# Patient Record
Sex: MALE | Race: WHITE | NOT HISPANIC OR LATINO | Employment: OTHER | URBAN - METROPOLITAN AREA
[De-identification: names, ages, dates, MRNs, and addresses within clinical notes are randomized per-mention and may not be internally consistent; named-entity substitution may affect disease eponyms.]

---

## 2018-05-04 ENCOUNTER — OFFICE VISIT (OUTPATIENT)
Dept: CARDIOLOGY CLINIC | Facility: CLINIC | Age: 68
End: 2018-05-04
Payer: MEDICARE

## 2018-05-04 VITALS
WEIGHT: 208 LBS | DIASTOLIC BLOOD PRESSURE: 86 MMHG | HEIGHT: 71 IN | BODY MASS INDEX: 29.12 KG/M2 | HEART RATE: 95 BPM | SYSTOLIC BLOOD PRESSURE: 128 MMHG | RESPIRATION RATE: 16 BRPM

## 2018-05-04 DIAGNOSIS — Z01.810 PREOP CARDIOVASCULAR EXAM: Primary | ICD-10-CM

## 2018-05-04 DIAGNOSIS — I48.19 PERSISTENT ATRIAL FIBRILLATION (HCC): ICD-10-CM

## 2018-05-04 PROBLEM — I48.91 ATRIAL FIBRILLATION (HCC): Status: ACTIVE | Noted: 2018-05-04

## 2018-05-04 PROCEDURE — 93000 ELECTROCARDIOGRAM COMPLETE: CPT | Performed by: INTERNAL MEDICINE

## 2018-05-04 PROCEDURE — 99204 OFFICE O/P NEW MOD 45 MIN: CPT | Performed by: INTERNAL MEDICINE

## 2018-05-04 RX ORDER — ACETAMINOPHEN 325 MG/1
650 TABLET ORAL EVERY 6 HOURS PRN
COMMUNITY

## 2018-05-04 RX ORDER — DILTIAZEM HYDROCHLORIDE 120 MG/1
120 CAPSULE, COATED, EXTENDED RELEASE ORAL DAILY
Qty: 30 CAPSULE | Refills: 5 | Status: SHIPPED | OUTPATIENT
Start: 2018-05-04 | End: 2018-06-08

## 2018-05-04 RX ORDER — LATANOPROST 50 UG/ML
1 SOLUTION/ DROPS OPHTHALMIC
COMMUNITY
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
## 2018-05-05 LAB
ALBUMIN SERPL BCP-MCNC: 4.1 G/DL (ref 3.5–5.7)
ALP SERPL-CCNC: 51 IU/L (ref 55–165)
ALT SERPL W P-5'-P-CCNC: 38 IU/L (ref 10–35)
ANION GAP SERPL CALCULATED.3IONS-SCNC: 11.7 MM/L
APTT PPP: 35.9 SEC (ref 24.4–37.6)
AST SERPL W P-5'-P-CCNC: 28 U/L (ref 8–27)
BASOPHILS # BLD AUTO: 0 X3/UL (ref 0–0.3)
BASOPHILS # BLD AUTO: 0.8 % (ref 0–2)
BILIRUB SERPL-MCNC: 1.1 MG/DL (ref 0.3–1)
BUN SERPL-MCNC: 13 MG/DL (ref 7–25)
CALCIUM SERPL-MCNC: 8.9 MG/DL (ref 8.6–10.5)
CHLORIDE SERPL-SCNC: 105 MM/L (ref 98–107)
CHOLEST SERPL-MCNC: 147 MG/DL (ref 0–200)
CO2 SERPL-SCNC: 28 MM/L (ref 21–31)
CREAT SERPL-MCNC: 0.99 MG/DL (ref 0.7–1.3)
DEPRECATED RDW RBC AUTO: 12.8 %
EGFR (HISTORICAL): > 60 GFR
EGFR AFRICAN AMERICAN (HISTORICAL): > 60 GFR
EOSINOPHIL # BLD AUTO: 0.2 X3/UL (ref 0–0.5)
EOSINOPHIL NFR BLD AUTO: 3.6 % (ref 0–5)
GLUCOSE (HISTORICAL): 104 MG/DL (ref 65–99)
HCT VFR BLD AUTO: 45.1 % (ref 42–52)
HDLC SERPL-MCNC: 39 MG/DL (ref 40–60)
HGB BLD-MCNC: 15.4 G/DL (ref 14–18)
INR PPP: 1.03 (ref 0.9–1.5)
LDLC SERPL CALC-MCNC: 96.6 MG/DL (ref 75–193)
LYMPHOCYTES # BLD AUTO: 1.4 X3/UL (ref 1.2–4.2)
LYMPHOCYTES NFR BLD AUTO: 25.4 % (ref 20.5–51.1)
MCH RBC QN AUTO: 32 PG (ref 26–34)
MCHC RBC AUTO-ENTMCNC: 34.2 G/DL (ref 31–37)
MCV RBC AUTO: 93.6 FL (ref 81–99)
MONOCYTES # BLD AUTO: 0.5 X3/UL (ref 0–1)
MONOCYTES NFR BLD AUTO: 9.1 % (ref 1.7–12)
NEUTROPHILS # BLD AUTO: 3.3 X3/UL (ref 1.4–6.5)
NEUTS SEG NFR BLD AUTO: 61.1 % (ref 42.2–75.2)
OSMOLALITY, SERUM (HISTORICAL): 280 MOSM (ref 262–291)
OTHER CELLS (HISTORICAL): NORMAL
PLATELET # BLD AUTO: 226 X3/UL (ref 130–400)
PLATELET ESTIMATE (HISTORICAL): NORMAL
PMV BLD AUTO: 9.3 FL
POTASSIUM SERPL-SCNC: 4.7 MM/L (ref 3.5–5.5)
PROTHROMBIN TIME (HISTORICAL): 11.9 SEC (ref 10.1–12.9)
RBC # BLD AUTO: 4.82 X6/UL (ref 4.3–5.9)
RBC MORPHOLOGY (HISTORICAL): NORMAL
SODIUM SERPL-SCNC: 140 MM/L (ref 134–143)
TOTAL PROTEIN (HISTORICAL): 6.5 G/DL (ref 6.4–8.9)
TRIGL SERPL-MCNC: 57 MG/DL (ref 44–166)
VLDL CHOLESTEROL (HISTORICAL): 11 MG/DL (ref 5–51)
WBC # BLD AUTO: 5.4 X3/UL (ref 4.8–10.8)

## 2018-05-07 ENCOUNTER — TELEPHONE (OUTPATIENT)
Dept: CARDIOLOGY CLINIC | Facility: CLINIC | Age: 68
End: 2018-05-07

## 2018-05-07 NOTE — TELEPHONE ENCOUNTER
Per dr Kenn Summers on 5/4/18  He had given eliquis to patient at [facility] 5/4/18 for possible start on 5/7/18  This was to be based on cbc results done on 5/7/18  However, per dr Kenn Summers, he instructed to call patient on Monday 5/7, tell patient he recommends to hold off starting eliquis until he is reevaluated at next ov with zuri in 3 weeks,   Phone call to patient, reviewed dr keating's instructions  Patient understood not to start eliquis 5 mg tablets  He has 5 boxes of eliquis 5 mg tablets in the home  Patient reports he was told cataract surgery for today was cancelled

## 2018-05-23 ENCOUNTER — DOCUMENTATION (OUTPATIENT)
Dept: CARDIOLOGY CLINIC | Facility: CLINIC | Age: 68
End: 2018-05-23

## 2018-05-31 ENCOUNTER — HOSPITAL ENCOUNTER (OUTPATIENT)
Dept: NON INVASIVE DIAGNOSTICS | Facility: CLINIC | Age: 68
Discharge: HOME | End: 2018-05-31
Payer: MEDICARE

## 2018-05-31 DIAGNOSIS — Z01.810 PREOP CARDIOVASCULAR EXAM: ICD-10-CM

## 2018-05-31 PROCEDURE — 93306 TTE W/DOPPLER COMPLETE: CPT

## 2018-05-31 PROCEDURE — 93306 TTE W/DOPPLER COMPLETE: CPT | Performed by: INTERNAL MEDICINE

## 2018-06-08 ENCOUNTER — OFFICE VISIT (OUTPATIENT)
Dept: CARDIOLOGY CLINIC | Facility: CLINIC | Age: 68
End: 2018-06-08
Payer: MEDICARE

## 2018-06-08 VITALS
SYSTOLIC BLOOD PRESSURE: 148 MMHG | DIASTOLIC BLOOD PRESSURE: 82 MMHG | HEART RATE: 89 BPM | WEIGHT: 210 LBS | BODY MASS INDEX: 29.4 KG/M2 | HEIGHT: 71 IN

## 2018-06-08 DIAGNOSIS — I48.0 PAROXYSMAL ATRIAL FIBRILLATION (HCC): ICD-10-CM

## 2018-06-08 DIAGNOSIS — I48.91 ATRIAL FIBRILLATION, UNSPECIFIED TYPE (HCC): Primary | ICD-10-CM

## 2018-06-08 PROCEDURE — 99214 OFFICE O/P EST MOD 30 MIN: CPT | Performed by: INTERNAL MEDICINE

## 2018-06-08 PROCEDURE — 93000 ELECTROCARDIOGRAM COMPLETE: CPT | Performed by: INTERNAL MEDICINE

## 2018-06-08 RX ORDER — METOPROLOL SUCCINATE 25 MG/1
25 TABLET, EXTENDED RELEASE ORAL DAILY
Qty: 60 TABLET | Refills: 5 | Status: SHIPPED | OUTPATIENT
Start: 2018-06-08
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
## 2018-06-12 RX ORDER — SODIUM CHLORIDE 9 MG/ML
100 INJECTION, SOLUTION INTRAVENOUS ONCE
Status: CANCELLED | OUTPATIENT
Start: 2018-06-15

## 2018-06-13 ENCOUNTER — HOSPITAL ENCOUNTER (OUTPATIENT)
Dept: NON INVASIVE DIAGNOSTICS | Facility: CLINIC | Age: 68
Discharge: HOME | End: 2018-06-13
Payer: MEDICARE

## 2018-06-13 DIAGNOSIS — I48.91 ATRIAL FIBRILLATION, UNSPECIFIED TYPE (HCC): ICD-10-CM

## 2018-06-13 LAB
MAX DIASTOLIC BP: 90 MMHG
MAX HEART RATE: 134 BPM
MAX PREDICTED HEART RATE: 151 BPM
MAX. SYSTOLIC BP: 200 MMHG
PROTOCOL NAME: NORMAL
REASON FOR TERMINATION: NORMAL
TARGET HR FORMULA: NORMAL
TEST INDICATION: NORMAL
TIME IN EXERCISE PHASE: NORMAL

## 2018-06-13 PROCEDURE — 93017 CV STRESS TEST TRACING ONLY: CPT

## 2018-06-13 PROCEDURE — A9502 TC99M TETROFOSMIN: HCPCS

## 2018-06-13 PROCEDURE — 78452 HT MUSCLE IMAGE SPECT MULT: CPT

## 2018-06-13 RX ADMIN — REGADENOSON 0.4 MG: 0.08 INJECTION, SOLUTION INTRAVENOUS at 10:15

## 2018-06-15 ENCOUNTER — ANESTHESIA (OUTPATIENT)
Dept: NON INVASIVE DIAGNOSTICS | Facility: HOSPITAL | Age: 68
End: 2018-06-15

## 2018-06-15 ENCOUNTER — ANESTHESIA EVENT (OUTPATIENT)
Dept: NON INVASIVE DIAGNOSTICS | Facility: HOSPITAL | Age: 68
End: 2018-06-15

## 2018-06-15 ENCOUNTER — HOSPITAL ENCOUNTER (OUTPATIENT)
Dept: NON INVASIVE DIAGNOSTICS | Facility: HOSPITAL | Age: 68
Discharge: HOME | End: 2018-06-15
Attending: INTERNAL MEDICINE
Payer: MEDICARE

## 2018-06-15 VITALS
DIASTOLIC BLOOD PRESSURE: 86 MMHG | OXYGEN SATURATION: 97 % | RESPIRATION RATE: 18 BRPM | SYSTOLIC BLOOD PRESSURE: 166 MMHG | HEART RATE: 67 BPM

## 2018-06-15 VITALS
DIASTOLIC BLOOD PRESSURE: 77 MMHG | RESPIRATION RATE: 18 BRPM | TEMPERATURE: 97.8 F | HEIGHT: 71 IN | HEART RATE: 68 BPM | SYSTOLIC BLOOD PRESSURE: 145 MMHG | BODY MASS INDEX: 29.12 KG/M2 | OXYGEN SATURATION: 99 % | WEIGHT: 208 LBS

## 2018-06-15 DIAGNOSIS — I48.0 PAROXYSMAL ATRIAL FIBRILLATION (HCC): ICD-10-CM

## 2018-06-15 DIAGNOSIS — I48.91 ATRIAL FIBRILLATION, UNSPECIFIED TYPE (HCC): ICD-10-CM

## 2018-06-15 DIAGNOSIS — I48.0 PAF (PAROXYSMAL ATRIAL FIBRILLATION) (HCC): Primary | ICD-10-CM

## 2018-06-15 DIAGNOSIS — I48.0 PAROXYSMAL ATRIAL FIBRILLATION (HCC): Primary | ICD-10-CM

## 2018-06-15 LAB
ANION GAP SERPL CALCULATED.3IONS-SCNC: 6 MMOL/L (ref 4–13)
ATRIAL RATE: 67 BPM
BUN SERPL-MCNC: 13 MG/DL (ref 5–25)
CALCIUM SERPL-MCNC: 8.8 MG/DL (ref 8.3–10.1)
CHLORIDE SERPL-SCNC: 105 MMOL/L (ref 100–108)
CO2 SERPL-SCNC: 30 MMOL/L (ref 21–32)
CREAT SERPL-MCNC: 0.91 MG/DL (ref 0.6–1.3)
ERYTHROCYTE [DISTWIDTH] IN BLOOD BY AUTOMATED COUNT: 12.7 % (ref 11.6–15.1)
GFR SERPL CREATININE-BSD FRML MDRD: 86 ML/MIN/1.73SQ M
GLUCOSE P FAST SERPL-MCNC: 95 MG/DL (ref 65–99)
GLUCOSE SERPL-MCNC: 95 MG/DL (ref 65–140)
HCT VFR BLD AUTO: 43.2 % (ref 36.5–49.3)
HGB BLD-MCNC: 14.6 G/DL (ref 12–17)
MCH RBC QN AUTO: 31.5 PG (ref 26.8–34.3)
MCHC RBC AUTO-ENTMCNC: 33.8 G/DL (ref 31.4–37.4)
MCV RBC AUTO: 93 FL (ref 82–98)
P AXIS: 27 DEGREES
PLATELET # BLD AUTO: 214 THOUSANDS/UL (ref 149–390)
PMV BLD AUTO: 10 FL (ref 8.9–12.7)
POTASSIUM SERPL-SCNC: 4.3 MMOL/L (ref 3.5–5.3)
PR INTERVAL: 202 MS
QRS AXIS: 62 DEGREES
QRS AXIS: 68 DEGREES
QRSD INTERVAL: 80 MS
QRSD INTERVAL: 88 MS
QT INTERVAL: 364 MS
QT INTERVAL: 448 MS
QTC INTERVAL: 430 MS
QTC INTERVAL: 473 MS
RBC # BLD AUTO: 4.63 MILLION/UL (ref 3.88–5.62)
SODIUM SERPL-SCNC: 141 MMOL/L (ref 136–145)
T WAVE AXIS: 42 DEGREES
T WAVE AXIS: 53 DEGREES
VENTRICULAR RATE: 67 BPM
VENTRICULAR RATE: 84 BPM
WBC # BLD AUTO: 5.33 THOUSAND/UL (ref 4.31–10.16)

## 2018-06-15 PROCEDURE — 93312 ECHO TRANSESOPHAGEAL: CPT

## 2018-06-15 PROCEDURE — 80048 BASIC METABOLIC PNL TOTAL CA: CPT | Performed by: PHYSICIAN ASSISTANT

## 2018-06-15 PROCEDURE — 92960 CARDIOVERSION ELECTRIC EXT: CPT

## 2018-06-15 PROCEDURE — 93005 ELECTROCARDIOGRAM TRACING: CPT

## 2018-06-15 PROCEDURE — 93010 ELECTROCARDIOGRAM REPORT: CPT | Performed by: INTERNAL MEDICINE

## 2018-06-15 PROCEDURE — 92960 CARDIOVERSION ELECTRIC EXT: CPT | Performed by: INTERNAL MEDICINE

## 2018-06-15 PROCEDURE — 85027 COMPLETE CBC AUTOMATED: CPT | Performed by: PHYSICIAN ASSISTANT

## 2018-06-15 RX ORDER — SODIUM CHLORIDE 9 MG/ML
100 INJECTION, SOLUTION INTRAVENOUS ONCE
Status: COMPLETED | OUTPATIENT
Start: 2018-06-15 | End: 2018-06-15

## 2018-06-15 RX ORDER — FLECAINIDE ACETATE 50 MG/1
50 TABLET ORAL 2 TIMES DAILY
Refills: 0
Start: 2018-06-15

## 2018-06-15 RX ORDER — LABETALOL HYDROCHLORIDE 5 MG/ML
INJECTION, SOLUTION INTRAVENOUS AS NEEDED
Status: DISCONTINUED | OUTPATIENT
Start: 2018-06-15 | End: 2018-06-15 | Stop reason: SURG

## 2018-06-15 RX ORDER — SODIUM CHLORIDE 9 MG/ML
INJECTION, SOLUTION INTRAVENOUS CONTINUOUS PRN
Status: DISCONTINUED | OUTPATIENT
Start: 2018-06-15 | End: 2018-06-15 | Stop reason: SURG

## 2018-06-15 RX ORDER — FENTANYL CITRATE 50 UG/ML
25 INJECTION, SOLUTION INTRAMUSCULAR; INTRAVENOUS
Status: DISCONTINUED | OUTPATIENT
Start: 2018-06-15 | End: 2018-06-15 | Stop reason: HOSPADM

## 2018-06-15 RX ORDER — FLECAINIDE ACETATE 50 MG/1
50 TABLET ORAL 2 TIMES DAILY
Qty: 60 TABLET | Refills: 5 | Status: SHIPPED | OUTPATIENT
Start: 2018-06-15

## 2018-06-15 RX ORDER — PROPOFOL 10 MG/ML
INJECTION, EMULSION INTRAVENOUS AS NEEDED
Status: DISCONTINUED | OUTPATIENT
Start: 2018-06-15 | End: 2018-06-15 | Stop reason: SURG

## 2018-06-15 RX ORDER — ONDANSETRON 2 MG/ML
4 INJECTION INTRAMUSCULAR; INTRAVENOUS EVERY 6 HOURS PRN
Status: DISCONTINUED | OUTPATIENT
Start: 2018-06-15 | End: 2018-06-15 | Stop reason: HOSPADM

## 2018-06-15 RX ORDER — EPHEDRINE SULFATE 50 MG/ML
INJECTION, SOLUTION INTRAVENOUS AS NEEDED
Status: DISCONTINUED | OUTPATIENT
Start: 2018-06-15 | End: 2018-06-15 | Stop reason: SURG

## 2018-06-15 RX ADMIN — PROPOFOL 50 MG: 10 INJECTION, EMULSION INTRAVENOUS at 15:15

## 2018-06-15 RX ADMIN — LABETALOL HYDROCHLORIDE 10 MG: 5 INJECTION, SOLUTION INTRAVENOUS at 14:50

## 2018-06-15 RX ADMIN — PROPOFOL 50 MG: 10 INJECTION, EMULSION INTRAVENOUS at 14:58

## 2018-06-15 RX ADMIN — SODIUM CHLORIDE: 0.9 INJECTION, SOLUTION INTRAVENOUS at 14:30

## 2018-06-15 RX ADMIN — PROPOFOL 50 MG: 10 INJECTION, EMULSION INTRAVENOUS at 15:17

## 2018-06-15 RX ADMIN — PROPOFOL 50 MG: 10 INJECTION, EMULSION INTRAVENOUS at 15:01

## 2018-06-15 RX ADMIN — SODIUM CHLORIDE: 0.9 INJECTION, SOLUTION INTRAVENOUS at 15:29

## 2018-06-15 RX ADMIN — PROPOFOL 50 MG: 10 INJECTION, EMULSION INTRAVENOUS at 14:50

## 2018-06-15 RX ADMIN — PROPOFOL 50 MG: 10 INJECTION, EMULSION INTRAVENOUS at 14:54

## 2018-06-15 RX ADMIN — PROPOFOL 40 MG: 10 INJECTION, EMULSION INTRAVENOUS at 15:19

## 2018-06-15 RX ADMIN — LIDOCAINE HYDROCHLORIDE 100 MG: 20 INJECTION, SOLUTION INTRAVENOUS at 14:44

## 2018-06-15 RX ADMIN — PROPOFOL 30 MG: 10 INJECTION, EMULSION INTRAVENOUS at 15:27

## 2018-06-15 RX ADMIN — PROPOFOL 50 MG: 10 INJECTION, EMULSION INTRAVENOUS at 14:49

## 2018-06-15 RX ADMIN — PROPOFOL 40 MG: 10 INJECTION, EMULSION INTRAVENOUS at 15:23

## 2018-06-15 RX ADMIN — PROPOFOL 40 MG: 10 INJECTION, EMULSION INTRAVENOUS at 15:11

## 2018-06-15 RX ADMIN — EPHEDRINE SULFATE 10 MG: 50 INJECTION, SOLUTION INTRAMUSCULAR; INTRAVENOUS; SUBCUTANEOUS at 15:42

## 2018-06-15 RX ADMIN — PROPOFOL 30 MG: 10 INJECTION, EMULSION INTRAVENOUS at 15:33

## 2018-06-15 RX ADMIN — SODIUM CHLORIDE 100 ML/HR: 0.9 INJECTION, SOLUTION INTRAVENOUS at 14:14

## 2018-06-15 RX ADMIN — PROPOFOL 50 MG: 10 INJECTION, EMULSION INTRAVENOUS at 14:51

## 2018-06-15 RX ADMIN — PROPOFOL 40 MG: 10 INJECTION, EMULSION INTRAVENOUS at 15:07

## 2018-06-15 NOTE — ANESTHESIA POSTPROCEDURE EVALUATION
Post-Op Assessment Note      CV Status:  Stable    Mental Status:  Alert and awake    Hydration Status:  Euvolemic    PONV Controlled:  Controlled    Airway Patency:  Patent    Post Op Vitals Reviewed: Yes          Staff: Anesthesiologist           /67 (06/15/18 1620)    Temp      Pulse 66 (06/15/18 1620)   Resp 16 (06/15/18 1620)    SpO2 98 % (06/15/18 1620)

## 2018-06-15 NOTE — ANESTHESIA PREPROCEDURE EVALUATION
Review of Systems/Medical History  Patient summary reviewed  Chart reviewed  No history of anesthetic complications     Cardiovascular  EKG reviewed, Dysrhythmias , atrial fibrillation,   Comment: PERFUSION DEFECTS:  -  There were no perfusion defects      GATED SPECT:  The calculated left ventricular ejection fraction was 65 %  Left ventricular ejection fraction was within normal limits by visual estimate  There was no left ventricular regional abnormality      SUMMARY:  -  Stress results: There was resting hypertension with an appropriate blood pressure response to stress  There was no chest pain during stress  -  ECG conclusions: The stress ECG was negative for ischemia and normal   -  Perfusion imaging: There were no perfusion defects   -  Gated SPECT: The calculated left ventricular ejection fraction was 65 %  Left ventricular ejection fraction was within normal limits by visual estimate  There was no left ventricular regional abnormality      IMPRESSIONS: Normal study after pharmacologic vasodilation without reproduction of symptoms  Myocardial perfusion imaging was normal at rest and with stress   Left ventricular systolic function was normal      Prepared and signed by     Shelvy Meckel, MD  Signed 06/13/2018 11:41:49,  Pulmonary  Negative pulmonary ROS        GI/Hepatic  Negative GI/hepatic ROS          Negative  ROS        Endo/Other  Negative endo/other ROS      GYN  Negative gynecology ROS          Hematology    Coagulation disorder currently taking oral anticoagulants,    Musculoskeletal  Negative musculoskeletal ROS        Neurology  Negative neurology ROS      Psychology   Negative psychology ROS              Physical Exam    Airway    Mallampati score: II  TM Distance: >3 FB  Neck ROM: full     Dental   No notable dental hx     Cardiovascular  Rhythm: regular, Rate: normal, Cardiovascular exam normal    Pulmonary  Pulmonary exam normal Breath sounds clear to auscultation,     Other Findings        Anesthesia Plan  ASA Score- 3     Anesthesia Type- IV sedation with anesthesia with ASA Monitors  Additional Monitors:   Airway Plan:         Plan Factors- Patient instructed to abstain from smoking on day of procedure  Patient did not smoke on day of surgery  Induction- intravenous  Postoperative Plan-     Informed Consent- Anesthetic plan and risks discussed with patient

## 2018-06-15 NOTE — DISCHARGE INSTRUCTIONS
· Please remember to take all your medications as prescribed  · A prescription for a new medication called Flecainide has been sent to your pharmacy by Dr Richard Menjivar  Please pick it up and begin taking it  · You will be contacted by the staff from Dr Jessica Veliz office to arrange an appointment for an ECG   Flecainide (By mouth)   Flecainide (FLEK-a-nide)  Used to prevent or treat irregular heartbeats (arrhythmias) that are serious  Belongs to a class of drugs called antiarrhythmics  Brand Name(s): Tambocor   There may be other brand names for this medicine  When This Medicine Should Not Be Used: You should not use this medicine if you have had an allergic reaction to flecainide or if you have certain heart problems  Your doctor will determine if this medicine is right for you  How to Use This Medicine:   Tablet  · You will receive your first dose of this medicine in a hospital  Your doctor will watch you closely after you take this medicine to make sure you do not have any serious side effects  · Take your medicine as directed  Your dose may need to be changed several times to find what works best for you  · This medicine may be taken with or without food  · You may need to carry identification stating that you are taking flecainide  Ask your doctor about this  · In infants (less than 3years of age) who drink a lot of milk, your doctor may adjust the dose of this medicine when it is time to reduce the amount of milk your infant is receiving, or if they develop gastroenteritis  Talk with your doctor if you have questions about this  If a dose is missed:   · If you take your medicine two times a day and you miss a dose, take it as soon as possible unless you are more than 6 hours late  · If you are more than 6 hours late, skip the missed dose, and take your medicine at your usual time  · You should not use two doses at the same time    How to Store and Dispose of This Medicine:   · Store the medicine in a closed container at room temperature, away from heat, moisture, and direct light  · Ask your pharmacist, doctor, or health caregiver about the best way to dispose of any outdated medicine or medicine no longer needed  · Keep all medicine out of the reach of children  Never share your medicine with anyone  Drugs and Foods to Avoid:   Ask your doctor or pharmacist before using any other medicine, including over-the-counter medicines, vitamins, and herbal products  · Make sure your doctor knows if you are using other medicine for heart rhythm problems (such as amiodarone, disopyramide, quinidine, Cardioquin®, Cordarone®, Norpace®, or Rebecca Yanna), certain blood pressure medicine (such as atenolol, diltiazem, metoprolol, nifedipine, propranolol, timolol, verapamil, Adalat®, Calan®, Cardizem®, Inderal®, Isoptin®, Procardia®, Toprol®, or Verelan®), carbamazepine (Tegretol®), cimetidine (Tagamet®), digoxin (Lanoxin®), phenobarbital (Luminal®), phenytoin (Dilantin®), or a diuretic or "water pill" (such as hydrochlorothiazide [HCTZ], furosemide, torsemide, Demadex®, or Lasix®)  Warnings While Using This Medicine:   · Make sure your doctor knows if you are pregnant or breastfeeding, or if you have kidney disease, liver disease, heart disease, heart rhythm problems (such as prolonged VT, QRS, or QT intervals or sick sinus syndrome), a recent heart attack, congestive heart failure, or if you have a pacemaker  · Check with your doctor right away if you develop any of the following: chest pain; shortness of breath; swelling of your hands, ankles, or feet; or weight gain  These may be symptoms of heart failure  · This medicine can cause changes in your heart rhythm, such as conditions called VT, QRS, or QT prolongation  It may cause fainting or serious side effects in some patients  Contact your doctor right away if your symptoms do not improve or if they get worse    · Tell any doctor or dentist who treats you that you are using this medicine  You may need to stop using this medicine several days before you have surgery or medical tests  · Do not stop using this medicine suddenly  Your doctor will need to slowly decrease your dose before you stop it completely  · This medicine may make you dizzy or drowsy  Avoid driving, using machines, or doing anything else that could be dangerous if you are not alert  · Your doctor will check your progress and the effects of this medicine at regular visits  Keep all appointments  Possible Side Effects While Using This Medicine:   Call your doctor right away if you notice any of these side effects:  · Allergic reaction: Itching or hives, swelling in your face or hands, swelling or tingling in your mouth or throat, chest tightness, trouble breathing  · Chest pain  · Fast, pounding, or uneven heartbeat  · Fever or chills  · Lightheadedness, dizziness, or fainting  · Shortness of breath or troubled breathing  · Swelling in your hands, ankles, or feet  · Tremors  · Yellowing of your skin or the whites of your eyes  If you notice these less serious side effects, talk with your doctor:   · Headache  · Diarrhea, constipation, nausea, vomiting, loss of appetite, or stomach pain or cramps  · Tiredness or weakness  · Vision changes such as trouble focusing or seeing spots  If you notice other side effects that you think are caused by this medicine, tell your doctor  Call your doctor for medical advice about side effects  You may report side effects to FDA at 3-949-FDA-6350  © 2017 [address] Information is for End User's use only and may not be sold, redistributed or otherwise used for commercial purposes  The above information is an  only  It is not intended as medical advice for individual conditions or treatments   Talk to your doctor, nurse or pharmacist before following any medical regimen to see if it is safe and effective for you               Transesophageal Echocardiogram   WHAT YOU NEED TO KNOW:   A transesophageal echocardiogram (ISIDRO) is a procedure used to check for problems with your heart  It will also show any problems in the blood vessels near your heart  Sound waves are sent to the heart through a tube inserted into your throat  The sound waves show the structure and function of your heart through pictures on a monitor  DISCHARGE INSTRUCTIONS:   Rest:  Rest until you are fully awake  You may be sleepy for a while after your procedure  Do not eat or drink until you are able to swallow normally:  Start with soft foods, such as oatmeal, yogurt, or applesauce  Once you can eat soft foods easily, you may begin to eat solid foods  Follow up with your healthcare provider as directed:  Write down your questions so you remember to ask them during your visits  Contact your healthcare provider if:   · You have a fever or chills  · You taste blood in your mouth  · You have a severe sore throat or difficulty swallowing  · You have questions or concerns about your condition or care  Seek care immediately or call 911 if:   · You have any of the following signs of a heart attack:      ¨ Squeezing, pressure, or pain in your chest that lasts longer than 5 minutes or returns    ¨ Discomfort or pain in your back, neck, jaw, stomach, or arm     ¨ Trouble breathing    ¨ Nausea or vomiting    ¨ Lightheadedness or a sudden cold sweat, especially with chest pain or trouble breathing    © 2017 [facility] [employer] [address] is for End User's use only and may not be sold, redistributed or otherwise used for commercial purposes  All illustrations and images included in CareNotes® are the copyrighted property of [employer]  or [employer]  The above information is an  only  It is not intended as medical advice for individual conditions or treatments   Talk to your doctor, nurse or pharmacist before following any medical regimen to see if it is safe and effective for you  Cardioversion   WHAT YOU NEED TO KNOW:   Cardioversion is a procedure that uses medicine or electrical shocks to correct arrhythmias  An arrhythmias is a heartbeat that is too slow, too fast, or irregular  It may prevent your body from getting the blood and oxygen it needs  Your heart has 4 chambers, called the atria and ventricles  The atria are at the top of your heart, and the ventricles are at the bottom of your heart  Most arrhythmias that need cardioversion start in the atria  DISCHARGE INSTRUCTIONS:   Call 911 for any of the following:   · You have any of the following signs of a heart attack:      ¨ Squeezing, pressure, or pain in your chest that lasts longer than 5 minutes or returns    ¨ Discomfort or pain in your back, neck, jaw, stomach, or arm     ¨ Trouble breathing    ¨ Nausea or vomiting    ¨ Lightheadedness or a sudden cold sweat, especially with chest pain or trouble breathing    · You have any of the following signs of a stroke:      ¨ Numbness or drooping on one side of your face     ¨ Weakness in an arm or leg    ¨ Confusion or difficulty speaking    ¨ Dizziness, a severe headache, or vision loss    · You feel lightheaded, short of breath, and have chest pain  · You cough up blood  · You have trouble breathing  Seek care immediately if:   · You feel your heart beating fast or fluttering  · You feel weak or faint  · Your leg or arm is larger than usual, painful, and warm  Contact your healthcare provider if:   · Your skin is itchy, swollen, or you have a rash  · You have questions or concerns about your condition or care  Medicines: You may need any of the following:  · Heart medicines  help control your heart rate and rhythm  · Blood thinners    help prevent blood clots  Examples of blood thinners include heparin and warfarin  Clots can cause strokes, heart attacks, and death   The following are general safety guidelines to follow while you are taking a blood thinner:    ¨ Watch for bleeding and bruising while you take blood thinners  Watch for bleeding from your gums or nose  Watch for blood in your urine and bowel movements  Use a soft washcloth on your skin, and a soft toothbrush to brush your teeth  This can keep your skin and gums from bleeding  If you shave, use an electric shaver  Do not play contact sports  ¨ Tell your dentist and other healthcare providers that you take anticoagulants  Wear a bracelet or necklace that says you take this medicine  ¨ Do not start or stop any medicines unless your healthcare provider tells you to  Many medicines cannot be used with blood thinners  ¨ Tell your healthcare provider right away if you forget to take the medicine, or if you take too much  ¨ Warfarin  is a blood thinner that you may need to take  The following are things you should be aware of if you take warfarin  § Foods and medicines can affect the amount of warfarin in your blood  Do not make major changes to your diet while you take warfarin  Warfarin works best when you eat about the same amount of vitamin K every day  Vitamin K is found in green leafy vegetables and certain other foods  Ask for more information about what to eat when you are taking warfarin  § You will need to see your healthcare provider for follow-up visits when you are on warfarin  You will need regular blood tests  These tests are used to decide how much medicine you need  · Take your medicine as directed  Contact your healthcare provider if you think your medicine is not helping or if you have side effects  Tell him or her if you are allergic to any medicine  Keep a list of the medicines, vitamins, and herbs you take  Include the amounts, and when and why you take them  Bring the list or the pill bottles to follow-up visits   Carry your medicine list with you in case of an emergency  Self-care:   · Rest as directed  Do not drive for at least 24 hours  Ask your healthcare provider when you can return to your normal activities  · Check your heart rate and blood pressure as directed  Ask your healthcare provider what your heart rate and blood pressure should be  · Do not smoke  Nicotine and other chemicals in cigarettes and cigars can cause heart and lung damage  They can also increase your risk for another arrhythmia  Ask your healthcare provider for information if you currently smoke and need help to quit  E-cigarettes or smokeless tobacco still contain nicotine  Talk to your healthcare provider before you use these products  · Eat heart healthy foods  These include fruits, vegetables, whole-grain breads, low-fat dairy products, beans, lean meats, and fish  Replace butter and margarine with heart-healthy oils such as olive oil and canola oil  · Maintain a healthy weight  Ask your healthcare provider how much you should weigh  Ask him to help you create a weight loss plan if you are overweight  Follow up with your healthcare provider as directed:  Write down your questions so you remember to ask them during your visits  © 2017 [address] Information is for End User's use only and may not be sold, redistributed or otherwise used for commercial purposes  All illustrations and images included in CareNotes® are the copyrighted property of A [employer] M , Inc  or [employer]  The above information is an  only  It is not intended as medical advice for individual conditions or treatments  Talk to your doctor, nurse or pharmacist before following any medical regimen to see if it is safe and effective for you

## 2018-06-17 ENCOUNTER — TREATMENT (OUTPATIENT)
Dept: CARDIOLOGY CLINIC | Facility: CLINIC | Age: 68
End: 2018-06-17

## 2018-06-17 NOTE — PROGRESS NOTES
Procedure note:  ISIDRO/Cardioversion Preliminary Report -   Procedure date 6/15    Impression:      LV:  Normal size and systolic function  Left ventricular ejection fraction ~ 60%  LA:  Dilated  TANA:  Normal size and function  No spontaneous echocontrast ("smoke"), or thrombus  Informed consent obtained from patient prior to procedure after all indications, risks, and benefits of ISIDRO thoroughly discussed  Propofol was utilized for sedation and administered intravenously by Anesthesia  Blood pressure, EKG, pulse oximetry, respirations, and level of consciousness were monitored throughout the procedure  Pt tolerated procedure well with nurse anesthetist performing sedation and monitoring  ISIDRO probe passed without difficulty with minimal blood seen on probe upon extubation  After ensuring adequate sedation, the patient was cardioverted with one 150 J DC shock and converted to sinus rhythm without complications

## 2018-06-25 ENCOUNTER — CLINICAL SUPPORT (OUTPATIENT)
Dept: CARDIOLOGY CLINIC | Facility: CLINIC | Age: 68
End: 2018-06-25
Payer: MEDICARE

## 2018-06-25 DIAGNOSIS — I48.0 PAF (PAROXYSMAL ATRIAL FIBRILLATION) (HCC): Primary | ICD-10-CM

## 2018-06-25 DIAGNOSIS — I48.91 ATRIAL FIBRILLATION STATUS POST CARDIOVERSION (HCC): Primary | ICD-10-CM

## 2018-06-25 PROCEDURE — 93000 ELECTROCARDIOGRAM COMPLETE: CPT | Performed by: INTERNAL MEDICINE

## 2018-06-25 NOTE — PROGRESS NOTES
Pt of Dr Delbert Schwartz came into office for EKG  Pt states he feels well, taking all meds correctly  EKG signed by Dr Mitch Lisa

## 2018-08-30 ENCOUNTER — TELEPHONE (OUTPATIENT)
Dept: CARDIOLOGY CLINIC | Facility: CLINIC | Age: 68
End: 2018-08-30

## 2018-08-30 NOTE — TELEPHONE ENCOUNTER
Phone call from patient  Confused if he was to be taking Diltiazem  Per notes found diltiazem was discontinued at 6/8/18 office appt  Patient does not have any follow up appt sched for Dr Candy Taylor  He does have recall for Dr Dolores Kwon  Should his recall for Dec be with Dr Candy Taylor or Dr Dolores Kwon?     Please advise

## 2018-08-31 NOTE — TELEPHONE ENCOUNTER
Spoke with Dr Rukhsana uHnter  He does want Ramon to sched follow up with Dr Taqueria Call in 2-3months  Left message on patient voice mail  We do have him on recall list to follow up in December with Dr Taqueria Call

## 2018-12-11 ENCOUNTER — TRANSCRIBE ORDERS (OUTPATIENT)
Dept: ADMINISTRATIVE | Facility: HOSPITAL | Age: 68
End: 2018-12-11

## 2018-12-11 DIAGNOSIS — Z12.11 ENCOUNTER FOR SCREENING FOR MALIGNANT NEOPLASM OF COLON: Primary | ICD-10-CM

## 2018-12-12 ENCOUNTER — TRANSCRIBE ORDERS (OUTPATIENT)
Dept: ADMINISTRATIVE | Facility: HOSPITAL | Age: 68
End: 2018-12-12

## 2018-12-12 ENCOUNTER — APPOINTMENT (OUTPATIENT)
Dept: LAB | Facility: HOSPITAL | Age: 68
End: 2018-12-12
Attending: INTERNAL MEDICINE
Payer: MEDICARE

## 2018-12-12 DIAGNOSIS — Z12.11 SCREENING FOR MALIGNANT NEOPLASM OF COLON: ICD-10-CM

## 2018-12-12 DIAGNOSIS — Z12.11 SCREENING FOR MALIGNANT NEOPLASM OF COLON: Primary | ICD-10-CM

## 2018-12-12 DIAGNOSIS — R79.89 ABNORMAL LFTS (LIVER FUNCTION TESTS): ICD-10-CM

## 2018-12-12 LAB
AFP-TM SERPL-MCNC: 3.6 NG/ML (ref 0.5–8)
FERRITIN SERPL-MCNC: 721 NG/ML (ref 8–388)
IRON SATN MFR SERPL: 66 %
IRON SERPL-MCNC: 177 UG/DL (ref 65–175)
TIBC SERPL-MCNC: 268 UG/DL (ref 250–450)

## 2018-12-12 PROCEDURE — 36415 COLL VENOUS BLD VENIPUNCTURE: CPT

## 2018-12-12 PROCEDURE — 86235 NUCLEAR ANTIGEN ANTIBODY: CPT

## 2018-12-12 PROCEDURE — 83550 IRON BINDING TEST: CPT

## 2018-12-12 PROCEDURE — 82390 ASSAY OF CERULOPLASMIN: CPT

## 2018-12-12 PROCEDURE — 82105 ALPHA-FETOPROTEIN SERUM: CPT

## 2018-12-12 PROCEDURE — 80074 ACUTE HEPATITIS PANEL: CPT

## 2018-12-12 PROCEDURE — 83540 ASSAY OF IRON: CPT

## 2018-12-12 PROCEDURE — 86038 ANTINUCLEAR ANTIBODIES: CPT

## 2018-12-12 PROCEDURE — 82728 ASSAY OF FERRITIN: CPT

## 2018-12-13 LAB
ACTIN IGG SERPL-ACNC: 11 UNITS (ref 0–19)
CERULOPLASMIN SERPL-MCNC: 23.5 MG/DL (ref 16–31)
HAV IGM SER QL: ABNORMAL
HBV CORE IGM SER QL: ABNORMAL
HBV SURFACE AG SER QL: ABNORMAL
HCV AB SER QL: ABNORMAL

## 2018-12-14 ENCOUNTER — TRANSCRIBE ORDERS (OUTPATIENT)
Dept: ADMINISTRATIVE | Facility: HOSPITAL | Age: 68
End: 2018-12-14

## 2018-12-14 ENCOUNTER — APPOINTMENT (OUTPATIENT)
Dept: LAB | Facility: HOSPITAL | Age: 68
End: 2018-12-14
Attending: INTERNAL MEDICINE
Payer: MEDICARE

## 2018-12-14 DIAGNOSIS — E83.19 INCREASED STORAGE IRON: ICD-10-CM

## 2018-12-14 DIAGNOSIS — E83.19 INCREASED STORAGE IRON: Primary | ICD-10-CM

## 2018-12-14 LAB — RYE IGE QN: NEGATIVE

## 2018-12-14 PROCEDURE — 81256 HFE GENE: CPT

## 2018-12-14 PROCEDURE — 36415 COLL VENOUS BLD VENIPUNCTURE: CPT

## 2018-12-19 LAB — HFE GENE MUT ANL BLD/T: NORMAL

## 2018-12-20 ENCOUNTER — TRANSCRIBE ORDERS (OUTPATIENT)
Dept: ADMINISTRATIVE | Facility: HOSPITAL | Age: 68
End: 2018-12-20

## 2018-12-20 ENCOUNTER — HOSPITAL ENCOUNTER (OUTPATIENT)
Dept: ULTRASOUND IMAGING | Facility: HOSPITAL | Age: 68
Discharge: HOME | End: 2018-12-20
Attending: INTERNAL MEDICINE
Payer: MEDICARE

## 2018-12-20 ENCOUNTER — APPOINTMENT (OUTPATIENT)
Dept: LAB | Facility: HOSPITAL | Age: 68
End: 2018-12-20
Attending: PHYSICIAN ASSISTANT
Payer: MEDICARE

## 2018-12-20 DIAGNOSIS — B19.20 HEPATITIS C VIRUS INFECTION WITHOUT HEPATIC COMA, UNSPECIFIED CHRONICITY: Primary | ICD-10-CM

## 2018-12-20 DIAGNOSIS — B19.20 HEPATITIS C VIRUS INFECTION WITHOUT HEPATIC COMA, UNSPECIFIED CHRONICITY: ICD-10-CM

## 2018-12-20 DIAGNOSIS — Z12.11 ENCOUNTER FOR SCREENING FOR MALIGNANT NEOPLASM OF COLON: ICD-10-CM

## 2018-12-20 PROCEDURE — 76700 US EXAM ABDOM COMPLETE: CPT

## 2018-12-20 PROCEDURE — 87522 HEPATITIS C REVRS TRNSCRPJ: CPT

## 2018-12-20 PROCEDURE — 87902 NFCT AGT GNTYP ALYS HEP C: CPT

## 2018-12-20 PROCEDURE — 36415 COLL VENOUS BLD VENIPUNCTURE: CPT

## 2018-12-26 LAB
HCV GENTYP SERPL NAA+PROBE: NORMAL
HCV PLEASE NOTE: NORMAL

## 2018-12-31 LAB
HCV RNA SERPL NAA+PROBE-ACNC: NORMAL IU/ML
HCV RNA SERPL NAA+PROBE-LOG IU: 6.22 LOG10 IU/ML
TEST INFORMATION: NORMAL

## 2019-03-11 ENCOUNTER — APPOINTMENT (OUTPATIENT)
Dept: LAB | Facility: HOSPITAL | Age: 69
End: 2019-03-11
Attending: INTERNAL MEDICINE
Payer: MEDICARE

## 2019-03-11 ENCOUNTER — TRANSCRIBE ORDERS (OUTPATIENT)
Dept: ADMINISTRATIVE | Facility: HOSPITAL | Age: 69
End: 2019-03-11

## 2019-03-11 DIAGNOSIS — I10 HYPERTENSION, UNSPECIFIED TYPE: ICD-10-CM

## 2019-03-11 DIAGNOSIS — I10 HYPERTENSION, UNSPECIFIED TYPE: Primary | ICD-10-CM

## 2019-03-11 LAB
ANION GAP SERPL CALCULATED.3IONS-SCNC: 7 MMOL/L (ref 4–13)
BUN SERPL-MCNC: 19 MG/DL (ref 7–25)
CALCIUM SERPL-MCNC: 9.7 MG/DL (ref 8.6–10.5)
CHLORIDE SERPL-SCNC: 104 MMOL/L (ref 98–107)
CO2 SERPL-SCNC: 29 MMOL/L (ref 21–31)
CREAT SERPL-MCNC: 1.1 MG/DL (ref 0.7–1.3)
GFR SERPL CREATININE-BSD FRML MDRD: 68 ML/MIN/1.73SQ M
GLUCOSE P FAST SERPL-MCNC: 104 MG/DL (ref 65–99)
POTASSIUM SERPL-SCNC: 4.5 MMOL/L (ref 3.5–5.5)
SODIUM SERPL-SCNC: 140 MMOL/L (ref 134–143)

## 2019-03-11 PROCEDURE — 36415 COLL VENOUS BLD VENIPUNCTURE: CPT

## 2019-03-11 PROCEDURE — 80048 BASIC METABOLIC PNL TOTAL CA: CPT

## 2019-08-30 ENCOUNTER — TRANSCRIBE ORDERS (OUTPATIENT)
Dept: LAB | Facility: HOSPITAL | Age: 69
End: 2019-08-30

## 2019-08-30 ENCOUNTER — APPOINTMENT (OUTPATIENT)
Dept: LAB | Facility: HOSPITAL | Age: 69
End: 2019-08-30
Payer: MEDICARE

## 2019-08-30 DIAGNOSIS — B19.20 HEPATITIS C VIRUS INFECTION WITHOUT HEPATIC COMA, UNSPECIFIED CHRONICITY: ICD-10-CM

## 2019-08-30 DIAGNOSIS — R79.89 ABNORMAL LIVER FUNCTION TESTS: Primary | ICD-10-CM

## 2019-08-30 DIAGNOSIS — R79.89 ABNORMAL LIVER FUNCTION TESTS: ICD-10-CM

## 2019-08-30 PROCEDURE — 87522 HEPATITIS C REVRS TRNSCRPJ: CPT

## 2019-08-30 PROCEDURE — 36415 COLL VENOUS BLD VENIPUNCTURE: CPT

## 2019-09-04 LAB
HCV RNA SERPL NAA+PROBE-ACNC: NORMAL IU/ML
TEST INFORMATION: NORMAL

## 2019-11-05 ENCOUNTER — TRANSCRIBE ORDERS (OUTPATIENT)
Dept: ADMINISTRATIVE | Facility: HOSPITAL | Age: 69
End: 2019-11-05

## 2019-11-05 ENCOUNTER — APPOINTMENT (OUTPATIENT)
Dept: LAB | Facility: HOSPITAL | Age: 69
End: 2019-11-05
Attending: INTERNAL MEDICINE
Payer: MEDICARE

## 2019-11-05 DIAGNOSIS — Z86.19 HISTORY OF HEPATITIS C: Primary | ICD-10-CM

## 2019-11-05 DIAGNOSIS — Z86.19 HISTORY OF HEPATITIS C: ICD-10-CM

## 2019-11-05 PROCEDURE — 36415 COLL VENOUS BLD VENIPUNCTURE: CPT

## 2019-11-05 PROCEDURE — 87522 HEPATITIS C REVRS TRNSCRPJ: CPT

## 2019-11-07 LAB
HCV RNA SERPL NAA+PROBE-ACNC: NORMAL IU/ML
TEST INFORMATION: NORMAL

## 2019-12-09 ENCOUNTER — TRANSCRIBE ORDERS (OUTPATIENT)
Dept: ADMINISTRATIVE | Facility: HOSPITAL | Age: 69
End: 2019-12-09

## 2019-12-09 ENCOUNTER — APPOINTMENT (OUTPATIENT)
Dept: LAB | Facility: HOSPITAL | Age: 69
End: 2019-12-09
Attending: INTERNAL MEDICINE
Payer: MEDICARE

## 2019-12-09 DIAGNOSIS — N42.9 PROSTATE DISORDER: ICD-10-CM

## 2019-12-09 DIAGNOSIS — B19.20 HEPATITIS C VIRUS INFECTION WITHOUT HEPATIC COMA, UNSPECIFIED CHRONICITY: ICD-10-CM

## 2019-12-09 DIAGNOSIS — E78.2 MIXED HYPERLIPIDEMIA: ICD-10-CM

## 2019-12-09 DIAGNOSIS — E78.2 MIXED HYPERLIPIDEMIA: Primary | ICD-10-CM

## 2019-12-09 DIAGNOSIS — Z79.01 LONG TERM (CURRENT) USE OF ANTICOAGULANTS: ICD-10-CM

## 2019-12-09 LAB
BASOPHILS # BLD AUTO: 0.09 THOUSANDS/ΜL (ref 0–0.1)
BASOPHILS NFR BLD AUTO: 1 % (ref 0–1)
CHOLEST SERPL-MCNC: 179 MG/DL (ref 50–200)
EOSINOPHIL # BLD AUTO: 0.34 THOUSAND/ΜL (ref 0–0.61)
EOSINOPHIL NFR BLD AUTO: 5 % (ref 0–6)
ERYTHROCYTE [DISTWIDTH] IN BLOOD BY AUTOMATED COUNT: 12.5 % (ref 11.6–15.1)
HCT VFR BLD AUTO: 46.1 % (ref 36.5–49.3)
HDLC SERPL-MCNC: 37 MG/DL
HGB BLD-MCNC: 14.8 G/DL (ref 12–17)
IMM GRANULOCYTES # BLD AUTO: 0.02 THOUSAND/UL (ref 0–0.2)
IMM GRANULOCYTES NFR BLD AUTO: 0 % (ref 0–2)
LDLC SERPL CALC-MCNC: 123 MG/DL (ref 0–100)
LYMPHOCYTES # BLD AUTO: 1.72 THOUSANDS/ΜL (ref 0.6–4.47)
LYMPHOCYTES NFR BLD AUTO: 24 % (ref 14–44)
MCH RBC QN AUTO: 30.6 PG (ref 26.8–34.3)
MCHC RBC AUTO-ENTMCNC: 32.1 G/DL (ref 31.4–37.4)
MCV RBC AUTO: 95 FL (ref 82–98)
MONOCYTES # BLD AUTO: 0.64 THOUSAND/ΜL (ref 0.17–1.22)
MONOCYTES NFR BLD AUTO: 9 % (ref 4–12)
NEUTROPHILS # BLD AUTO: 4.51 THOUSANDS/ΜL (ref 1.85–7.62)
NEUTS SEG NFR BLD AUTO: 61 % (ref 43–75)
NONHDLC SERPL-MCNC: 142 MG/DL
NRBC BLD AUTO-RTO: 0 /100 WBCS
PLATELET # BLD AUTO: 251 THOUSANDS/UL (ref 149–390)
PMV BLD AUTO: 10.7 FL (ref 8.9–12.7)
PSA SERPL-MCNC: 1.2 NG/ML (ref 0–4)
RBC # BLD AUTO: 4.84 MILLION/UL (ref 3.88–5.62)
TRIGL SERPL-MCNC: 93 MG/DL
WBC # BLD AUTO: 7.32 THOUSAND/UL (ref 4.31–10.16)

## 2019-12-09 PROCEDURE — 87522 HEPATITIS C REVRS TRNSCRPJ: CPT

## 2019-12-09 PROCEDURE — 85025 COMPLETE CBC W/AUTO DIFF WBC: CPT

## 2019-12-09 PROCEDURE — 36415 COLL VENOUS BLD VENIPUNCTURE: CPT

## 2019-12-09 PROCEDURE — G0103 PSA SCREENING: HCPCS

## 2019-12-09 PROCEDURE — 80061 LIPID PANEL: CPT

## 2019-12-13 LAB
HCV RNA SERPL NAA+PROBE-ACNC: NORMAL IU/ML
TEST INFORMATION: NORMAL

## 2020-03-03 ENCOUNTER — APPOINTMENT (OUTPATIENT)
Dept: LAB | Facility: CLINIC | Age: 70
End: 2020-03-03
Payer: MEDICARE

## 2020-03-03 ENCOUNTER — TRANSCRIBE ORDERS (OUTPATIENT)
Dept: URGENT CARE | Facility: CLINIC | Age: 70
End: 2020-03-03

## 2020-03-03 DIAGNOSIS — E78.2 MIXED HYPERLIPIDEMIA: Primary | ICD-10-CM

## 2020-03-03 DIAGNOSIS — N42.9 PROSTATE DISORDER: ICD-10-CM

## 2020-03-03 DIAGNOSIS — E78.2 MIXED HYPERLIPIDEMIA: ICD-10-CM

## 2020-03-03 LAB
ALBUMIN SERPL BCP-MCNC: 4.3 G/DL (ref 3.5–5)
ALP SERPL-CCNC: 71 U/L (ref 46–116)
ALT SERPL W P-5'-P-CCNC: 20 U/L (ref 12–78)
ANION GAP SERPL CALCULATED.3IONS-SCNC: 1 MMOL/L (ref 4–13)
AST SERPL W P-5'-P-CCNC: 10 U/L (ref 5–45)
BASOPHILS # BLD AUTO: 0.08 THOUSANDS/ΜL (ref 0–0.1)
BASOPHILS NFR BLD AUTO: 1 % (ref 0–1)
BILIRUB SERPL-MCNC: 1.2 MG/DL (ref 0.2–1)
BUN SERPL-MCNC: 21 MG/DL (ref 5–25)
CALCIUM SERPL-MCNC: 9 MG/DL (ref 8.3–10.1)
CHLORIDE SERPL-SCNC: 108 MMOL/L (ref 100–108)
CHOLEST SERPL-MCNC: 176 MG/DL (ref 50–200)
CO2 SERPL-SCNC: 30 MMOL/L (ref 21–32)
CREAT SERPL-MCNC: 1.1 MG/DL (ref 0.6–1.3)
EOSINOPHIL # BLD AUTO: 0.28 THOUSAND/ΜL (ref 0–0.61)
EOSINOPHIL NFR BLD AUTO: 5 % (ref 0–6)
ERYTHROCYTE [DISTWIDTH] IN BLOOD BY AUTOMATED COUNT: 12.2 % (ref 11.6–15.1)
GFR SERPL CREATININE-BSD FRML MDRD: 67 ML/MIN/1.73SQ M
GLUCOSE P FAST SERPL-MCNC: 91 MG/DL (ref 65–99)
HCT VFR BLD AUTO: 44.9 % (ref 36.5–49.3)
HDLC SERPL-MCNC: 30 MG/DL
HGB BLD-MCNC: 15.2 G/DL (ref 12–17)
IMM GRANULOCYTES # BLD AUTO: 0.02 THOUSAND/UL (ref 0–0.2)
IMM GRANULOCYTES NFR BLD AUTO: 0 % (ref 0–2)
LDLC SERPL CALC-MCNC: 129 MG/DL (ref 0–100)
LYMPHOCYTES # BLD AUTO: 1.64 THOUSANDS/ΜL (ref 0.6–4.47)
LYMPHOCYTES NFR BLD AUTO: 26 % (ref 14–44)
MCH RBC QN AUTO: 31.5 PG (ref 26.8–34.3)
MCHC RBC AUTO-ENTMCNC: 33.9 G/DL (ref 31.4–37.4)
MCV RBC AUTO: 93 FL (ref 82–98)
MONOCYTES # BLD AUTO: 0.58 THOUSAND/ΜL (ref 0.17–1.22)
MONOCYTES NFR BLD AUTO: 9 % (ref 4–12)
NEUTROPHILS # BLD AUTO: 3.65 THOUSANDS/ΜL (ref 1.85–7.62)
NEUTS SEG NFR BLD AUTO: 59 % (ref 43–75)
NONHDLC SERPL-MCNC: 146 MG/DL
NRBC BLD AUTO-RTO: 0 /100 WBCS
PLATELET # BLD AUTO: 267 THOUSANDS/UL (ref 149–390)
PMV BLD AUTO: 10.6 FL (ref 8.9–12.7)
POTASSIUM SERPL-SCNC: 4.5 MMOL/L (ref 3.5–5.3)
PROT SERPL-MCNC: 7.3 G/DL (ref 6.4–8.2)
PSA SERPL-MCNC: 1.4 NG/ML (ref 0–4)
RBC # BLD AUTO: 4.82 MILLION/UL (ref 3.88–5.62)
SODIUM SERPL-SCNC: 139 MMOL/L (ref 136–145)
TRIGL SERPL-MCNC: 87 MG/DL
WBC # BLD AUTO: 6.25 THOUSAND/UL (ref 4.31–10.16)

## 2020-03-03 PROCEDURE — G0103 PSA SCREENING: HCPCS

## 2020-03-03 PROCEDURE — 80061 LIPID PANEL: CPT

## 2020-03-03 PROCEDURE — 85025 COMPLETE CBC W/AUTO DIFF WBC: CPT | Performed by: INTERNAL MEDICINE

## 2020-03-03 PROCEDURE — 36415 COLL VENOUS BLD VENIPUNCTURE: CPT | Performed by: INTERNAL MEDICINE

## 2020-03-03 PROCEDURE — 80053 COMPREHEN METABOLIC PANEL: CPT

## 2020-09-30 ENCOUNTER — APPOINTMENT (OUTPATIENT)
Dept: LAB | Facility: CLINIC | Age: 70
End: 2020-09-30
Payer: MEDICARE

## 2020-09-30 ENCOUNTER — TRANSCRIBE ORDERS (OUTPATIENT)
Dept: URGENT CARE | Facility: CLINIC | Age: 70
End: 2020-09-30

## 2020-09-30 DIAGNOSIS — Z79.01 LONG TERM (CURRENT) USE OF ANTICOAGULANTS: ICD-10-CM

## 2020-09-30 DIAGNOSIS — Z79.01 LONG TERM (CURRENT) USE OF ANTICOAGULANTS: Primary | ICD-10-CM

## 2020-09-30 LAB
ANION GAP SERPL CALCULATED.3IONS-SCNC: 2 MMOL/L (ref 4–13)
BUN SERPL-MCNC: 18 MG/DL (ref 5–25)
CALCIUM SERPL-MCNC: 9 MG/DL (ref 8.3–10.1)
CHLORIDE SERPL-SCNC: 111 MMOL/L (ref 100–108)
CO2 SERPL-SCNC: 26 MMOL/L (ref 21–32)
CREAT SERPL-MCNC: 1.04 MG/DL (ref 0.6–1.3)
ERYTHROCYTE [DISTWIDTH] IN BLOOD BY AUTOMATED COUNT: 12.3 % (ref 11.6–15.1)
GFR SERPL CREATININE-BSD FRML MDRD: 71 ML/MIN/1.73SQ M
GLUCOSE P FAST SERPL-MCNC: 91 MG/DL (ref 65–99)
HCT VFR BLD AUTO: 43.5 % (ref 36.5–49.3)
HGB BLD-MCNC: 14.5 G/DL (ref 12–17)
MCH RBC QN AUTO: 31.9 PG (ref 26.8–34.3)
MCHC RBC AUTO-ENTMCNC: 33.3 G/DL (ref 31.4–37.4)
MCV RBC AUTO: 96 FL (ref 82–98)
PLATELET # BLD AUTO: 224 THOUSANDS/UL (ref 149–390)
PMV BLD AUTO: 10.9 FL (ref 8.9–12.7)
POTASSIUM SERPL-SCNC: 4.5 MMOL/L (ref 3.5–5.3)
RBC # BLD AUTO: 4.54 MILLION/UL (ref 3.88–5.62)
SODIUM SERPL-SCNC: 139 MMOL/L (ref 136–145)
WBC # BLD AUTO: 6.36 THOUSAND/UL (ref 4.31–10.16)

## 2020-09-30 PROCEDURE — 36415 COLL VENOUS BLD VENIPUNCTURE: CPT

## 2020-09-30 PROCEDURE — 85027 COMPLETE CBC AUTOMATED: CPT

## 2020-09-30 PROCEDURE — 80048 BASIC METABOLIC PNL TOTAL CA: CPT

## 2021-03-20 ENCOUNTER — APPOINTMENT (OUTPATIENT)
Dept: LAB | Facility: CLINIC | Age: 71
End: 2021-03-20
Payer: MEDICARE

## 2021-03-20 ENCOUNTER — TRANSCRIBE ORDERS (OUTPATIENT)
Dept: LAB | Facility: CLINIC | Age: 71
End: 2021-03-20

## 2021-03-20 DIAGNOSIS — N42.9 DISEASE OF PROSTATE: ICD-10-CM

## 2021-03-20 DIAGNOSIS — Z79.01 LONG TERM (CURRENT) USE OF ANTICOAGULANTS: ICD-10-CM

## 2021-03-20 DIAGNOSIS — I10 ESSENTIAL HYPERTENSION, MALIGNANT: ICD-10-CM

## 2021-03-20 DIAGNOSIS — I10 ESSENTIAL HYPERTENSION, MALIGNANT: Primary | ICD-10-CM

## 2021-03-20 LAB
ALBUMIN SERPL BCP-MCNC: 4.2 G/DL (ref 3.5–5)
ALP SERPL-CCNC: 77 U/L (ref 46–116)
ALT SERPL W P-5'-P-CCNC: 26 U/L (ref 12–78)
ANION GAP SERPL CALCULATED.3IONS-SCNC: 4 MMOL/L (ref 4–13)
AST SERPL W P-5'-P-CCNC: 15 U/L (ref 5–45)
BILIRUB SERPL-MCNC: 0.86 MG/DL (ref 0.2–1)
BUN SERPL-MCNC: 16 MG/DL (ref 5–25)
CALCIUM SERPL-MCNC: 9.1 MG/DL (ref 8.3–10.1)
CHLORIDE SERPL-SCNC: 106 MMOL/L (ref 100–108)
CHOLEST SERPL-MCNC: 122 MG/DL (ref 50–200)
CO2 SERPL-SCNC: 30 MMOL/L (ref 21–32)
CREAT SERPL-MCNC: 1.25 MG/DL (ref 0.6–1.3)
CREAT UR-MCNC: 103 MG/DL
ERYTHROCYTE [DISTWIDTH] IN BLOOD BY AUTOMATED COUNT: 12 % (ref 11.6–15.1)
GFR SERPL CREATININE-BSD FRML MDRD: 57 ML/MIN/1.73SQ M
GLUCOSE P FAST SERPL-MCNC: 95 MG/DL (ref 65–99)
HCT VFR BLD AUTO: 47.8 % (ref 36.5–49.3)
HDLC SERPL-MCNC: 35 MG/DL
HGB BLD-MCNC: 15.5 G/DL (ref 12–17)
LDLC SERPL CALC-MCNC: 68 MG/DL (ref 0–100)
MCH RBC QN AUTO: 31.6 PG (ref 26.8–34.3)
MCHC RBC AUTO-ENTMCNC: 32.4 G/DL (ref 31.4–37.4)
MCV RBC AUTO: 98 FL (ref 82–98)
MICROALBUMIN UR-MCNC: 19.9 MG/L (ref 0–20)
MICROALBUMIN/CREAT 24H UR: 19 MG/G CREATININE (ref 0–30)
NONHDLC SERPL-MCNC: 87 MG/DL
PLATELET # BLD AUTO: 254 THOUSANDS/UL (ref 149–390)
PMV BLD AUTO: 10.8 FL (ref 8.9–12.7)
POTASSIUM SERPL-SCNC: 4.4 MMOL/L (ref 3.5–5.3)
PROT SERPL-MCNC: 7.3 G/DL (ref 6.4–8.2)
PSA SERPL-MCNC: 1.8 NG/ML (ref 0–4)
RBC # BLD AUTO: 4.9 MILLION/UL (ref 3.88–5.62)
SODIUM SERPL-SCNC: 140 MMOL/L (ref 136–145)
TRIGL SERPL-MCNC: 93 MG/DL
WBC # BLD AUTO: 6.48 THOUSAND/UL (ref 4.31–10.16)

## 2021-03-20 PROCEDURE — 80053 COMPREHEN METABOLIC PANEL: CPT

## 2021-03-20 PROCEDURE — 80061 LIPID PANEL: CPT

## 2021-03-20 PROCEDURE — 36415 COLL VENOUS BLD VENIPUNCTURE: CPT

## 2021-03-20 PROCEDURE — 82043 UR ALBUMIN QUANTITATIVE: CPT | Performed by: INTERNAL MEDICINE

## 2021-03-20 PROCEDURE — 85027 COMPLETE CBC AUTOMATED: CPT

## 2021-03-20 PROCEDURE — G0103 PSA SCREENING: HCPCS

## 2021-03-20 PROCEDURE — 82570 ASSAY OF URINE CREATININE: CPT | Performed by: INTERNAL MEDICINE

## 2021-10-18 ENCOUNTER — APPOINTMENT (OUTPATIENT)
Dept: LAB | Facility: CLINIC | Age: 71
End: 2021-10-18
Payer: MEDICARE

## 2021-10-18 DIAGNOSIS — N18.30 STAGE 3 CHRONIC KIDNEY DISEASE, UNSPECIFIED WHETHER STAGE 3A OR 3B CKD (HCC): ICD-10-CM

## 2021-10-18 DIAGNOSIS — Z79.01 LONG TERM (CURRENT) USE OF ANTICOAGULANTS: ICD-10-CM

## 2021-10-18 LAB
ANION GAP SERPL CALCULATED.3IONS-SCNC: 3 MMOL/L (ref 4–13)
BUN SERPL-MCNC: 17 MG/DL (ref 5–25)
CALCIUM SERPL-MCNC: 9 MG/DL (ref 8.3–10.1)
CHLORIDE SERPL-SCNC: 107 MMOL/L (ref 100–108)
CO2 SERPL-SCNC: 28 MMOL/L (ref 21–32)
CREAT SERPL-MCNC: 1 MG/DL (ref 0.6–1.3)
ERYTHROCYTE [DISTWIDTH] IN BLOOD BY AUTOMATED COUNT: 12.8 % (ref 11.6–15.1)
GFR SERPL CREATININE-BSD FRML MDRD: 74 ML/MIN/1.73SQ M
GLUCOSE P FAST SERPL-MCNC: 97 MG/DL (ref 65–99)
HCT VFR BLD AUTO: 45.1 % (ref 36.5–49.3)
HGB BLD-MCNC: 14.7 G/DL (ref 12–17)
MCH RBC QN AUTO: 31.3 PG (ref 26.8–34.3)
MCHC RBC AUTO-ENTMCNC: 32.6 G/DL (ref 31.4–37.4)
MCV RBC AUTO: 96 FL (ref 82–98)
PLATELET # BLD AUTO: 252 THOUSANDS/UL (ref 149–390)
PMV BLD AUTO: 10.9 FL (ref 8.9–12.7)
POTASSIUM SERPL-SCNC: 4.4 MMOL/L (ref 3.5–5.3)
RBC # BLD AUTO: 4.7 MILLION/UL (ref 3.88–5.62)
SODIUM SERPL-SCNC: 138 MMOL/L (ref 136–145)
WBC # BLD AUTO: 6.36 THOUSAND/UL (ref 4.31–10.16)

## 2021-10-18 PROCEDURE — 80048 BASIC METABOLIC PNL TOTAL CA: CPT

## 2021-10-18 PROCEDURE — 85027 COMPLETE CBC AUTOMATED: CPT

## 2021-10-18 PROCEDURE — 36415 COLL VENOUS BLD VENIPUNCTURE: CPT

## 2022-04-23 ENCOUNTER — APPOINTMENT (OUTPATIENT)
Dept: LAB | Facility: CLINIC | Age: 72
End: 2022-04-23
Payer: MEDICARE

## 2022-04-23 DIAGNOSIS — Z79.01 LONG TERM CURRENT USE OF ANTICOAGULANT: ICD-10-CM

## 2022-04-23 DIAGNOSIS — N42.9 PROSTATE DISORDER: ICD-10-CM

## 2022-04-23 DIAGNOSIS — I10 HYPERTENSION, UNSPECIFIED TYPE: ICD-10-CM

## 2022-04-23 DIAGNOSIS — I25.10 ASCVD (ARTERIOSCLEROTIC CARDIOVASCULAR DISEASE): ICD-10-CM

## 2022-04-23 LAB
ALBUMIN SERPL BCP-MCNC: 4 G/DL (ref 3.5–5)
ALP SERPL-CCNC: 62 U/L (ref 46–116)
ALT SERPL W P-5'-P-CCNC: 25 U/L (ref 12–78)
ANION GAP SERPL CALCULATED.3IONS-SCNC: 4 MMOL/L (ref 4–13)
AST SERPL W P-5'-P-CCNC: 18 U/L (ref 5–45)
BILIRUB SERPL-MCNC: 0.97 MG/DL (ref 0.2–1)
BUN SERPL-MCNC: 24 MG/DL (ref 5–25)
CALCIUM SERPL-MCNC: 9.1 MG/DL (ref 8.3–10.1)
CHLORIDE SERPL-SCNC: 109 MMOL/L (ref 100–108)
CHOLEST SERPL-MCNC: 138 MG/DL
CO2 SERPL-SCNC: 26 MMOL/L (ref 21–32)
CREAT SERPL-MCNC: 1.13 MG/DL (ref 0.6–1.3)
ERYTHROCYTE [DISTWIDTH] IN BLOOD BY AUTOMATED COUNT: 12.5 % (ref 11.6–15.1)
GFR SERPL CREATININE-BSD FRML MDRD: 64 ML/MIN/1.73SQ M
GLUCOSE P FAST SERPL-MCNC: 92 MG/DL (ref 65–99)
HCT VFR BLD AUTO: 44.3 % (ref 36.5–49.3)
HDLC SERPL-MCNC: 36 MG/DL
HGB BLD-MCNC: 14.6 G/DL (ref 12–17)
LDLC SERPL CALC-MCNC: 86 MG/DL (ref 0–100)
MCH RBC QN AUTO: 31.5 PG (ref 26.8–34.3)
MCHC RBC AUTO-ENTMCNC: 33 G/DL (ref 31.4–37.4)
MCV RBC AUTO: 96 FL (ref 82–98)
NONHDLC SERPL-MCNC: 102 MG/DL
PLATELET # BLD AUTO: 245 THOUSANDS/UL (ref 149–390)
PMV BLD AUTO: 10.6 FL (ref 8.9–12.7)
POTASSIUM SERPL-SCNC: 4.3 MMOL/L (ref 3.5–5.3)
PROT SERPL-MCNC: 7.1 G/DL (ref 6.4–8.2)
PSA SERPL-MCNC: 2.2 NG/ML (ref 0–4)
RBC # BLD AUTO: 4.63 MILLION/UL (ref 3.88–5.62)
SODIUM SERPL-SCNC: 139 MMOL/L (ref 136–145)
TRIGL SERPL-MCNC: 81 MG/DL
WBC # BLD AUTO: 5.53 THOUSAND/UL (ref 4.31–10.16)

## 2022-04-23 PROCEDURE — 85027 COMPLETE CBC AUTOMATED: CPT

## 2022-04-23 PROCEDURE — 84153 ASSAY OF PSA TOTAL: CPT

## 2022-04-23 PROCEDURE — 80061 LIPID PANEL: CPT

## 2022-04-23 PROCEDURE — 36415 COLL VENOUS BLD VENIPUNCTURE: CPT

## 2022-04-23 PROCEDURE — 80053 COMPREHEN METABOLIC PANEL: CPT

## 2022-05-05 ENCOUNTER — APPOINTMENT (OUTPATIENT)
Dept: LAB | Facility: HOSPITAL | Age: 72
End: 2022-05-05
Attending: INTERNAL MEDICINE
Payer: MEDICARE

## 2022-05-05 ENCOUNTER — HOSPITAL ENCOUNTER (OUTPATIENT)
Dept: ULTRASOUND IMAGING | Facility: HOSPITAL | Age: 72
Discharge: HOME | End: 2022-05-05
Attending: INTERNAL MEDICINE
Payer: MEDICARE

## 2022-05-05 DIAGNOSIS — B19.20 HEPATITIS C VIRUS INFECTION WITHOUT HEPATIC COMA, UNSPECIFIED CHRONICITY: ICD-10-CM

## 2022-05-05 LAB — AFP-TM SERPL-MCNC: 3 NG/ML (ref 0.5–8)

## 2022-05-05 PROCEDURE — 76705 ECHO EXAM OF ABDOMEN: CPT

## 2022-05-05 PROCEDURE — 82105 ALPHA-FETOPROTEIN SERUM: CPT

## 2022-05-05 PROCEDURE — 87522 HEPATITIS C REVRS TRNSCRPJ: CPT

## 2022-05-05 PROCEDURE — 36415 COLL VENOUS BLD VENIPUNCTURE: CPT

## 2022-05-09 LAB
HCV RNA SERPL NAA+PROBE-ACNC: NORMAL IU/ML
TEST INFORMATION: NORMAL

## 2022-08-08 ENCOUNTER — APPOINTMENT (OUTPATIENT)
Dept: RADIOLOGY | Facility: CLINIC | Age: 72
End: 2022-08-08
Payer: MEDICARE

## 2022-08-08 ENCOUNTER — APPOINTMENT (OUTPATIENT)
Dept: LAB | Facility: CLINIC | Age: 72
End: 2022-08-08
Payer: MEDICARE

## 2022-08-08 DIAGNOSIS — M79.671 RIGHT FOOT PAIN: ICD-10-CM

## 2022-08-08 LAB — URATE SERPL-MCNC: 7.6 MG/DL (ref 3.5–8.5)

## 2022-08-08 PROCEDURE — 36415 COLL VENOUS BLD VENIPUNCTURE: CPT

## 2022-08-08 PROCEDURE — 84550 ASSAY OF BLOOD/URIC ACID: CPT

## 2022-08-08 PROCEDURE — 73610 X-RAY EXAM OF ANKLE: CPT

## 2022-08-08 PROCEDURE — 73630 X-RAY EXAM OF FOOT: CPT

## 2022-12-20 ENCOUNTER — APPOINTMENT (OUTPATIENT)
Dept: LAB | Facility: CLINIC | Age: 72
End: 2022-12-20

## 2022-12-20 DIAGNOSIS — I48.21 PERMANENT ATRIAL FIBRILLATION (HCC): ICD-10-CM

## 2022-12-20 LAB
ANION GAP SERPL CALCULATED.3IONS-SCNC: 4 MMOL/L (ref 4–13)
BASOPHILS # BLD AUTO: 0.09 THOUSANDS/ÂΜL (ref 0–0.1)
BASOPHILS NFR BLD AUTO: 1 % (ref 0–1)
BUN SERPL-MCNC: 17 MG/DL (ref 5–25)
CALCIUM SERPL-MCNC: 9.1 MG/DL (ref 8.3–10.1)
CHLORIDE SERPL-SCNC: 105 MMOL/L (ref 96–108)
CO2 SERPL-SCNC: 30 MMOL/L (ref 21–32)
CREAT SERPL-MCNC: 1.09 MG/DL (ref 0.6–1.3)
EOSINOPHIL # BLD AUTO: 0.31 THOUSAND/ÂΜL (ref 0–0.61)
EOSINOPHIL NFR BLD AUTO: 5 % (ref 0–6)
ERYTHROCYTE [DISTWIDTH] IN BLOOD BY AUTOMATED COUNT: 12.4 % (ref 11.6–15.1)
GFR SERPL CREATININE-BSD FRML MDRD: 66 ML/MIN/1.73SQ M
GLUCOSE P FAST SERPL-MCNC: 99 MG/DL (ref 65–99)
HCT VFR BLD AUTO: 45.2 % (ref 36.5–49.3)
HGB BLD-MCNC: 14.8 G/DL (ref 12–17)
IMM GRANULOCYTES # BLD AUTO: 0.02 THOUSAND/UL (ref 0–0.2)
IMM GRANULOCYTES NFR BLD AUTO: 0 % (ref 0–2)
LYMPHOCYTES # BLD AUTO: 1.47 THOUSANDS/ÂΜL (ref 0.6–4.47)
LYMPHOCYTES NFR BLD AUTO: 22 % (ref 14–44)
MCH RBC QN AUTO: 30.8 PG (ref 26.8–34.3)
MCHC RBC AUTO-ENTMCNC: 32.7 G/DL (ref 31.4–37.4)
MCV RBC AUTO: 94 FL (ref 82–98)
MONOCYTES # BLD AUTO: 0.53 THOUSAND/ÂΜL (ref 0.17–1.22)
MONOCYTES NFR BLD AUTO: 8 % (ref 4–12)
NEUTROPHILS # BLD AUTO: 4.43 THOUSANDS/ÂΜL (ref 1.85–7.62)
NEUTS SEG NFR BLD AUTO: 64 % (ref 43–75)
NRBC BLD AUTO-RTO: 0 /100 WBCS
PLATELET # BLD AUTO: 329 THOUSANDS/UL (ref 149–390)
PMV BLD AUTO: 9.5 FL (ref 8.9–12.7)
POTASSIUM SERPL-SCNC: 4.6 MMOL/L (ref 3.5–5.3)
RBC # BLD AUTO: 4.81 MILLION/UL (ref 3.88–5.62)
SODIUM SERPL-SCNC: 139 MMOL/L (ref 135–147)
WBC # BLD AUTO: 6.85 THOUSAND/UL (ref 4.31–10.16)

## 2023-01-02 ENCOUNTER — HOSPITAL ENCOUNTER (INPATIENT)
Facility: HOSPITAL | Age: 73
LOS: 2 days | Discharge: HOME | End: 2023-01-04
Attending: EMERGENCY MEDICINE | Admitting: ORTHOPAEDIC SURGERY

## 2023-01-02 ENCOUNTER — APPOINTMENT (EMERGENCY)
Dept: RADIOLOGY | Facility: HOSPITAL | Age: 73
End: 2023-01-02

## 2023-01-02 ENCOUNTER — APPOINTMENT (INPATIENT)
Dept: RADIOLOGY | Facility: HOSPITAL | Age: 73
End: 2023-01-02

## 2023-01-02 ENCOUNTER — ANESTHESIA EVENT (INPATIENT)
Dept: PERIOP | Facility: HOSPITAL | Age: 73
End: 2023-01-02

## 2023-01-02 DIAGNOSIS — S62.90XA: Primary | ICD-10-CM

## 2023-01-02 DIAGNOSIS — S62.321B OPEN DISPLACED FRACTURE OF SHAFT OF SECOND METACARPAL BONE OF LEFT HAND, INITIAL ENCOUNTER: ICD-10-CM

## 2023-01-02 DIAGNOSIS — I48.0 PAROXYSMAL ATRIAL FIBRILLATION (HCC): ICD-10-CM

## 2023-01-02 DIAGNOSIS — S62.643B OPEN NONDISPLACED FRACTURE OF PROXIMAL PHALANX OF LEFT MIDDLE FINGER, INITIAL ENCOUNTER: ICD-10-CM

## 2023-01-02 DIAGNOSIS — S62.323B OPEN DISPLACED FRACTURE OF SHAFT OF THIRD METACARPAL BONE OF LEFT HAND, INITIAL ENCOUNTER: ICD-10-CM

## 2023-01-02 LAB
ANION GAP SERPL CALCULATED.3IONS-SCNC: 5 MMOL/L (ref 4–13)
APTT PPP: 31 SECONDS (ref 23–37)
ATRIAL RATE: 202 BPM
BASOPHILS # BLD AUTO: 0.08 THOUSANDS/ÂΜL (ref 0–0.1)
BASOPHILS NFR BLD AUTO: 1 % (ref 0–1)
BUN SERPL-MCNC: 22 MG/DL (ref 5–25)
CALCIUM SERPL-MCNC: 8.5 MG/DL (ref 8.3–10.1)
CHLORIDE SERPL-SCNC: 106 MMOL/L (ref 96–108)
CO2 SERPL-SCNC: 26 MMOL/L (ref 21–32)
CREAT SERPL-MCNC: 1.63 MG/DL (ref 0.6–1.3)
EOSINOPHIL # BLD AUTO: 0.13 THOUSAND/ÂΜL (ref 0–0.61)
EOSINOPHIL NFR BLD AUTO: 1 % (ref 0–6)
ERYTHROCYTE [DISTWIDTH] IN BLOOD BY AUTOMATED COUNT: 12 % (ref 11.6–15.1)
GFR SERPL CREATININE-BSD FRML MDRD: 40 ML/MIN/1.73SQ M
GLUCOSE SERPL-MCNC: 151 MG/DL (ref 65–140)
HCT VFR BLD AUTO: 40.7 % (ref 36.5–49.3)
HGB BLD-MCNC: 13 G/DL (ref 12–17)
IMM GRANULOCYTES # BLD AUTO: 0.03 THOUSAND/UL (ref 0–0.2)
IMM GRANULOCYTES NFR BLD AUTO: 0 % (ref 0–2)
INR PPP: 1.24 (ref 0.84–1.19)
LYMPHOCYTES # BLD AUTO: 1.52 THOUSANDS/ÂΜL (ref 0.6–4.47)
LYMPHOCYTES NFR BLD AUTO: 16 % (ref 14–44)
MCH RBC QN AUTO: 30 PG (ref 26.8–34.3)
MCHC RBC AUTO-ENTMCNC: 31.9 G/DL (ref 31.4–37.4)
MCV RBC AUTO: 94 FL (ref 82–98)
MONOCYTES # BLD AUTO: 0.55 THOUSAND/ÂΜL (ref 0.17–1.22)
MONOCYTES NFR BLD AUTO: 6 % (ref 4–12)
NEUTROPHILS # BLD AUTO: 7.17 THOUSANDS/ÂΜL (ref 1.85–7.62)
NEUTS SEG NFR BLD AUTO: 76 % (ref 43–75)
NRBC BLD AUTO-RTO: 0 /100 WBCS
PLATELET # BLD AUTO: 281 THOUSANDS/UL (ref 149–390)
PMV BLD AUTO: 9.9 FL (ref 8.9–12.7)
POTASSIUM SERPL-SCNC: 5 MMOL/L (ref 3.5–5.3)
PROTHROMBIN TIME: 15.8 SECONDS (ref 11.6–14.5)
QRS AXIS: 67 DEGREES
QRSD INTERVAL: 92 MS
QT INTERVAL: 434 MS
QTC INTERVAL: 426 MS
RBC # BLD AUTO: 4.33 MILLION/UL (ref 3.88–5.62)
SODIUM SERPL-SCNC: 137 MMOL/L (ref 135–147)
T WAVE AXIS: 70 DEGREES
VENTRICULAR RATE: 58 BPM
WBC # BLD AUTO: 9.48 THOUSAND/UL (ref 4.31–10.16)

## 2023-01-02 PROCEDURE — 0PSVXZZ REPOSITION LEFT FINGER PHALANX, EXTERNAL APPROACH: ICD-10-PCS | Performed by: STUDENT IN AN ORGANIZED HEALTH CARE EDUCATION/TRAINING PROGRAM

## 2023-01-02 RX ORDER — CHLORHEXIDINE GLUCONATE 0.12 MG/ML
15 RINSE ORAL ONCE
Status: CANCELLED | OUTPATIENT
Start: 2023-01-02 | End: 2023-01-02

## 2023-01-02 RX ORDER — CEFAZOLIN SODIUM 2 G/50ML
2000 SOLUTION INTRAVENOUS ONCE
Status: COMPLETED | OUTPATIENT
Start: 2023-01-02 | End: 2023-01-02

## 2023-01-02 RX ORDER — MORPHINE SULFATE 10 MG/ML
10 INJECTION, SOLUTION INTRAMUSCULAR; INTRAVENOUS ONCE
Status: DISCONTINUED | OUTPATIENT
Start: 2023-01-02 | End: 2023-01-02

## 2023-01-02 RX ORDER — CEFAZOLIN SODIUM 2 G/50ML
2000 SOLUTION INTRAVENOUS EVERY 8 HOURS
Status: DISCONTINUED | OUTPATIENT
Start: 2023-01-02 | End: 2023-01-04 | Stop reason: HOSPADM

## 2023-01-02 RX ORDER — FLECAINIDE ACETATE 50 MG/1
50 TABLET ORAL 2 TIMES DAILY
Status: DISCONTINUED | OUTPATIENT
Start: 2023-01-02 | End: 2023-01-04 | Stop reason: HOSPADM

## 2023-01-02 RX ORDER — ACETAMINOPHEN 325 MG/1
975 TABLET ORAL EVERY 8 HOURS SCHEDULED
Status: DISCONTINUED | OUTPATIENT
Start: 2023-01-02 | End: 2023-01-03

## 2023-01-02 RX ORDER — LIDOCAINE HYDROCHLORIDE 10 MG/ML
20 INJECTION, SOLUTION EPIDURAL; INFILTRATION; INTRACAUDAL; PERINEURAL ONCE
Status: COMPLETED | OUTPATIENT
Start: 2023-01-02 | End: 2023-01-02

## 2023-01-02 RX ORDER — METOPROLOL SUCCINATE 25 MG/1
25 TABLET, EXTENDED RELEASE ORAL DAILY
Status: DISCONTINUED | OUTPATIENT
Start: 2023-01-03 | End: 2023-01-04 | Stop reason: HOSPADM

## 2023-01-02 RX ORDER — OXYCODONE HYDROCHLORIDE 5 MG/1
5 TABLET ORAL EVERY 4 HOURS PRN
Status: DISCONTINUED | OUTPATIENT
Start: 2023-01-02 | End: 2023-01-03

## 2023-01-02 RX ORDER — SODIUM CHLORIDE, SODIUM GLUCONATE, SODIUM ACETATE, POTASSIUM CHLORIDE, MAGNESIUM CHLORIDE, SODIUM PHOSPHATE, DIBASIC, AND POTASSIUM PHOSPHATE .53; .5; .37; .037; .03; .012; .00082 G/100ML; G/100ML; G/100ML; G/100ML; G/100ML; G/100ML; G/100ML
125 INJECTION, SOLUTION INTRAVENOUS CONTINUOUS
Status: DISCONTINUED | OUTPATIENT
Start: 2023-01-02 | End: 2023-01-03

## 2023-01-02 RX ORDER — MORPHINE SULFATE 10 MG/ML
6 INJECTION, SOLUTION INTRAMUSCULAR; INTRAVENOUS ONCE
Status: COMPLETED | OUTPATIENT
Start: 2023-01-02 | End: 2023-01-02

## 2023-01-02 RX ADMIN — OXYCODONE HYDROCHLORIDE 5 MG: 5 TABLET ORAL at 22:17

## 2023-01-02 RX ADMIN — MORPHINE SULFATE 6 MG: 10 INJECTION INTRAVENOUS at 12:58

## 2023-01-02 RX ADMIN — LIDOCAINE HYDROCHLORIDE 20 ML: 10 INJECTION, SOLUTION EPIDURAL; INFILTRATION; INTRACAUDAL at 14:29

## 2023-01-02 RX ADMIN — SODIUM CHLORIDE, SODIUM GLUCONATE, SODIUM ACETATE, POTASSIUM CHLORIDE AND MAGNESIUM CHLORIDE 125 ML/HR: 526; 502; 368; 37; 30 INJECTION, SOLUTION INTRAVENOUS at 23:27

## 2023-01-02 RX ADMIN — CEFAZOLIN SODIUM 2000 MG: 2 SOLUTION INTRAVENOUS at 20:36

## 2023-01-02 RX ADMIN — FLECAINIDE ACETATE 50 MG: 50 TABLET ORAL at 21:19

## 2023-01-02 RX ADMIN — TETANUS TOXOID, REDUCED DIPHTHERIA TOXOID AND ACELLULAR PERTUSSIS VACCINE, ADSORBED 0.5 ML: 5; 2.5; 8; 8; 2.5 SUSPENSION INTRAMUSCULAR at 12:59

## 2023-01-02 RX ADMIN — ACETAMINOPHEN 975 MG: 325 TABLET, FILM COATED ORAL at 22:17

## 2023-01-02 RX ADMIN — CEFAZOLIN SODIUM 2000 MG: 2 SOLUTION INTRAVENOUS at 12:59

## 2023-01-02 NOTE — H&P
Orthopedics   Sheela Pollack 74 y o  male MRN: [id number]  Unit/Bed#: X ray      Chief Complaint:   Left hand pain    HPI:  74 y o  right hand dominant male complaining of left hand pain after a crush injury with a hydraulic wood splitter  He reports his hand got caught in the device and caused multiple wounds to his hand  He was wearing industrial gloves which had a large defect after the accident  He denies any left wrist, elbow, or shoulder pain  He denies any numbness or tingling  He denies motor deficits to his left hand  The pain is localized to the dorsal hand and dorsal ring finger at the proximal phalanx  He is a retired  and takes Eliquis for [address]:   · Skin: Normal  · Neuro: See HPI  · Musculoskeletal: See HPI  · 14 point review of systems negative except as stated above     Past Medical History:   Past Medical History:   Diagnosis Date   • Arthritis    • Atrial fibrillation ([facility] [address] )    • Cataract    • Glaucoma     Right eye   • Hypertension        Past Surgical History:   Past Surgical History:   Procedure Laterality Date   • CHEST SURGERY      Unsure of surgery, pt had a trauma and had chest surgery anterior and L posterior   • FEMUR FRACTURE SURGERY Left    • HIP FRACTURE SURGERY Left    • KNEE SURGERY Left        Family History:  Family history reviewed and non-contributory  History reviewed  No pertinent family history      Social History:  Social History     Socioeconomic History   • Marital status: Single     Spouse name: None   • Number of children: None   • Years of education: None   • Highest education level: None   Occupational History   • None   Tobacco Use   • Smoking status: Never   • Smokeless tobacco: Never   Substance and Sexual Activity   • Alcohol use: Yes     Comment: 12 drinks monthly   • Drug use: No   • Sexual activity: None   Other Topics Concern   • None   Social History Narrative   • None     Social Determinants of Health     Financial Resource Strain: Not on file   Food Insecurity: Not on file   Transportation Needs: Not on file   Physical Activity: Not on file   Stress: Not on file   Social Connections: Not on file   Intimate Partner Violence: Not on file   Housing Stability: Not on file       Allergies:    Allergies   Allergen Reactions   • Demerol [Meperidine]      Unknown reaction             Labs:  0   Lab Value Date/Time    HCT 40 7 01/02/2023 1348    HCT 45 2 12/20/2022 0928    HCT 44 3 04/23/2022 0709    HCT 45 1 05/05/2018 0828    HGB 13 0 01/02/2023 1348    HGB 14 8 12/20/2022 0928    HGB 14 6 04/23/2022 0709    HGB 15 4 05/05/2018 0828    INR 1 24 (H) 01/02/2023 1348    INR 1 03 05/05/2018 0828    WBC 9 48 01/02/2023 1348    WBC 6 85 12/20/2022 0928    WBC 5 53 04/23/2022 0709    WBC 5 4 05/05/2018 0828       Meds:    Current Facility-Administered Medications:   •  flecainide (TAMBOCOR) tablet 50 mg, 50 mg, Oral, BID, Jovani Evans MD  •  [START ON 1/3/2023] metoprolol succinate (TOPROL-XL) 24 hr tablet 25 mg, 25 mg, Oral, Daily, Jovani Evans MD  •  multi-electrolyte (PLASMALYTE-A/ISOLYTE-S PH 7 4) IV solution, 125 mL/hr, Intravenous, Continuous, Jovani Evans MD    Current Outpatient Medications:   •  acetaminophen (TYLENOL) 325 mg tablet, Take 650 mg by mouth every 6 (six) hours as needed for mild pain, Disp: , Rfl:   •  apixaban (ELIQUIS) 5 mg, Take 1 tablet (5 mg total) by mouth 2 (two) times a day, Disp: 60 tablet, Rfl: 5  •  flecainide (TAMBOCOR) 50 mg tablet, Take 1 tablet (50 mg total) by mouth 2 (two) times a day, Disp: 60 tablet, Rfl: 5  •  flecainide (TAMBOCOR) 50 mg tablet, Take 1 tablet (50 mg total) by mouth 2 (two) times a day, Disp: , Rfl: 0  •  latanoprost (XALATAN) 0 005 % ophthalmic solution, 1 drop daily at bedtime, Disp: , Rfl:   •  metoprolol succinate (TOPROL-XL) 25 mg 24 hr tablet, Take 1 tablet (25 mg total) by mouth daily, Disp: 60 tablet, Rfl: 5  •  Multiple Vitamins-Minerals (CENTRUM SILVER PO), Take by mouth, Disp: , Rfl:     Blood Culture:   No results found for: BLOODCX    Wound Culture:   No results found for: WOUNDCULT    Ins and Outs:  I/O last 24 hours: In: 48 [IV Piggyback:50]  Out: -           Physical Exam:   /69 (BP Location: Right arm)   Pulse 64   Temp 97 5 °F (36 4 °C) (Oral)   Resp 16   SpO2 96%   Gen: Alert and oriented to person, place, time  HEENT: EOMI, eyes clear, moist mucus membranes, hearing intact  Respiratory: Bilateral chest rise  No audible wheezing found  Cardiovascular: Regular Rate and Rhythm  Abdomen: soft nontender/nondistended  Musculoskeletal: left upper extremity  · Skin with multiple open wounds at the dorsal aspect of the hand at the second and third metacarpals with venous drainage  There is an open wound at the ulnar/dorsal aspect of the proximal phalanx of the long finger and a large open wound spanning the entire dorsal aspect of the proximal phalanx of the ring finger both with venous ooze  The dorsal aspect of the ring finger proximal phalanx has extensor tendon visualized  The extensor tendon is intact  · TTP about dorsal aspect of hand overlying second and third metacarpals  Mild TTP about dorsal aspect of long finger and ring finger proximal phalanaxes  · No open wounds noted at volar aspect of hand  No open wounds at wrist  · Full active & passive ROM at wrist, CMC joint of thumb, IP joint of thumb, MCP joints of all digits, PIP joints of all digits, and DIP joints of all digits  Sensation intact to thumb, index, long, ring, and small fingers at volar and dorsal aspects  · Able to form a composite fist with a mild rotational deformity of the index finger compared to the right hand  This is correctable  · Motor intact at FDP, FDS, FPL, and lumbricals  Able to extend wrist, able to radially and ulnarly deviate at wrist    · 2+ rad pulse    Radiology:   I personally reviewed the films    X rays of left at with PA, Lateral, internal and external oblique views demonstrate transverse second and third metacarpal fractures at the mid shaft with shortening and displacement  There is a comminuted fracture of the proximal phalanx of the long finger  No fracture or dislocation at the ring finger proximal phalanx  Procedure: Bedside washout, closed reduction andSplint application  After adequate analgesia was obtained with local lidocaine at the dorsal hand with a dorsal digital block at the ring and long fingers, an infiltrative block at the dorsal hand overlying the second and third metacarpals, the patient was irrigated with 3L of sterile NS at each wound  The patient's open wounds were sutured with 4-0 Proline and a gentle reduction maneuver was performed  After a reduction maneuver, the pt was [facility]  Pt tolerated the procedure well and was neurovascularly intact pre and post procedure  Post splinting radiographs show improved alignment of the second metacarpal, however, on the oblique films there shows unacceptable displacement      _*_*_*_*_*_*_*_*_*_*_*_*_*_*_*_*_*_*_*_*_*_*_*_*_*_*_*_*_*_*_*_*_*_*_*_*_*_*_*_*_*    Assessment:  74 y o  right hand dominant male status post crush injury with a hydraulic log splitter with open second and third metacarpal fractures, an open proximal phalanx fracture at the long finger, and an open wound at the dorsal aspect of the ring finger proximal phalanx with extensor tendon exposure but with tendon integrity intact  The metacarpal fractures are unstable in nature  Plan:   · NWB left hand in splint   · Due to the unstable and open nature of the metacarpal fractures, the patient is indicated for an operative washout and ORIF of his injuries  He is agreeable to this plan and consent has been obtained with family in the room   This will be performed by Dr WEL[facility]   · NPO at midnight   · Preop labs perform in ED   · Admit to ortho service with medicine consulted for preop risk stratification   · Tetanus updated  · Ancef given and will be standing for open fracture protocol   · Dispo: Ortho will follow     Luis Carlos Bravo MD

## 2023-01-02 NOTE — ED ATTENDING ATTESTATION
1/2/2023  IJaqui MD, saw and evaluated the patient  I have discussed the patient with the resident/non-physician practitioner and agree with the resident's/non-physician practitioner's findings, Plan of Care, and MDM as documented in the resident's/non-physician practitioner's note, except where noted  All available labs and Radiology studies were reviewed  I was present for key portions of any procedure(s) performed by the resident/non-physician practitioner and I was immediately available to provide assistance  At this point I agree with the current assessment done in the Emergency Department    I have conducted an independent evaluation of this patient a history and physical is as follows:  Patient presents for evaluation of crush injury to the left hand he is right-hand dominant he was using a log splitter  Patient has an open wound over the dorsal aspect of his third digit at the metacarpal phalangeal joint he also has an open wound over the dorsal aspect of his proximal carpal area adjacent to the second and third metacarpal   there is deformity noted to the fourth digit sensation is intact as are intact wrist is nontender elbow is nontender  Tetanus will be updated IV Ancef x-rays  Pain control  ED Course         Critical Care Time  Procedures

## 2023-01-02 NOTE — ED PROVIDER NOTES
History  Chief Complaint   Patient presents with   • Hand Injury     Pt crushed L hand in between two metal parts of a log splitter      72 Y O M, right handed, presents for left hand injury  States that about an hour ago, he  Crushed his L hand in between two metal parts of a log splitter  Complains of pain, no numbness, tingling  Last Tdap shot unknown  Prior to Admission Medications   Prescriptions Last Dose Informant Patient Reported? Taking?    AMLODIPINE BENZOATE PO   Yes Yes   Sig: Take 2 5 mg by mouth daily   BENAZEPRIL HCL PO   Yes Yes   Sig: Take 10 mg by mouth daily   Multiple Vitamins-Minerals (CENTRUM SILVER PO)   Yes No   Sig: Take by mouth   acetaminophen (TYLENOL) 325 mg tablet   Yes No   Sig: Take 650 mg by mouth every 6 (six) hours as needed for mild pain   apixaban (ELIQUIS) 5 mg   No No   Sig: Take 1 tablet (5 mg total) by mouth 2 (two) times a day   flecainide (TAMBOCOR) 50 mg tablet   No No   Sig: Take 1 tablet (50 mg total) by mouth 2 (two) times a day   flecainide (TAMBOCOR) 50 mg tablet   No No   Sig: Take 1 tablet (50 mg total) by mouth 2 (two) times a day   latanoprost (XALATAN) 0 005 % ophthalmic solution   Yes No   Si drop daily at bedtime   metoprolol succinate (TOPROL-XL) 25 mg 24 hr tablet   No No   Sig: Take 1 tablet (25 mg total) by mouth daily   pravastatin (PRAVACHOL) 20 mg tablet   Yes Yes   Sig: Take 20 mg by mouth daily      Facility-Administered Medications: None       Past Medical History:   Diagnosis Date   • Arthritis    • Atrial fibrillation (HCC)    • Cataract    • Glaucoma     Right eye   • Hypertension        Past Surgical History:   Procedure Laterality Date   • CAST APPLICATION Left 4528    Procedure: Application short arm splint;  Surgeon: Faby Toribio MD;  Location: [facility];  Service: Orthopedics   • CHEST SURGERY      Unsure of surgery, pt had a trauma and had chest surgery anterior and L posterior   • FEMUR FRACTURE SURGERY Left    • HAND DEBRIDEMENT Left 1/3/2023    Procedure: Irrigation and debridement of the left hand open index and long finger metacarpal fractures  (Skin, subcutaneous tissue, muscle, fascia, and bone)  Irrigation and debridement skin, subcutaneous tissue, fascia and tendon left hand ring finger proximal phalanx wound ;  Surgeon: Tracie Bartlett MD;  Location: [facility];  Service: Orthopedics   • HAND FRACTURE REPAIR Left 1/3/2023    Procedure: Open reduction and internal fixation left hand index and long finger metacarpal fractures  Open reduction and internal fixation left hand long finger proximal phalanx fracture;  Surgeon: Tracie Bartlett MD;  Location: [facility];  Service: Orthopedics   • HIP FRACTURE SURGERY Left    • KNEE SURGERY Left        History reviewed  No pertinent family history  I have reviewed and agree with the history as documented  E-Cigarette/Vaping     E-Cigarette/Vaping Substances     Social History     Tobacco Use   • Smoking status: Never   • Smokeless tobacco: Never   Substance Use Topics   • Alcohol use: Yes     Comment: 12 drinks monthly   • Drug use: No        Review of Systems   Constitutional: Negative for chills and fever  HENT: Negative for ear pain and sore throat  Eyes: Negative for pain and visual disturbance  Respiratory: Negative for cough and shortness of breath  Cardiovascular: Negative for chest pain and palpitations  Gastrointestinal: Negative for abdominal pain and vomiting  Genitourinary: Negative for dysuria and hematuria  Musculoskeletal: Negative for arthralgias and back pain  Skin: Negative for color change and rash  Neurological: Negative for seizures and syncope  All other systems reviewed and are negative        Physical Exam  ED Triage Vitals   Temperature Pulse Respirations Blood Pressure SpO2   01/02/23 1235 01/02/23 1235 01/02/23 1235 01/02/23 1235 01/02/23 1235   97 5 °F (36 4 °C) 65 20 151/77 98 %      Temp Source Heart Rate Source Patient Position - Orthostatic VS BP Location FiO2 (%)   01/02/23 1235 01/02/23 1235 01/02/23 1235 01/02/23 1235 --   Oral Monitor Lying Right arm       Pain Score       01/02/23 1258       10 - Worst Possible Pain             Orthostatic Vital Signs  Vitals:    01/03/23 1401 01/03/23 1554 01/03/23 2319 01/04/23 0717   BP: (!) 171/81 119/66 132/68 126/67   Pulse: 58 55  69   Patient Position - Orthostatic VS:  Lying         Physical Exam  Vitals and nursing note reviewed  Constitutional:       General: He is not in acute distress  Appearance: He is well-developed  He is not ill-appearing or toxic-appearing  HENT:      Head: Normocephalic and atraumatic  Eyes:      Conjunctiva/sclera: Conjunctivae normal    Cardiovascular:      Rate and Rhythm: Normal rate and regular rhythm  Heart sounds: Normal heart sounds  No murmur heard  Pulmonary:      Effort: Pulmonary effort is normal  No respiratory distress  Breath sounds: Normal breath sounds  No wheezing, rhonchi or rales  Abdominal:      General: There is no distension  Palpations: Abdomen is soft  Tenderness: There is no abdominal tenderness  There is no guarding or rebound  Musculoskeletal:         General: No swelling  Cervical back: Neck supple  Comments: Left hand swollen, open wounds on 4th digit, 2nd and 3rd metacarpals with exposed bone  Neurovascularly intact  Skin:     General: Skin is warm and dry  Capillary Refill: Capillary refill takes less than 2 seconds  Neurological:      General: No focal deficit present  Mental Status: He is alert and oriented to person, place, and time     Psychiatric:         Mood and Affect: Mood normal          Behavior: Behavior normal          ED Medications  Medications   tetanus-diphtheria-acellular pertussis (BOOSTRIX) IM injection 0 5 mL (0 5 mL Intramuscular Given 1/2/23 1259)   ceFAZolin (ANCEF) IVPB (premix in dextrose) 2,000 mg 50 mL (0 mg Intravenous Stopped 1/2/23 1348)   morphine injection 6 mg (6 mg Intravenous Given 1/2/23 1258)   lidocaine (PF) (XYLOCAINE-MPF) 1 % injection 20 mL (20 mL Infiltration Given by Other 1/2/23 1429)       Diagnostic Studies  Results Reviewed     Procedure Component Value Units Date/Time    CBC (With Platelets) [[id number]]  (Normal) Collected: 01/03/23 0630    Lab Status: Final result Specimen: Blood from Arm, Right Updated: 01/03/23 0759     WBC 6 72 Thousand/uL      RBC 4 13 Million/uL      Hemoglobin 12 7 g/dL      Hematocrit 39 5 %      MCV 96 fL      MCH 30 8 pg      MCHC 32 2 g/dL      RDW 12 4 %      Platelets 564 Thousands/uL      MPV 10 4 fL     Basic metabolic panel [[id number]] Collected: 01/03/23 0630    Lab Status: Final result Specimen: Blood from Arm, Right Updated: 01/03/23 0709     Sodium 140 mmol/L      Potassium 4 6 mmol/L      Chloride 106 mmol/L      CO2 30 mmol/L      ANION GAP 4 mmol/L      BUN 21 mg/dL      Creatinine 1 12 mg/dL      Glucose 106 mg/dL      Calcium 8 5 mg/dL      eGFR 64 ml/min/1 73sq m     Narrative:      [place] guidelines for Chronic Kidney Disease (CKD):   •  Stage 1 with normal or high GFR (GFR > 90 mL/min/1 73 square meters)  •  Stage 2 Mild CKD (GFR = 60-89 mL/min/1 73 square meters)  •  Stage 3A Moderate CKD (GFR = 45-59 mL/min/1 73 square meters)  •  Stage 3B Moderate CKD (GFR = 30-44 mL/min/1 73 square meters)  •  Stage 4 Severe CKD (GFR = 15-29 mL/min/1 73 square meters)  •  Stage 5 End Stage CKD (GFR <15 mL/min/1 73 square meters)  Note: GFR calculation is accurate only with a steady state creatinine    Protime-INR [[id number]]  (Abnormal) Collected: 01/02/23 1348    Lab Status: Final result Specimen: Blood from Arm, Right Updated: 01/02/23 1413     Protime 15 8 seconds      INR 1 24    APTT [[id number]]  (Normal) Collected: 01/02/23 1348    Lab Status: Final result Specimen: Blood from Arm, Right Updated: 01/02/23 1413     PTT 31 seconds     Basic metabolic panel [[id number]]  (Abnormal) Collected: 01/02/23 1348    Lab Status: Final result Specimen: Blood from Arm, Right Updated: 01/02/23 1413     Sodium 137 mmol/L      Potassium 5 0 mmol/L      Chloride 106 mmol/L      CO2 26 mmol/L      ANION GAP 5 mmol/L      BUN 22 mg/dL      Creatinine 1 63 mg/dL      Glucose 151 mg/dL      Calcium 8 5 mg/dL      eGFR 40 ml/min/1 73sq m     Narrative:      [place] guidelines for Chronic Kidney Disease (CKD):   •  Stage 1 with normal or high GFR (GFR > 90 mL/min/1 73 square meters)  •  Stage 2 Mild CKD (GFR = 60-89 mL/min/1 73 square meters)  •  Stage 3A Moderate CKD (GFR = 45-59 mL/min/1 73 square meters)  •  Stage 3B Moderate CKD (GFR = 30-44 mL/min/1 73 square meters)  •  Stage 4 Severe CKD (GFR = 15-29 mL/min/1 73 square meters)  •  Stage 5 End Stage CKD (GFR <15 mL/min/1 73 square meters)  Note: GFR calculation is accurate only with a steady state creatinine    CBC and differential [[id number]]  (Abnormal) Collected: 01/02/23 1348    Lab Status: Final result Specimen: Blood from Arm, Right Updated: 01/02/23 1358     WBC 9 48 Thousand/uL      RBC 4 33 Million/uL      Hemoglobin 13 0 g/dL      Hematocrit 40 7 %      MCV 94 fL      MCH 30 0 pg      MCHC 31 9 g/dL      RDW 12 0 %      MPV 9 9 fL      Platelets 395 Thousands/uL      nRBC 0 /100 WBCs      Neutrophils Relative 76 %      Immat GRANS % 0 %      Lymphocytes Relative 16 %      Monocytes Relative 6 %      Eosinophils Relative 1 %      Basophils Relative 1 %      Neutrophils Absolute 7 17 Thousands/µL      Immature Grans Absolute 0 03 Thousand/uL      Lymphocytes Absolute 1 52 Thousands/µL      Monocytes Absolute 0 55 Thousand/µL      Eosinophils Absolute 0 13 Thousand/µL      Basophils Absolute 0 08 Thousands/µL                  XR hand 3+ vw left   Final Result by Wero Ayala MD (01/03 9745)      Fluoroscopic guidance provided for procedure guidance    Please refer to the separate procedure notes for additional details  Workstation performed: [id number]         XR chest portable   Final Result by Praveen Teran MD (18/62 8542)      Elevation of the left hemidiaphragm with subsegmental atelectasis in the left lung base  Workstation performed: [id number]         XR follow up   Final Result by Goldy Pak DO (01/03 5388)      1  Mildly displaced fractures 2nd and 3rd metacarpal shafts as above  2  Comminuted, nondisplaced fracture of the 3rd proximal phalanx  3  Tiny chip fracture distal medial hamate is obscured  4  2 mm radiopaque density projecting dorsal to the 1st metacarpal again seen  Workstation performed: [id number]         XR follow up   Final Result by Goldy Pak DO (01/03 2799)      1  Mildly displaced fractures 2nd and 3rd metacarpal shafts as above  2  Comminuted, nondisplaced fracture of the 3rd proximal phalanx  3  Tiny chip fracture distal medial hamate is obscured  4  2 mm radiopaque density projecting dorsal to 1st metacarpal, unchanged  Workstation performed: [id number]         XR follow up   Final Result by Goldy Pak DO (01/03 3838)      1  Mildly displaced fractures 2nd and 3rd metacarpal shafts as above  2  Comminuted, nondisplaced fracture of the 3rd proximal phalanx  3  Tiny chip fracture distal medial hamate is obscured  4  2 mm radiopaque density projecting dorsal to the 1st metacarpal again seen  Workstation performed: [id number]         XR follow up   Final Result by Goldy Pak DO (01/03 1819)      1  Mildly displaced fracture 3rd metacarpal shaft, unchanged  2  Second metacarpal shaft fracture appears near anatomic alignment on this single view  3  Comminuted, nondisplaced fracture of the 3rd proximal phalanx with adjacent soft tissue gas suggesting open fracture        4  Tiny chip fracture seen along the distal medial hamate  Workstation performed: [id number]         XR hand 3+ vw left   Final Result by Aurelio Hanks DO (01/03 0830)      1  Mildly displaced fractures 2nd and 3rd metacarpal shafts, unchanged  2  Comminuted, nondisplaced fracture of the 3rd proximal phalanx with adjacent soft tissue gas suggesting open fracture  3  Tiny chip fracture seen along the distal medial hamate  4  Soft tissue swelling as above with 2 mm radiopaque density dorsal to the 1st metacarpal, possibly small foreign body  Workstation performed: [id number]         XR hand 3+ views LEFT   Final Result by Yanni Betancourt MD (01/02 1914)      Fractures index and middle finger metacarpals and proximal phalanx of middle finger as described  Small foreign body likely in soft tissues adjacent to the metacarpal of the thumb  Workstation performed: [id number]         XR wrist 3+ views LEFT   Final Result by Yanni Betancourt MD (01/02 1916)      Multiple hand fractures reported separately with dedicated hand study and small foreign body in soft tissues of thumb  No additional wrist fractures  Workstation performed: [id number]               Procedures  Procedures      ED Course  ED Course as of 01/05/23 2050 Mon Jan 02, 2023   1350 Consulted ortho/hand surgery   1628 74 y o here for crush injury of left hand  Multiple fractures in the left hand, ortho consulted  Further care per ortho               Identification of Seniors at [facility] Most Recent Value   (ISAR) Identification of Seniors at Risk    Before the illness or injury that brought you to the Emergency, did you need someone to help you on a regular basis? 0 Filed at: 01/02/2023 1238   In the last 24 hours, have you needed more help than usual? 0 Filed at: 01/02/2023 1238   Have you been hospitalized for one or more nights during the past 6 months?  0 Filed at: 01/02/2023 1238   In general, do you see well? 0 Filed at: 01/02/2023 1238   In general, do you have serious problems with your memory? 0 Filed at: 01/02/2023 1238   Do you take more than three different medications every day? 1 Filed at: 01/02/2023 1238   ISAR Score 1 Filed at: 01/02/2023 1238                              Medical Decision Making  74 Y O M, right handed, presents for left hand injury  Xrays, Tdap, Ancef and symptomatic treatment in the ED  Xray:   "Fractures index and middle finger metacarpals and proximal phalanx of middle finger as described  Small foreign body likely in soft tissues adjacent to the metacarpal of the thumb"  Ortho was consulted and pt  Was admitted for further surgical intervention  Fractured hand: acute illness or injury  Amount and/or Complexity of Data Reviewed  Labs: ordered  Radiology: ordered  Risk  Prescription drug management  Decision regarding hospitalization              Disposition  Final diagnoses:   Fractured hand     Time reflects when diagnosis was documented in both MDM as applicable and the Disposition within this note     Time User Action Codes Description Comment    1/2/2023  1:40 PM Benson Cox Add [S62 90XA] Fractured hand     1/2/2023  5:34 PM Elliot Vegas Add [S62 321B] Open displaced fracture of shaft of second metacarpal bone of left hand, initial encounter     1/2/2023  5:34 PM William Vegas Add [S62 323B] Open displaced fracture of shaft of third metacarpal bone of left hand, initial encounter     1/2/2023  5:36 PM Lamar López Add [S62 643B] Open nondisplaced fracture of proximal phalanx of left middle finger, initial encounter     1/2/2023  5:38 PM Lamar López Add [I48 0] Paroxysmal atrial fibrillation [facility])       ED Disposition     ED Disposition   Admit    Condition   Stable    Date/Time   Mon Jan 2, 2023  5:27 PM    Comment   Case was discussed with ortho and the patient's admission status was agreed to be Admission Status: inpatient status to the service of Dr Seleta Habermann                Follow-up Information     Follow up With Specialties Details Why Contact Info    Sheila Patricia MD Orthopedic Surgery, Hand Surgery Follow up in 1 week(s)  [address]  [phone]            Discharge Medication List as of 1/4/2023 11:45 AM      START taking these medications    Details   amoxicillin-clavulanate (AUGMENTIN) 875-125 mg per tablet Take 1 tablet by mouth every 12 (twelve) hours for 7 days, Starting Wed 1/4/2023, Until Wed 1/11/2023, Normal      HYDROcodone-acetaminophen (Norco) 5-325 mg per tablet Take 1 tablet by mouth every 6 (six) hours as needed for pain for up to 10 days Max Daily Amount: 4 tablets, Starting Wed 1/4/2023, Until Sat 1/14/2023 at 2359, Normal         CONTINUE these medications which have NOT CHANGED    Details   AMLODIPINE BENZOATE PO Take 2 5 mg by mouth daily, Historical Med      BENAZEPRIL HCL PO Take 10 mg by mouth daily, Historical Med      pravastatin (PRAVACHOL) 20 mg tablet Take 20 mg by mouth daily, Historical Med      acetaminophen (TYLENOL) 325 mg tablet Take 650 mg by mouth every 6 (six) hours as needed for mild pain, Historical Med      apixaban (ELIQUIS) 5 mg Take 1 tablet (5 mg total) by mouth 2 (two) times a day, Starting Fri 6/8/2018, Normal      flecainide (TAMBOCOR) 50 mg tablet Take 1 tablet (50 mg total) by mouth 2 (two) times a day, Starting Fri 6/15/2018, Normal      latanoprost (XALATAN) 0 005 % ophthalmic solution 1 drop daily at bedtime, Historical Med      metoprolol succinate (TOPROL-XL) 25 mg 24 hr tablet Take 1 tablet (25 mg total) by mouth daily, Starting Fri 6/8/2018, Normal      Multiple Vitamins-Minerals (CENTRUM SILVER PO) Take by mouth, Historical Med           Outpatient Discharge Orders   Discharge Diet     Activity as tolerated     No strenuous exercise     Weight Bearing Restrictions     No driving until     Leave dressing on - Keep it clean, dry, and intact until clinic visit     Call provider for:  persistent nausea or vomiting     Call provider for:  severe uncontrolled pain     Call provider for:  redness, tenderness, or signs of infection (pain, swelling, redness, odor or green/yellow discharge around incision site)       PDMP Review     None           ED Provider  Attending physically available and evaluated Anyi Morse I managed the patient along with the ED Attending      Electronically Signed by         Enedina Grimaldo DO  01/05/23 2051

## 2023-01-02 NOTE — ED NOTES
Nephew and nephew's wife at bedside  Patient's jacket, flannel shirt and t shirt cut off  Nephew checked pockets of the shirts  Clothing was then discarded       Joshua George  01/02/23 9231

## 2023-01-03 ENCOUNTER — APPOINTMENT (INPATIENT)
Dept: RADIOLOGY | Facility: HOSPITAL | Age: 73
End: 2023-01-03

## 2023-01-03 ENCOUNTER — ANESTHESIA (INPATIENT)
Dept: PERIOP | Facility: HOSPITAL | Age: 73
End: 2023-01-03

## 2023-01-03 PROBLEM — K59.00 CONSTIPATION: Status: ACTIVE | Noted: 2023-01-03

## 2023-01-03 PROBLEM — S67.20XA CRUSH INJURY OF HAND: Status: ACTIVE | Noted: 2023-01-03

## 2023-01-03 PROBLEM — I10 HYPERTENSION: Status: ACTIVE | Noted: 2023-01-03

## 2023-01-03 PROBLEM — Z01.818 PREOPERATIVE CLEARANCE: Status: ACTIVE | Noted: 2023-01-03

## 2023-01-03 PROBLEM — E78.5 HYPERLIPIDEMIA: Status: ACTIVE | Noted: 2023-01-03

## 2023-01-03 PROBLEM — T14.8XXA CRUSH INJURY: Status: ACTIVE | Noted: 2023-01-03

## 2023-01-03 PROBLEM — N17.9 AKI (ACUTE KIDNEY INJURY) (HCC): Status: ACTIVE | Noted: 2023-01-03

## 2023-01-03 LAB
ANION GAP SERPL CALCULATED.3IONS-SCNC: 4 MMOL/L (ref 4–13)
BUN SERPL-MCNC: 21 MG/DL (ref 5–25)
CALCIUM SERPL-MCNC: 8.5 MG/DL (ref 8.3–10.1)
CHLORIDE SERPL-SCNC: 106 MMOL/L (ref 96–108)
CO2 SERPL-SCNC: 30 MMOL/L (ref 21–32)
CREAT SERPL-MCNC: 1.12 MG/DL (ref 0.6–1.3)
ERYTHROCYTE [DISTWIDTH] IN BLOOD BY AUTOMATED COUNT: 12.4 % (ref 11.6–15.1)
GFR SERPL CREATININE-BSD FRML MDRD: 64 ML/MIN/1.73SQ M
GLUCOSE SERPL-MCNC: 106 MG/DL (ref 65–140)
HCT VFR BLD AUTO: 39.5 % (ref 36.5–49.3)
HGB BLD-MCNC: 12.7 G/DL (ref 12–17)
MCH RBC QN AUTO: 30.8 PG (ref 26.8–34.3)
MCHC RBC AUTO-ENTMCNC: 32.2 G/DL (ref 31.4–37.4)
MCV RBC AUTO: 96 FL (ref 82–98)
PLATELET # BLD AUTO: 217 THOUSANDS/UL (ref 149–390)
PMV BLD AUTO: 10.4 FL (ref 8.9–12.7)
POTASSIUM SERPL-SCNC: 4.6 MMOL/L (ref 3.5–5.3)
RBC # BLD AUTO: 4.13 MILLION/UL (ref 3.88–5.62)
SODIUM SERPL-SCNC: 140 MMOL/L (ref 135–147)
WBC # BLD AUTO: 6.72 THOUSAND/UL (ref 4.31–10.16)

## 2023-01-03 PROCEDURE — 0PSV04Z REPOSITION LEFT FINGER PHALANX WITH INTERNAL FIXATION DEVICE, OPEN APPROACH: ICD-10-PCS | Performed by: ORTHOPAEDIC SURGERY

## 2023-01-03 PROCEDURE — 0PSQ04Z REPOSITION LEFT METACARPAL WITH INTERNAL FIXATION DEVICE, OPEN APPROACH: ICD-10-PCS | Performed by: ORTHOPAEDIC SURGERY

## 2023-01-03 PROCEDURE — 3E0T3BZ INTRODUCTION OF ANESTHETIC AGENT INTO PERIPHERAL NERVES AND PLEXI, PERCUTANEOUS APPROACH: ICD-10-PCS | Performed by: ANESTHESIOLOGY

## 2023-01-03 DEVICE — INNATE IS A STAINLESS STEEL BONE SCREW
Type: IMPLANTABLE DEVICE | Site: HAND | Status: FUNCTIONAL
Brand: INNATE IMPLANT

## 2023-01-03 DEVICE — IMPLANTABLE DEVICE: Type: IMPLANTABLE DEVICE | Site: HAND | Status: FUNCTIONAL

## 2023-01-03 RX ORDER — POLYETHYLENE GLYCOL 3350 17 G/17G
17 POWDER, FOR SOLUTION ORAL DAILY
Status: DISCONTINUED | OUTPATIENT
Start: 2023-01-04 | End: 2023-01-04 | Stop reason: HOSPADM

## 2023-01-03 RX ORDER — LISINOPRIL 10 MG/1
10 TABLET ORAL DAILY
Status: DISCONTINUED | OUTPATIENT
Start: 2023-01-04 | End: 2023-01-04 | Stop reason: HOSPADM

## 2023-01-03 RX ORDER — BUPIVACAINE HYDROCHLORIDE 5 MG/ML
INJECTION, SOLUTION PERINEURAL
Status: COMPLETED | OUTPATIENT
Start: 2023-01-03 | End: 2023-01-03

## 2023-01-03 RX ORDER — AMOXICILLIN 250 MG
1 CAPSULE ORAL 2 TIMES DAILY
Status: DISCONTINUED | OUTPATIENT
Start: 2023-01-03 | End: 2023-01-04 | Stop reason: HOSPADM

## 2023-01-03 RX ORDER — PROPOFOL 10 MG/ML
INJECTION, EMULSION INTRAVENOUS AS NEEDED
Status: DISCONTINUED | OUTPATIENT
Start: 2023-01-03 | End: 2023-01-03

## 2023-01-03 RX ORDER — ACETAMINOPHEN 325 MG/1
650 TABLET ORAL EVERY 6 HOURS PRN
Status: DISCONTINUED | OUTPATIENT
Start: 2023-01-03 | End: 2023-01-03

## 2023-01-03 RX ORDER — PRAVASTATIN SODIUM 20 MG
20 TABLET ORAL
Status: DISCONTINUED | OUTPATIENT
Start: 2023-01-03 | End: 2023-01-04 | Stop reason: HOSPADM

## 2023-01-03 RX ORDER — HYDROCODONE BITARTRATE AND ACETAMINOPHEN 5; 325 MG/1; MG/1
1 TABLET ORAL EVERY 6 HOURS PRN
Status: DISCONTINUED | OUTPATIENT
Start: 2023-01-03 | End: 2023-01-04 | Stop reason: HOSPADM

## 2023-01-03 RX ORDER — LIDOCAINE HYDROCHLORIDE 10 MG/ML
INJECTION, SOLUTION EPIDURAL; INFILTRATION; INTRACAUDAL; PERINEURAL
Status: COMPLETED | OUTPATIENT
Start: 2023-01-03 | End: 2023-01-03

## 2023-01-03 RX ORDER — PRAVASTATIN SODIUM 20 MG
20 TABLET ORAL DAILY
COMMUNITY

## 2023-01-03 RX ORDER — MAGNESIUM HYDROXIDE 1200 MG/15ML
LIQUID ORAL AS NEEDED
Status: DISCONTINUED | OUTPATIENT
Start: 2023-01-03 | End: 2023-01-03 | Stop reason: HOSPADM

## 2023-01-03 RX ORDER — ACETAMINOPHEN 325 MG/1
650 TABLET ORAL EVERY 6 HOURS PRN
Status: DISCONTINUED | OUTPATIENT
Start: 2023-01-03 | End: 2023-01-04

## 2023-01-03 RX ORDER — FENTANYL CITRATE/PF 50 MCG/ML
25 SYRINGE (ML) INJECTION
Status: DISCONTINUED | OUTPATIENT
Start: 2023-01-03 | End: 2023-01-03 | Stop reason: HOSPADM

## 2023-01-03 RX ORDER — ONDANSETRON 2 MG/ML
4 INJECTION INTRAMUSCULAR; INTRAVENOUS ONCE AS NEEDED
Status: DISCONTINUED | OUTPATIENT
Start: 2023-01-03 | End: 2023-01-03 | Stop reason: HOSPADM

## 2023-01-03 RX ORDER — EPHEDRINE SULFATE 50 MG/ML
INJECTION INTRAVENOUS AS NEEDED
Status: DISCONTINUED | OUTPATIENT
Start: 2023-01-03 | End: 2023-01-03

## 2023-01-03 RX ORDER — BENAZEPRIL HYDROCHLORIDE 10 MG/1
10 TABLET ORAL DAILY
Status: DISCONTINUED | OUTPATIENT
Start: 2023-01-04 | End: 2023-01-03 | Stop reason: CLARIF

## 2023-01-03 RX ORDER — ONDANSETRON 2 MG/ML
4 INJECTION INTRAMUSCULAR; INTRAVENOUS EVERY 6 HOURS PRN
Status: DISCONTINUED | OUTPATIENT
Start: 2023-01-03 | End: 2023-01-04 | Stop reason: HOSPADM

## 2023-01-03 RX ORDER — FENTANYL CITRATE 50 UG/ML
INJECTION, SOLUTION INTRAMUSCULAR; INTRAVENOUS AS NEEDED
Status: DISCONTINUED | OUTPATIENT
Start: 2023-01-03 | End: 2023-01-03

## 2023-01-03 RX ORDER — HYDROMORPHONE HCL IN WATER/PF 6 MG/30 ML
0.2 PATIENT CONTROLLED ANALGESIA SYRINGE INTRAVENOUS
Status: DISCONTINUED | OUTPATIENT
Start: 2023-01-03 | End: 2023-01-03 | Stop reason: HOSPADM

## 2023-01-03 RX ORDER — AMLODIPINE BESYLATE 2.5 MG/1
2.5 TABLET ORAL DAILY
Status: DISCONTINUED | OUTPATIENT
Start: 2023-01-04 | End: 2023-01-04 | Stop reason: HOSPADM

## 2023-01-03 RX ORDER — PROPOFOL 10 MG/ML
INJECTION, EMULSION INTRAVENOUS CONTINUOUS PRN
Status: DISCONTINUED | OUTPATIENT
Start: 2023-01-03 | End: 2023-01-03

## 2023-01-03 RX ORDER — NAPROXEN 250 MG/1
250 TABLET ORAL 2 TIMES DAILY WITH MEALS
Status: DISCONTINUED | OUTPATIENT
Start: 2023-01-03 | End: 2023-01-04

## 2023-01-03 RX ORDER — TRAMADOL HYDROCHLORIDE 50 MG/1
50 TABLET ORAL EVERY 6 HOURS PRN
Status: DISCONTINUED | OUTPATIENT
Start: 2023-01-03 | End: 2023-01-03

## 2023-01-03 RX ORDER — BENAZEPRIL HYDROCHLORIDE 10 MG/1
10 TABLET ORAL DAILY
Status: DISCONTINUED | OUTPATIENT
Start: 2023-01-03 | End: 2023-01-03

## 2023-01-03 RX ADMIN — EPHEDRINE SULFATE 10 MG: 50 INJECTION INTRAVENOUS at 13:01

## 2023-01-03 RX ADMIN — LIDOCAINE HYDROCHLORIDE 5 ML: 10 INJECTION, SOLUTION EPIDURAL; INFILTRATION; INTRACAUDAL; PERINEURAL at 10:54

## 2023-01-03 RX ADMIN — SODIUM CHLORIDE, SODIUM GLUCONATE, SODIUM ACETATE, POTASSIUM CHLORIDE AND MAGNESIUM CHLORIDE 125 ML/HR: 526; 502; 368; 37; 30 INJECTION, SOLUTION INTRAVENOUS at 05:07

## 2023-01-03 RX ADMIN — CEFAZOLIN SODIUM 2000 MG: 2 SOLUTION INTRAVENOUS at 20:21

## 2023-01-03 RX ADMIN — HYDROCODONE BITARTRATE AND ACETAMINOPHEN 1 TABLET: 5; 325 TABLET ORAL at 23:05

## 2023-01-03 RX ADMIN — FENTANYL CITRATE 50 MCG: 50 INJECTION INTRAMUSCULAR; INTRAVENOUS at 11:31

## 2023-01-03 RX ADMIN — METOPROLOL SUCCINATE 25 MG: 25 TABLET, FILM COATED, EXTENDED RELEASE ORAL at 08:03

## 2023-01-03 RX ADMIN — PROPOFOL 20 MG: 10 INJECTION, EMULSION INTRAVENOUS at 11:25

## 2023-01-03 RX ADMIN — EPHEDRINE SULFATE 5 MG: 50 INJECTION INTRAVENOUS at 12:48

## 2023-01-03 RX ADMIN — OXYCODONE HYDROCHLORIDE 5 MG: 5 TABLET ORAL at 08:03

## 2023-01-03 RX ADMIN — CEFAZOLIN SODIUM 2000 MG: 2 SOLUTION INTRAVENOUS at 11:31

## 2023-01-03 RX ADMIN — FENTANYL CITRATE 50 MCG: 50 INJECTION INTRAMUSCULAR; INTRAVENOUS at 10:53

## 2023-01-03 RX ADMIN — EPHEDRINE SULFATE 5 MG: 50 INJECTION INTRAVENOUS at 11:46

## 2023-01-03 RX ADMIN — BUPIVACAINE HYDROCHLORIDE 20 ML: 5 INJECTION, SOLUTION PERINEURAL at 10:54

## 2023-01-03 RX ADMIN — NAPROXEN 250 MG: 250 TABLET ORAL at 17:52

## 2023-01-03 RX ADMIN — PROPOFOL 80 MCG/KG/MIN: 10 INJECTION, EMULSION INTRAVENOUS at 11:25

## 2023-01-03 RX ADMIN — ACETAMINOPHEN 975 MG: 325 TABLET, FILM COATED ORAL at 15:09

## 2023-01-03 RX ADMIN — SENNOSIDES AND DOCUSATE SODIUM 1 TABLET: 8.6; 5 TABLET ORAL at 17:09

## 2023-01-03 RX ADMIN — FLECAINIDE ACETATE 50 MG: 50 TABLET ORAL at 08:03

## 2023-01-03 RX ADMIN — FLECAINIDE ACETATE 50 MG: 50 TABLET ORAL at 20:21

## 2023-01-03 RX ADMIN — ACETAMINOPHEN 975 MG: 325 TABLET, FILM COATED ORAL at 05:08

## 2023-01-03 RX ADMIN — HYDROCODONE BITARTRATE AND ACETAMINOPHEN 1 TABLET: 5; 325 TABLET ORAL at 17:09

## 2023-01-03 RX ADMIN — PHENYLEPHRINE HYDROCHLORIDE 40 MCG/MIN: 10 INJECTION INTRAVENOUS at 11:56

## 2023-01-03 RX ADMIN — CEFAZOLIN SODIUM 2000 MG: 2 SOLUTION INTRAVENOUS at 05:07

## 2023-01-03 RX ADMIN — SODIUM CHLORIDE, SODIUM GLUCONATE, SODIUM ACETATE, POTASSIUM CHLORIDE AND MAGNESIUM CHLORIDE 125 ML/HR: 526; 502; 368; 37; 30 INJECTION, SOLUTION INTRAVENOUS at 13:48

## 2023-01-03 RX ADMIN — PRAVASTATIN SODIUM 20 MG: 20 TABLET ORAL at 17:09

## 2023-01-03 NOTE — ASSESSMENT & PLAN NOTE
· Present on admission    Resolved with IV fluids    Results from last 7 days   Lab Units 01/03/23  0630 01/02/23  1348   BUN mg/dL 21 22   CREATININE mg/dL 1 12 1 63*   EGFR ml/min/1 73sq m 64 40

## 2023-01-03 NOTE — ASSESSMENT & PLAN NOTE
· Patient reported that he is on benazepril and amlodipine as outpatient  · Reviewing the outpatient records it appears that the patient is on 2 5 mg of Norvasc and 10 mg of benazepril  · We will start on amlodipine  · Hold benazepril since the patient is going to OR tomorrow

## 2023-01-03 NOTE — PROGRESS NOTES
Progress Note - Orthopedics   Alyx Days 74 y o  male MRN: [id number]  Unit/Bed#: [facility] 565-01      Subjective:    72 y o male  No acute events, no complaints  Pt doing well  Pain controlled  Denies fevers chills, CP, SOB  Reports his pain is well controlled this morning and he is ready for surgery   He has had no food since midnight     Labs:  0   Lab Value Date/Time    HCT 39 5 01/03/2023 0630    HCT 40 7 01/02/2023 1348    HCT 45 2 12/20/2022 0928    HCT 45 1 05/05/2018 0828    HGB 12 7 01/03/2023 0630    HGB 13 0 01/02/2023 1348    HGB 14 8 12/20/2022 0928    HGB 15 4 05/05/2018 0828    INR 1 24 (H) 01/02/2023 1348    INR 1 03 05/05/2018 0828    WBC 6 72 01/03/2023 0630    WBC 9 48 01/02/2023 1348    WBC 6 85 12/20/2022 0928    WBC 5 4 05/05/2018 0828       Meds:    Current Facility-Administered Medications:   •  acetaminophen (TYLENOL) tablet 975 mg, 975 mg, Oral, Q8H [address], Tiffany West MD, 975 mg at 01/03/23 2695  •  [START ON 1/4/2023] amLODIPine (NORVASC) tablet 2 5 mg, 2 5 mg, Oral, Daily, Antonieta Pavon MD  •  ceFAZolin (ANCEF) IVPB (premix in dextrose) 2,000 mg 50 mL, 2,000 mg, Intravenous, Q8H, Tiffany West MD, Last Rate: 100 mL/hr at 01/03/23 0507, 2,000 mg at 01/03/23 0507  •  flecainide (TAMBOCOR) tablet 50 mg, 50 mg, Oral, BID, Tiffany West MD, 50 mg at 01/03/23 5614  •  metoprolol succinate (TOPROL-XL) 24 hr tablet 25 mg, 25 mg, Oral, Daily, Tiffany West MD, 25 mg at 01/03/23 6579  •  morphine injection 2 mg, 2 mg, Intravenous, Q4H PRN, Tiffany West MD  •  multi-electrolyte (PLASMALYTE-A/ISOLYTE-S PH 7 4) IV solution, 125 mL/hr, Intravenous, Continuous, Tiffany West MD, Last Rate: 125 mL/hr at 01/03/23 0507, 125 mL/hr at 01/03/23 0507  •  oxyCODONE (ROXICODONE) IR tablet 5 mg, 5 mg, Oral, Q4H PRN, Tiffany West MD, 5 mg at 01/03/23 7992  •  pravastatin (PRAVACHOL) tablet 20 mg, 20 mg, Oral, Daily With Jason Andrade MD    Blood Culture:   No results found for: BLOODCX    Wound Culture:   No results found for: WOUNDCULT    Ins and Outs:  I/O last 24 hours: In: 758 3 [I V :708 3; IV Piggyback:50]  Out: -           Physical:  Vitals:    01/03/23 0718   BP: 142/74   Pulse: 56   Resp:    Temp: 97 8 °F (36 6 °C)   SpO2: 97%     Musculoskeletal: left Upper Extremity  · Skin intact around dressing   · Dressing clean dry intact in the form of an intrinsic plus clamshell splint  · No TTP about hand when palpated through splint   · Unable to test motor function of hand due to clamshell splint  · Sensation intact at all distal phalanxes of digits  · Distal phalanxes of all digits warm and well perfused         Assessment:    72 y o male status post crush injury with open index and long metacarpal fractures and an open long finger proximal phalanx fracture without signs of compartment syndrome or neurovascular injury     Plan:  · NWB LUE in splint   · Patient to OR today with Dr Heri Benito for ORIF of left hand   · PT/OT  · Pain control  · DVT ppx mechanical - Takes Eliquis at home which he may resume post operatively   · Dispo: Ortho will follow     Renata Johnson MD

## 2023-01-03 NOTE — ANESTHESIA POSTPROCEDURE EVALUATION
Post-Op Assessment Note    CV Status:  Stable  Pain Score: 0    Pain management: adequate     Mental Status:  Alert and awake   Hydration Status:  Euvolemic   PONV Controlled:  Controlled   Airway Patency:  Patent      Post Op Vitals Reviewed: Yes      Staff: CRNA, Anesthesiologist         No notable events documented      BP   105/66   Temp 97 6   Pulse 73   Resp 16   SpO2 100

## 2023-01-03 NOTE — ANESTHESIA PREPROCEDURE EVALUATION
Procedure:  DEBRIDEMENT HAND/FINGER [facility] OUT) (Left: Hand)  OPEN REDUCTION W/ INTERNAL FIXATION (ORIF) HAND - second and third metacarpals (Left: Hand)    EKG (01/02/22): Afib with slow ventricular response, HR 58bpm    STRESS ECHO (06/13/22):  SUMMARY:  -  Stress results: There was resting hypertension with an appropriate blood pressure response to stress  There was no chest pain during stress  -  ECG conclusions: The stress ECG was negative for ischemia and normal   -  Perfusion imaging: There were no perfusion defects   -  Gated SPECT: The calculated left ventricular ejection fraction was 65 %  Left ventricular ejection fraction was within normal limits by visual estimate  There was no left ventricular regional abnormality      IMPRESSIONS: Normal study after pharmacologic vasodilation without reproduction of symptoms  Myocardial perfusion imaging was normal at rest and with stress   Left ventricular systolic function was normal       Relevant Problems   ANESTHESIA (within normal limits)      CARDIO   (+) Atrial fibrillation (HCC)   (+) Hyperlipidemia   (+) Hypertension      ENDO (within normal limits)      GI/HEPATIC (within normal limits)      /RENAL (within normal limits)      GYN (within normal limits)      HEMATOLOGY (within normal limits)      MUSCULOSKELETAL (within normal limits)      NEURO/PSYCH (within normal limits)      PULMONARY (within normal limits)      Other   (+) Crush injury      Lab Results   Component Value Date    WBC 6 72 01/03/2023    HGB 12 7 01/03/2023    HCT 39 5 01/03/2023    MCV 96 01/03/2023     01/03/2023     Lab Results   Component Value Date     05/05/2018    SODIUM 140 01/03/2023    K 4 6 01/03/2023     01/03/2023    CO2 30 01/03/2023    ANIONGAP 11 7 05/05/2018    AGAP 4 01/03/2023    BUN 21 01/03/2023    CREATININE 1 12 01/03/2023    GLUC 106 01/03/2023    GLUF 99 12/20/2022    CALCIUM 8 5 01/03/2023    AST 18 04/23/2022    ALT 25 04/23/2022    ALKPHOS 62 04/23/2022    PROT 6 5 05/05/2018    TP 7 1 04/23/2022    BILITOT 1 1 (H) 05/05/2018    TBILI 0 97 04/23/2022    EGFR 64 01/03/2023     Lab Results   Component Value Date    PTT 31 01/02/2023     Lab Results   Component Value Date    INR 1 24 (H) 01/02/2023    INR 1 03 05/05/2018    PROTIME 15 8 (H) 01/02/2023       Physical Exam    Airway    Mallampati score: II  TM Distance: >3 FB  Neck ROM: full     Dental   No notable dental hx     Cardiovascular  Rhythm: irregular, Rate: normal,     Pulmonary  Pulmonary exam normal Breath sounds clear to auscultation,     Other Findings        Anesthesia Plan  ASA Score- 3     Anesthesia Type- IV sedation with anesthesia with ASA Monitors  Additional Monitors:   Airway Plan:     Comment: Sedation with nerve block  Plan Factors-Exercise tolerance (METS): >4 METS  Chart reviewed  EKG reviewed  Imaging results reviewed  Existing labs reviewed  Patient summary reviewed  Patient is not a current smoker  Patient did not smoke on day of surgery  Obstructive sleep apnea risk education given perioperatively  Induction- intravenous  Postoperative Plan- Plan for postoperative opioid use  Informed Consent- Anesthetic plan and risks discussed with patient  I personally reviewed this patient with the CRNA  Discussed and agreed on the Anesthesia Plan with the CRNA  Delfino Lawton

## 2023-01-03 NOTE — ASSESSMENT & PLAN NOTE
· Patient with known history of atrial fibrillation  Was followed by cardiology in the past    · Patient is on metoprolol and flecainide as outpatient  · Continue with metoprolol and flecainide  · Anticoagulated with Eliquis-hold Eliquis for now since the patient is the OR tomorrow  · EKG reviewed  Patient appears to be in A  Fib  · Restart back on the Eliquis once cleared by orthopedic    The patient has to be off of the anticoagulation for more than 24 hours patient needs to be started on heparin drip to bridge

## 2023-01-03 NOTE — ASSESSMENT & PLAN NOTE
The patient is a 53y Female complaining of shortness of breath. · Paroxysmal atrial fibrillation on metoprolol and flecainide  · Eliquis on hold perioperatively  · Restart when okay with hand surgery

## 2023-01-03 NOTE — ANESTHESIA PROCEDURE NOTES
Peripheral Block    Patient location during procedure: holding area  Start time: 1/3/2023 10:54 AM  Reason for block: at surgeon's request and post-op pain management  Staffing  Performed: Anesthesiologist and CRNA   Anesthesiologist: Duane Raza MD  Resident/CRNA: Krysten Mi CRNA  Preanesthetic Checklist  Completed: patient identified, IV checked, site marked, risks and benefits discussed, surgical consent, monitors and equipment checked, pre-op evaluation and timeout performed  Peripheral Block  Patient position: sitting  Prep: ChloraPrep  Patient monitoring: heart rate, cardiac monitor, continuous pulse ox and frequent blood pressure checks  Block type: supraclavicular  Laterality: left  Injection technique: single-shot  Procedures: ultrasound guided, Ultrasound guidance required for the procedure to increase accuracy and safety of medication placement and decrease risk of complications    Ultrasound permanent image savedlidocaine (PF) (XYLOCAINE-MPF) 1 % - Infiltration   5 mL - 1/3/2023 10:54:00 AM  bupivacaine (MARCAINE) 0 5 % - Perineural   20 mL - 1/3/2023 10:54:00 AM  Needle  Needle type: Stimuplex   Needle gauge: 22 G  Needle length: 10 cm  Needle localization: ultrasound guidance  Assessment  Injection assessment: negative aspiration for heme, no paresthesia on injection, local visualized surrounding nerve on ultrasound and incremental injection  Paresthesia pain: none  Heart rate change: no  Slow fractionated injection: yes  Post-procedure:  site cleaned  patient tolerated the procedure well with no immediate complications

## 2023-01-03 NOTE — CONSULTS
[address] [facility] 1950, 74 y o  male MRN: [id number]  Unit/Bed#: -01 Encounter: [id number]  Primary Care Provider: Byron Curtis MD   Date and time admitted to hospital: 1/2/2023 12:30 PM    Inpatient consult to Internal Medicine  Consult performed by: Rhonda Olvera MD  Consult ordered by: Johana Fagan MD          Crush injury  Assessment & Plan  · Patient had a crush injury to the hand  His hand was stuck in a log splitter  · Patient noted to  Multiple  Wounds and and metacarpal fractures  · Evaluated by orthopedics  Plan for surgical intervention noted  · Care per primary service    Preoperative clearance  Assessment & Plan  · Patient had left upper extremity injury suffered when his hand was stuck in a log splitter  · Orthopedic evaluation appreciate  · Plan for surgical evaluation noted  · Patient do not have any known history of coronary artery disease  Denies having chest pain or shortness of breath with activities  Patient is fairly active  · He is modified cardiac risk index is 0   · Chest x-ray and EKG reviewed  · Chest x-ray official read is pending  Possible cardiomegaly-since the patient did not have any chest pain or shortness of breath or evidence of fluid overload at acceptable risk for the procedure  · May need an echocardiogram    Hyperlipidemia  Assessment & Plan  · Patient reports that he takes pravastatin  · By reviewing the records patient is on 20 mg of pravastatin    Hypertension  Assessment & Plan  · Patient reported that he is on benazepril and amlodipine as outpatient  · Reviewing the outpatient records it appears that the patient is on 2 5 mg of Norvasc and 10 mg of benazepril  · We will start on amlodipine  · Hold benazepril since the patient is going to OR tomorrow    Atrial fibrillation [facility])  Assessment & Plan  · Patient with known history of atrial fibrillation    Was followed by cardiology in the past    · Patient is on metoprolol and flecainide as outpatient  · Continue with metoprolol and flecainide  · Anticoagulated with Eliquis-hold Eliquis for now since the patient is the OR tomorrow  · EKG reviewed  Patient appears to be in A  Fib  · Restart back on the Eliquis once cleared by orthopedic  The patient has to be off of the anticoagulation for more than 24 hours patient needs to be started on heparin drip to bridge        VTE Prophylaxis:   per primary    Recommendations for Discharge:  ·     Counseling / Coordination of Care Time: 60 minutes Greater than 50% of total time spent on patient counseling and coordination of care  Collaboration of Care: Were Recommendations Directly Discussed with Primary Treatment Team? Yes    History of Present Illness:  Mukul Fabian is a 74 y o  male who is originally admitted to the orthopedic service service due to crush injury to the hand  We are consulted for preop clearance  Patient reported that he was using a log splitter and he forgot that his hand is in the machine and suffered a crush injury to the hand  Patient denies any numbness or paresthesias  Patient suffered multiple wounds to the hand   He was evaluated by orthopedics  Patient is currently on a splint  Due to unstable metacarpal fracture plan for surgical intervention noted  Patient reported that he does not have any known history of coronary artery disease  Denies any chest pain with his activities  Patient reports that if he climbs up a hill he has to take a break in between to catch his breath  No family history of heart disease or stroke  Patient with history of atrial fibrillation and is maintained on metoprolol and flecainide  Patient takes Eliquis for anticoagulation  Patient denies any chest pain or shortness of breath currently    Review of Systems:  Review of Systems   Constitutional: Negative  HENT: Negative  Eyes: Negative  Respiratory: Negative  Gastrointestinal: Negative  Endocrine: Negative  Genitourinary: Negative  Musculoskeletal: Positive for joint swelling  Skin: Positive for wound  Allergic/Immunologic: Negative  Neurological: Negative  Hematological: Negative  Psychiatric/Behavioral: Negative  Past Medical and Surgical History:   Past Medical History:   Diagnosis Date   • Arthritis    • Atrial fibrillation ([address] )    • Cataract    • Glaucoma     Right eye   • Hypertension        Past Surgical History:   Procedure Laterality Date   • CHEST SURGERY      Unsure of surgery, pt had a trauma and had chest surgery anterior and L posterior   • FEMUR FRACTURE SURGERY Left    • HIP FRACTURE SURGERY Left    • KNEE SURGERY Left        Meds/Allergies:  all medications and allergies reviewed    Allergies: Allergies   Allergen Reactions   • Demerol [Meperidine]      Unknown reaction        Social History:  Marital Status: Single  Substance Use History:   Social History     Substance and Sexual Activity   Alcohol Use Yes    Comment: 12 drinks monthly     Social History     Tobacco Use   Smoking Status Never   Smokeless Tobacco Never     Social History     Substance and Sexual Activity   Drug Use No       Family History:  History reviewed  No pertinent family history      Physical Exam:   Vitals:   Blood Pressure: 133/74 (01/02/23 2327)  Pulse: 66 (01/02/23 2327)  Temperature: 98 2 °F (36 8 °C) (01/02/23 2327)  Temp Source: Oral (01/02/23 1235)  Respirations: 18 (01/02/23 1830)  SpO2: 96 % (01/02/23 2327)    Physical Exam     Additional Data:   Lab Results:    Results from last 7 days   Lab Units 01/02/23  1348   WBC Thousand/uL 9 48   HEMOGLOBIN g/dL 13 0   HEMATOCRIT % 40 7   PLATELETS Thousands/uL 281   NEUTROS PCT % 76*   LYMPHS PCT % 16   MONOS PCT % 6   EOS PCT % 1     Results from last 7 days   Lab Units 01/02/23  1348   SODIUM mmol/L 137   POTASSIUM mmol/L 5 0   CHLORIDE mmol/L 106   CO2 mmol/L 26   BUN mg/dL 22   CREATININE mg/dL 1 63*   ANION GAP mmol/L 5   CALCIUM mg/dL 8 5   GLUCOSE RANDOM mg/dL 151*     Results from last 7 days   Lab Units 01/02/23  1348   INR  1 24*         No results found for: HGBA1C            Imaging: Reviewed radiology reports from this admission including: chest xray  XR hand 3+ views LEFT   Final Result by Jones Rebolledo MD (01/02 1914)      Fractures index and middle finger metacarpals and proximal phalanx of middle finger as described  Small foreign body likely in soft tissues adjacent to the metacarpal of the thumb  Workstation performed: [id number]         XR wrist 3+ views LEFT   Final Result by Jones Rebolledo MD (01/02 1916)      Multiple hand fractures reported separately with dedicated hand study and small foreign body in soft tissues of thumb  No additional wrist fractures  Workstation performed: [id number]         XR hand 3+ vw left    (Results Pending)   XR follow up    (Results Pending)   XR follow up    (Results Pending)   XR follow up    (Results Pending)   XR follow up    (Results Pending)   XR chest portable    (Results Pending)       EKG, Pathology, and Other Studies Reviewed on Admission:   · EKG: Atrial fibrillation  HR 61     ** Please Note: This note may have been constructed using a voice recognition system   **

## 2023-01-03 NOTE — CASE MANAGEMENT
Case Management Progress Note    Patient name Alyx Clayton  Location [address] [id number] MRN [id number]  : 1950 Date 1/3/2023       LOS (days): 1  Geometric Mean LOS (GMLOS) (days): 2 70  Days to GMLOS:1 8        OBJECTIVE:        Current admission status: Inpatient  Preferred Pharmacy:   [id number] Michelle Sanchez, [address] [place] [address]  Phone: [phone] Fax: [phone]    Primary Care Provider: Sanford Garrison MD    Primary Insurance: MEDICARE  Secondary Insurance:     PROGRESS NOTE:  error

## 2023-01-03 NOTE — PROGRESS NOTES
[address] [facility]  Progress Note - Wilbert Hu 1950, 74 y o  male MRN: [id number]  Unit/Bed#: -01 Encounter: [id number]  Primary Care Provider: Luis Miguel Daniels MD   Date and time admitted to hospital: 1/2/2023 12:30 PM    * Crush injury of hand  Assessment & Plan  · Crush injury to hand after stuck in log splitter  · OR today, currently pain controlled    Constipation  Assessment & Plan  · Add bowel regimen    GEE (acute kidney injury) ([facility] [address] )  Assessment & Plan  · Present on admission  Resolved with IV fluids    Results from last 7 days   Lab Units 01/03/23  0630 01/02/23  1348   BUN mg/dL 21 22   CREATININE mg/dL 1 12 1 63*   EGFR ml/min/1 73sq m 64 40       Hyperlipidemia  Assessment & Plan  · Continue pravastatin    Hypertension  Assessment & Plan  · Continue amlodipine and metoprolol  Restart benazepril in a m  Atrial fibrillation (HCC)  Assessment & Plan  · Paroxysmal atrial fibrillation on metoprolol and flecainide  · Eliquis on hold perioperatively  · Restart when okay with hand surgery      VTE Pharmacologic Prophylaxis: VTE Score: 3 Moderate Risk (Score 3-4) - Pharmacological DVT Prophylaxis Contraindicated  Sequential Compression Devices Ordered  Patient Centered Rounds: I have performed bedside rounds with nursing staff today  Discussions with Specialists or Other Care Team Provider: Case management    Current Length of Stay: 1 day(s)  Current Patient Status: Inpatient   Certification Statement: The patient will continue to require additional inpatient hospital stay due to orthopedic surgery  Discharge Plan / Estimated Discharge Date:     Code Status: Level 1 - Full Code      Subjective:   Patient seen and examined  Turned from OR, no complaints  Pain controlled  Has not had bowel movement  Objective:   Vitals: Blood pressure (!) 171/81, pulse 58, temperature 98 1 °F (36 7 °C), temperature source Oral, resp  rate 16, SpO2 95 %      Intake/Output Summary (Last 24 hours) at 1/3/2023 1521  Last data filed at 1/3/2023 1451  Gross per 24 hour   Intake 1108 33 ml   Output 400 ml   Net 708 33 ml       Physical Exam  Vitals reviewed  Constitutional:       General: He is not in acute distress  Appearance: He is obese  HENT:      Head: Atraumatic  Eyes:      Extraocular Movements: Extraocular movements intact  Cardiovascular:      Rate and Rhythm: Regular rhythm  Pulmonary:      Breath sounds: No wheezing  Abdominal:      General: Bowel sounds are normal  There is distension  Palpations: Abdomen is soft  Tenderness: There is no abdominal tenderness  There is no rebound  Musculoskeletal:         General: Deformity (Left hand wrapped) present  No swelling or tenderness  Cervical back: Normal range of motion  Skin:     General: Skin is warm and dry  Neurological:      General: No focal deficit present  Mental Status: He is alert  Cranial Nerves: No cranial nerve deficit  Psychiatric:         Mood and Affect: Mood normal        Additional Data:   Labs:  Results from last 7 days   Lab Units 01/03/23  0630 01/02/23  1348   WBC Thousand/uL 6 72 9 48   HEMOGLOBIN g/dL 12 7 13 0   PLATELETS Thousands/uL 217 281   MCV fL 96 94   INR   --  1 24*     Results from last 7 days   Lab Units 01/03/23  0630 01/02/23  1348   SODIUM mmol/L 140 137   POTASSIUM mmol/L 4 6 5 0   CHLORIDE mmol/L 106 106   CO2 mmol/L 30 26   ANION GAP mmol/L 4 5   BUN mg/dL 21 22   CREATININE mg/dL 1 12 1 63*   CALCIUM mg/dL 8 5 8 5   EGFR ml/min/1 73sq m 64 40   GLUCOSE RANDOM mg/dL 106 151*                                      * I Have Reviewed All Lab Data Listed Above      Cultures:                   Lines/Drains:  Invasive Devices     Peripheral Intravenous Line  Duration           Peripheral IV 01/02/23 Right Antecubital 1 day              Telemetry:      Imaging:  Imaging Reports Reviewed Today Include:   XR chest portable    Result Date: 1/3/2023  Impression: Elevation of the left hemidiaphragm with subsegmental atelectasis in the left lung base  Workstation performed: [id number]     XR wrist 3+ views LEFT    Result Date: 1/2/2023  Impression: Multiple hand fractures reported separately with dedicated hand study and small foreign body in soft tissues of thumb  No additional wrist fractures  Workstation performed: [id number]     XR hand 3+ vw left    Result Date: 1/3/2023  Impression: 1  Mildly displaced fractures 2nd and 3rd metacarpal shafts, unchanged  2  Comminuted, nondisplaced fracture of the 3rd proximal phalanx with adjacent soft tissue gas suggesting open fracture  3  Tiny chip fracture seen along the distal medial hamate  4  Soft tissue swelling as above with 2 mm radiopaque density dorsal to the 1st metacarpal, possibly small foreign body  Workstation performed: [id number]     XR hand 3+ views LEFT    Result Date: 1/2/2023  Impression: Fractures index and middle finger metacarpals and proximal phalanx of middle finger as described  Small foreign body likely in soft tissues adjacent to the metacarpal of the thumb  Workstation performed: [id number]     XR follow up    Result Date: 1/3/2023  Impression: 1  Mildly displaced fractures 2nd and 3rd metacarpal shafts as above  2  Comminuted, nondisplaced fracture of the 3rd proximal phalanx  3  Tiny chip fracture distal medial hamate is obscured  4  2 mm radiopaque density projecting dorsal to 1st metacarpal, unchanged  Workstation performed: [id number]     XR follow up    Result Date: 1/3/2023  Impression: 1  Mildly displaced fractures 2nd and 3rd metacarpal shafts as above  2  Comminuted, nondisplaced fracture of the 3rd proximal phalanx  3  Tiny chip fracture distal medial hamate is obscured  4  2 mm radiopaque density projecting dorsal to the 1st metacarpal again seen  Workstation performed: [id number]     XR follow up    Result Date: 1/3/2023  Impression: 1   Mildly displaced fractures 2nd and 3rd metacarpal shafts as above  2  Comminuted, nondisplaced fracture of the 3rd proximal phalanx  3  Tiny chip fracture distal medial hamate is obscured  4  2 mm radiopaque density projecting dorsal to the 1st metacarpal again seen  Workstation performed: [id number]     XR follow up    Result Date: 1/3/2023  Impression: 1  Mildly displaced fracture 3rd metacarpal shaft, unchanged  2  Second metacarpal shaft fracture appears near anatomic alignment on this single view  3  Comminuted, nondisplaced fracture of the 3rd proximal phalanx with adjacent soft tissue gas suggesting open fracture  4  Tiny chip fracture seen along the distal medial hamate  Workstation performed: [id number]       Scheduled Meds:  Current Facility-Administered Medications   Medication Dose Route Frequency Provider Last Rate   • acetaminophen  650 mg Oral Q6H PRN Phillip Chopra PA-C     • acetaminophen  975 mg Oral [facility] Phillip Chopra PA-C     • [START ON 1/4/2023] amLODIPine  2 5 mg Oral Daily Phillip Chopra PA-C     • cefazolin  2,000 mg Intravenous Q8H Phillip Chopra PA-C 2,000 mg (01/03/23 0507)   • flecainide  50 mg Oral BID Phillip Chopra PA-C     • metoprolol succinate  25 mg Oral Daily Phillip Chopra PA-C     • morphine injection  2 mg Intravenous Q4H PRN Phillip Chopra PA-C     • ondansetron  4 mg Intravenous Q6H PRN Phillip Chopra PA-C     • oxyCODONE  5 mg Oral Q4H PRN Phillip Chopra PA-C     • pravastatin  20 mg Oral Daily With Michelle Crow PA-C     • traMADol  50 mg Oral Q6H PRN Phillip Chopra PA-C         Today, Patient Was Seen By: Bright Starkey DO    ** Please Note: Dictation voice to text software may have been used in the creation of this document   **

## 2023-01-03 NOTE — ASSESSMENT & PLAN NOTE
· Patient had left upper extremity injury suffered when his hand was stuck in a log splitter  · Orthopedic evaluation appreciate  · Plan for surgical evaluation noted  · Patient do not have any known history of coronary artery disease  Denies having chest pain or shortness of breath with activities  Patient is fairly active  · He is modified cardiac risk index is 0   · Chest x-ray and EKG reviewed  · Chest x-ray official read is pending    Possible cardiomegaly-since the patient did not have any chest pain or shortness of breath or evidence of fluid overload at acceptable risk for the procedure  · May need an echocardiogram

## 2023-01-03 NOTE — ASSESSMENT & PLAN NOTE
· Patient reports that he takes pravastatin     · By reviewing the records patient is on 20 mg of pravastatin

## 2023-01-03 NOTE — ASSESSMENT & PLAN NOTE
· Patient had a crush injury to the hand  His hand was stuck in a log splitter  · Patient noted to  Multiple  Wounds and and metacarpal fractures  · Evaluated by orthopedics    Plan for surgical intervention noted  · Care per primary service

## 2023-01-04 VITALS
SYSTOLIC BLOOD PRESSURE: 126 MMHG | HEART RATE: 69 BPM | TEMPERATURE: 98.6 F | DIASTOLIC BLOOD PRESSURE: 67 MMHG | RESPIRATION RATE: 18 BRPM | OXYGEN SATURATION: 95 %

## 2023-01-04 LAB
ALBUMIN SERPL BCP-MCNC: 3.4 G/DL (ref 3.5–5)
ALP SERPL-CCNC: 60 U/L (ref 46–116)
ALT SERPL W P-5'-P-CCNC: 13 U/L (ref 12–78)
ANION GAP SERPL CALCULATED.3IONS-SCNC: 4 MMOL/L (ref 4–13)
AST SERPL W P-5'-P-CCNC: 19 U/L (ref 5–45)
BILIRUB SERPL-MCNC: 0.73 MG/DL (ref 0.2–1)
BUN SERPL-MCNC: 18 MG/DL (ref 5–25)
CALCIUM ALBUM COR SERPL-MCNC: 9 MG/DL (ref 8.3–10.1)
CALCIUM SERPL-MCNC: 8.5 MG/DL (ref 8.3–10.1)
CHLORIDE SERPL-SCNC: 104 MMOL/L (ref 96–108)
CO2 SERPL-SCNC: 29 MMOL/L (ref 21–32)
CREAT SERPL-MCNC: 0.97 MG/DL (ref 0.6–1.3)
ERYTHROCYTE [DISTWIDTH] IN BLOOD BY AUTOMATED COUNT: 12.4 % (ref 11.6–15.1)
GFR SERPL CREATININE-BSD FRML MDRD: 76 ML/MIN/1.73SQ M
GLUCOSE SERPL-MCNC: 97 MG/DL (ref 65–140)
HCT VFR BLD AUTO: 39.5 % (ref 36.5–49.3)
HGB BLD-MCNC: 12.5 G/DL (ref 12–17)
MCH RBC QN AUTO: 30.1 PG (ref 26.8–34.3)
MCHC RBC AUTO-ENTMCNC: 31.6 G/DL (ref 31.4–37.4)
MCV RBC AUTO: 95 FL (ref 82–98)
PLATELET # BLD AUTO: 212 THOUSANDS/UL (ref 149–390)
PMV BLD AUTO: 10.1 FL (ref 8.9–12.7)
POTASSIUM SERPL-SCNC: 4.7 MMOL/L (ref 3.5–5.3)
PROT SERPL-MCNC: 6.6 G/DL (ref 6.4–8.4)
RBC # BLD AUTO: 4.15 MILLION/UL (ref 3.88–5.62)
SODIUM SERPL-SCNC: 137 MMOL/L (ref 135–147)
WBC # BLD AUTO: 7.08 THOUSAND/UL (ref 4.31–10.16)

## 2023-01-04 RX ORDER — ACETAMINOPHEN 325 MG/1
650 TABLET ORAL EVERY 8 HOURS PRN
Status: DISCONTINUED | OUTPATIENT
Start: 2023-01-04 | End: 2023-01-04 | Stop reason: HOSPADM

## 2023-01-04 RX ORDER — AMOXICILLIN AND CLAVULANATE POTASSIUM 875; 125 MG/1; MG/1
1 TABLET, FILM COATED ORAL EVERY 12 HOURS SCHEDULED
Qty: 14 TABLET | Refills: 0 | Status: SHIPPED | OUTPATIENT
Start: 2023-01-04 | End: 2023-01-11

## 2023-01-04 RX ORDER — HYDROCODONE BITARTRATE AND ACETAMINOPHEN 5; 325 MG/1; MG/1
1 TABLET ORAL EVERY 6 HOURS PRN
Qty: 15 TABLET | Refills: 0 | Status: SHIPPED | OUTPATIENT
Start: 2023-01-04 | End: 2023-01-14

## 2023-01-04 RX ADMIN — CEFAZOLIN SODIUM 2000 MG: 2 SOLUTION INTRAVENOUS at 11:45

## 2023-01-04 RX ADMIN — SENNOSIDES AND DOCUSATE SODIUM 1 TABLET: 8.6; 5 TABLET ORAL at 08:08

## 2023-01-04 RX ADMIN — NAPROXEN 250 MG: 250 TABLET ORAL at 08:09

## 2023-01-04 RX ADMIN — AMLODIPINE BESYLATE 2.5 MG: 2.5 TABLET ORAL at 08:07

## 2023-01-04 RX ADMIN — HYDROCODONE BITARTRATE AND ACETAMINOPHEN 1 TABLET: 5; 325 TABLET ORAL at 05:14

## 2023-01-04 RX ADMIN — FLECAINIDE ACETATE 50 MG: 50 TABLET ORAL at 08:08

## 2023-01-04 RX ADMIN — METOPROLOL SUCCINATE 25 MG: 25 TABLET, FILM COATED, EXTENDED RELEASE ORAL at 08:08

## 2023-01-04 RX ADMIN — LISINOPRIL 10 MG: 10 TABLET ORAL at 08:07

## 2023-01-04 RX ADMIN — APIXABAN 5 MG: 5 TABLET, FILM COATED ORAL at 09:05

## 2023-01-04 RX ADMIN — CEFAZOLIN SODIUM 2000 MG: 2 SOLUTION INTRAVENOUS at 04:08

## 2023-01-04 RX ADMIN — POLYETHYLENE GLYCOL 3350 17 G: 17 POWDER, FOR SOLUTION ORAL at 08:08

## 2023-01-04 NOTE — ASSESSMENT & PLAN NOTE
· Last BM noted to be on 1/2/2023 per nursing documentation   · Continue scheduled miralax and senakot   · Monitor stool output

## 2023-01-04 NOTE — OP NOTE
OPERATIVE REPORT  PATIENT NAME: Biju Officer  :  1950  MRN: [id number]  Pt Location: [facility] OR    SURGERY DATE: 23    Surgeon(s) and Role:     * Johnny Cavanaugh MD - Primary     * Loretta Sue PA-C - Assisting    Pre-Op Diagnosis:  Open displaced fracture of shaft of second metacarpal bone of left hand, initial encounter [S62 321B]  Open displaced fracture of shaft of third metacarpal bone of left hand, initial encounter [S62 323B]  Closed nondisplaced fracture of proximal phalanx of left middle finger, initial encounter   Laceration dorsal aspect left hand ring finger proximal phalanx    Post-Op Diagnosis:   Open displaced fracture of shaft of second metacarpal bone of left hand, initial encounter [S62 321B]  Open displaced fracture of shaft of third metacarpal bone of left hand, initial encounter [S62 323B]  Closed nondisplaced fracture of proximal phalanx of left middle finger, initial encounter  Laceration dorsal aspect left hand ring finger proximal phalanx    Procedure(s) (LRB):  Irrigation and debridement of the left hand open index and long finger metacarpal fractures  (Skin, subcutaneous tissue, muscle, fascia, and bone)  Irrigation and debridement skin, subcutaneous tissue, fascia and tendon left hand ring finger proximal phalanx wound  (Left)  Open reduction and internal fixation left hand index and long finger metacarpal fractures  Open reduction and internal fixation left hand long finger proximal phalanx fracture (Left)  Application short arm splint (Left)    Specimen(s):  * No orders in the log *    Estimated Blood Loss:   Minimal      Anesthesia Type:   Choice    Operative Indications: The patient has a history of a crush injury to the left hand with open index and long finger metacarpal fractures and a closed long finger proximal phalanx fracture with open laceration of the left hand ring finger proximal phalanx that was recalcitrant to conservative management    The decision was made to bring the patient to the operating room for irrigation and debridement of the left hand with operative fixation of the fractures  Risks of the procedure were explained which include, but are not limited to bleeding; infection; damage to nerves, arteries,veins, tendons; scar; pain; need for reoperation; failure to give desired result; and risks of anaesthesia  All questions were answered to satisfaction and they were willing to proceed  Operative Findings:  Open transverse index finger metacarpal fracture  Open transverse long finger metacarpal fracture  Closed comminuted proximal phalanx fracture left hand long finger  Large NG tube no laceration left hand ring finger proximal phalanx    Complications:   None    Procedure and Technique:  After the patient, site, and procedure were identified, the patient was brought into the operating room in a supine position  Regional and general anaesthesia were provided  A well padded tourniquet was applied to the extremity, set at 250 mmHg  The  left upper extremity was then prepped and drapped in a normal, sterile, orthopedic fashion  After the patient, site, and procedure identified attention was turned towards the left arm  An Esmarch bandage was used to exsanguinate the limb and the tourniquet was inflated to 250 mmHg  We turned our attention towards the left hand  The previous open lacerations over the index and long finger metacarpal dorsally were then extended  We dissected down through the skin and subcutaneous tissues maintaining hemostasis  We immediately identified this open injury that extended to the index and long finger metacarpals  There were transverse fractures noted  There was some gross contamination within the wound in terms of foreign debris  There is no evidence of infection or purulence    Utilizing sharp instrumentation, and excisional debridement back to healthy viable tissue was then performed with care taken to remove foreign debris  We debrided the skin, subcutaneous tissue, fascia, dorsal muscle, and the index and long finger metacarpals  At this point the total hand wound measured approximately 3 cm wide by 1 cm in length and extended to the metacarpals  Transverse fractures were noted, there is no evidence of injury to the extensor tendons  Once we were satisfied with our thorough debridement we turned our attention towards the left hand ring finger proximal phalanx  This laceration measured approximately 2 cm in length, 4 mm in width and extended to the level of the extensor tendon  We did notice some foreign debris  A debridement was then performed utilizing sharp instrumentation which was excisional in nature including the skin, subcutaneous tissue, and extensor tendon  This was debrided back removing all foreign material back to healthy viable tissue  We identified no considerable injury to the extensor tendon minus minimal fraying  At this point in time, all wounds were copiously irrigated with sterile saline solution utilizing high flow and low pressure  Attention was then turned towards the fractures of the index and long finger metacarpal   Utilizing the guidewire from the internal medullary screw set, this was placed in an antegrade direction at the distal portion of the metacarpals at the metacarpal head  The fractures were then reduced and these wires were driven retrograde back to the base of the index and long finger metacarpals  AP, lateral, and oblique x-rays were taken which confirmed excellent reduction of the index and long finger metacarpals  The pins were then overdrilled, and 3 5 mm wide 55 mm long screws were then placed down the medullary canal of the index and long finger metacarpals  These were buried appropriately and guidewires were removed  AP, lateral, and oblique images were taken which confirmed excellent reduction and hardware placement    Attention was then turned towards the long finger proximal phalanx fracture  Small incision was made over the proximal interphalangeal joint  We dissected down through skin subcutaneous tissues, made a longitudinal incision in the extensor tendon and expose the occipital phalangeal head  A retrograde guidewire was then inserted up the proximal phalanx to the center center position proximally  This was then overdrilled and a size 40 mm Synthes headless compression screw was then inserted in a retrograde direction down the proximal phalanx to secure and stabilize the fracture fragments  AP, lateral, and oblique x-rays were taken which confirmed good hardware placement and fracture reduction  Guidewires were removed  At this point, tourniquet was deflated  At the completion of the procedure, hemostasis was obtained with cautery and direct pressure  The wounds were copiously irrigated with sterile solution  The wounds were closed with Prolene  Sterile dressings were applied, including Xeroform, gauze, tweeners, webril, ACE and Volar Splint  Please note, all sponge, needle, and instrument counts were correct prior to closure  Loupe magnification was utilized  The patient tolerated the procedure well       I was present for all critical portions of the procedure, A qualified resident physician was not available and A physician assistant was required during the procedure for retraction tissue handling,dissection and suturing    Patient Disposition:  PACU , hemodynamically stable and extubated and stable    SIGNATURE: Gael Hall MD  DATE: 01/04/23  TIME: 1:02 PM Job Mckeon)  Urology  [address], [address]  [place] [address]  Phone: [phone]  Fax: [phone]  Follow Up Time: 1-3 Days

## 2023-01-04 NOTE — ASSESSMENT & PLAN NOTE
· Hx of paroxysmal atrial fibrillation  · Continue home Toprol XL 25 mg daily and Flecainide 50 mg BID for rate control   · Typically maintained on PO Eliquis for a/c, on hold due to recent orthopedic procedure, likely resume today once cleared by hand surgery

## 2023-01-04 NOTE — NURSING NOTE
All discharge paperwork, medications and follow-up appointments reviewed with patient  Patient verbalized understanding  Family en-route to pick-up patient  Pending

## 2023-01-04 NOTE — ASSESSMENT & PLAN NOTE
· POA, likely in setting of volume depletion, poor PO intake  · Cr  1 63 on admission, now resolved and back to baseline at 0 97 s/p IVF hydration   · IVF now discontinued, encourage PO intake     Results from last 7 days   Lab Units 01/04/23  0422 01/03/23  0630 01/02/23  1348   BUN mg/dL 18 21 22   CREATININE mg/dL 0 97 1 12 1 63*   EGFR ml/min/1 73sq m 76 64 40

## 2023-01-04 NOTE — PLAN OF CARE
Problem: PAIN - ADULT  Goal: Verbalizes/displays adequate comfort level or baseline comfort level  Description: Interventions:  - Encourage patient to monitor pain and request assistance  - Assess pain using appropriate pain scale  - Administer analgesics based on type and severity of pain and evaluate response  - Implement non-pharmacological measures as appropriate and evaluate response  - Consider cultural and social influences on pain and pain management  - Notify physician/advanced practitioner if interventions unsuccessful or patient reports new pain  Outcome: Progressing     Problem: INFECTION - ADULT  Goal: Absence or prevention of progression during hospitalization  Description: INTERVENTIONS:  - Assess and monitor for signs and symptoms of infection  - Monitor lab/diagnostic results  - Monitor all insertion sites, i e  indwelling lines, tubes, and drains  - Monitor endotracheal if appropriate and nasal secretions for changes in amount and color  - [place] appropriate cooling/warming therapies per order  - Administer medications as ordered  - Instruct and encourage patient and family to use good hand hygiene technique  - Identify and instruct in appropriate isolation precautions for identified infection/condition  Outcome: Progressing  Goal: Absence of fever/infection during neutropenic period  Description: INTERVENTIONS:  - Monitor WBC    Outcome: Progressing     Problem: SAFETY ADULT  Goal: Patient will remain free of falls  Description: INTERVENTIONS:  - Educate patient/family on patient safety including physical limitations  - Instruct patient to call for assistance with activity   - Consult OT/PT to assist with strengthening/mobility   - Keep Call bell within reach  - Keep bed low and locked with side rails adjusted as appropriate  - Keep care items and personal belongings within reach  - Initiate and maintain comfort rounds  - Make Fall Risk Sign visible to staff  - Offer Toileting every  Hours, in advance of need  - Initiate/Maintain alarm  - Obtain necessary fall risk management equipment:   - Apply yellow socks and bracelet for high fall risk patients  - Consider moving patient to room near nurses station  Outcome: Progressing  Goal: Maintain or return to baseline ADL function  Description: INTERVENTIONS:  -  Assess patient's ability to carry out ADLs; assess patient's baseline for ADL function and identify physical deficits which impact ability to perform ADLs (bathing, care of mouth/teeth, toileting, grooming, dressing, etc )  - Assess/evaluate cause of self-care deficits   - Assess range of motion  - Assess patient's mobility; develop plan if impaired  - Assess patient's need for assistive devices and provide as appropriate  - Encourage maximum independence but intervene and supervise when necessary  - Involve family in performance of ADLs  - Assess for home care needs following discharge   - Consider OT consult to assist with ADL evaluation and planning for discharge  - Provide patient education as appropriate  Outcome: Progressing  Goal: Maintains/Returns to pre admission functional level  Description: INTERVENTIONS:  - Perform BMAT or MOVE assessment daily    - Set and communicate daily mobility goal to care team and patient/family/caregiver  - Collaborate with rehabilitation services on mobility goals if consulted  - Perform Range of Motion  times a day  - Reposition patient every  hours    - Dangle patient  times a day  - Stand patient  times a day  - Ambulate patient  times a day  - Out of bed to chair  times a day   - Out of bed for meals  times a day  - Out of bed for toileting  - Record patient progress and toleration of activity level   Outcome: Progressing     Problem: DISCHARGE PLANNING  Goal: Discharge to home or other facility with appropriate resources  Description: INTERVENTIONS:  - Identify barriers to discharge w/patient and caregiver  - Arrange for needed discharge resources and transportation as appropriate  - Identify discharge learning needs (meds, wound care, etc )  - Arrange for interpretive services to assist at discharge as needed  - Refer to Case Management Department for coordinating discharge planning if the patient needs post-hospital services based on physician/advanced practitioner order or complex needs related to functional status, cognitive ability, or social support system  Outcome: Progressing     Problem: Knowledge Deficit  Goal: Patient/family/caregiver demonstrates understanding of disease process, treatment plan, medications, and discharge instructions  Description: Complete learning assessment and assess knowledge base    Interventions:  - Provide teaching at level of understanding  - Provide teaching via preferred learning methods  Outcome: Progressing

## 2023-01-04 NOTE — PROGRESS NOTES
[address] [facility]  Progress Note - Leora Howard 1950, 74 y o  male MRN: [id number]  Unit/Bed#: -01 Encounter: [id number]  Primary Care Provider: Drew Schmidt MD   Date and time admitted to hospital: 1/2/2023 12:30 PM      DOS: 1/4/2023  * Crush injury of hand  Assessment & Plan  · Pt sustained crush injury to the left hand with log splitter   · Orthopedics is primary,   · POD #1 s/p left hand I&D, ORIF index and long finger metacarpal fx, long finger proximal phalanx fracture fixation and wound closure  · NWB LUE in splint  · Currently on IV Ancef, abx per primary   · PT/OT  · APS consulted for aide in pain management, appreciate recommendations    Constipation  Assessment & Plan  · Last BM noted to be on 1/2/2023 per nursing documentation   · Continue scheduled miralax and senakot   · Monitor stool output    GEE (acute kidney injury) ([facility] [address] )  Assessment & Plan  · POA, likely in setting of volume depletion, poor PO intake  · Cr  1 63 on admission, now resolved and back to baseline at 0 97 s/p IVF hydration   · IVF now discontinued, encourage PO intake     Results from last 7 days   Lab Units 01/04/23  0422 01/03/23  0630 01/02/23  1348   BUN mg/dL 18 21 22   CREATININE mg/dL 0 97 1 12 1 63*   EGFR ml/min/1 73sq m 76 64 40       Hyperlipidemia  Assessment & Plan  · Continue pravastatin 20 mg daily    Hypertension  Assessment & Plan  · BP stable on review  · Continue amlodipine 2 5 mg daily, lisinopril 10 mg daily and Toprol XL 25 mg daily  · Monitor     Atrial fibrillation (HCC)  Assessment & Plan  · Hx of paroxysmal atrial fibrillation  · Continue home Toprol XL 25 mg daily and Flecainide 50 mg BID for rate control   · Typically maintained on PO Eliquis for a/c, on hold due to recent orthopedic procedure, likely resume today once cleared by hand surgery       VTE Pharmacologic Prophylaxis: VTE Score: 3 Moderate Risk (Score 3-4) - Pharmacological DVT Prophylaxis Ordered: apixaban (Eliquis)  Patient Centered Rounds: I evaluated the patient without nursing staff present due to speaking to nurse outside patient's room   Discussions with Specialists or Other Care Team Provider: Discussed with RN, CM and reviewed orthopedic notes     Education and Discussions with Family / Patient: Discussed with patient, primary ortho patient        Time Spent for Care: 20 minutes  More than 50% of total time spent on counseling and coordination of care as described above  Current Length of Stay: 2 day(s)  Current Patient Status: Inpatient   Certification Statement: The patient will continue to require additional inpatient hospital stay due to ongoing orthopedic evaluation, pain management  Discharge Plan: [facility] is following this patient on consult  They are not yet medically stable for discharge secondary to PT, ongoing ortho recommendations   Code Status: Level 1 - Full Code    Subjective:   Pt reports that he is doing fairly well today  Reports that his pain is manageable, that the medications he is receiving here is helping his pain  Reports his pain is currently a 4/10  Reports a good appetite, endorses constipation but states that prior to today he wasn't eating much since he needed to have surgery  Objective:     Vitals:   Temp (24hrs), Av °F (36 7 °C), Min:97 3 °F (36 3 °C), Max:98 6 °F (37 °C)    Temp:  [97 3 °F (36 3 °C)-98 6 °F (37 °C)] 98 6 °F (37 °C)  HR:  [55-69] 69  Resp:  [13-20] 18  BP: (105-171)/(66-81) 126/67  SpO2:  [95 %-97 %] 95 %  There is no height or weight on file to calculate BMI  Input and Output Summary (last 24 hours): Intake/Output Summary (Last 24 hours) at 2023 0858  Last data filed at 2023 0805  Gross per 24 hour   Intake 1120 ml   Output 1150 ml   Net -30 ml       Physical Exam:   Physical Exam  Vitals reviewed  Constitutional:       General: He is not in acute distress  Appearance: He is not toxic-appearing  Comments: Pt is in no acute distress lying in his hospital bed resting comfortably   Left upper extremity in splint, ace wrap   Cardiovascular:      Rate and Rhythm: Normal rate and regular rhythm  Pulmonary:      Effort: Pulmonary effort is normal  No respiratory distress  Breath sounds: No wheezing  Abdominal:      General: There is no distension  Palpations: Abdomen is soft  Musculoskeletal:      Right lower leg: No edema  Left lower leg: No edema  Skin:     General: Skin is warm and dry  Neurological:      Mental Status: He is alert  Psychiatric:         Mood and Affect: Mood normal           Additional Data:     Labs:  Results from last 7 days   Lab Units 01/04/23  0422 01/03/23  0630 01/02/23  1348   WBC Thousand/uL 7 08   < > 9 48   HEMOGLOBIN g/dL 12 5   < > 13 0   HEMATOCRIT % 39 5   < > 40 7   PLATELETS Thousands/uL 212   < > 281   NEUTROS PCT %  --   --  76*   LYMPHS PCT %  --   --  16   MONOS PCT %  --   --  6   EOS PCT %  --   --  1    < > = values in this interval not displayed       Results from last 7 days   Lab Units 01/04/23  0422   SODIUM mmol/L 137   POTASSIUM mmol/L 4 7   CHLORIDE mmol/L 104   CO2 mmol/L 29   BUN mg/dL 18   CREATININE mg/dL 0 97   ANION GAP mmol/L 4   CALCIUM mg/dL 8 5   ALBUMIN g/dL 3 4*   TOTAL BILIRUBIN mg/dL 0 73   ALK PHOS U/L 60   ALT U/L 13   AST U/L 19   GLUCOSE RANDOM mg/dL 97     Results from last 7 days   Lab Units 01/02/23  1348   INR  1 24*                   Lines/Drains:  Invasive Devices     Peripheral Intravenous Line  Duration           Peripheral IV 01/02/23 Right Antecubital 1 day                      Imaging: Reviewed radiology reports from this admission including: xray(s)    Recent Cultures (last 7 days):         Last 24 Hours Medication List:   Current Facility-Administered Medications   Medication Dose Route Frequency Provider Last Rate   • acetaminophen  650 mg Oral Q6H PRN Chetan Lewis MD     • amLODIPine  2 5 mg Oral Daily Kathi Schneider PA-C     • apixaban  5 mg Oral BID Kenyetta Sequeira MD     • cefazolin  2,000 mg Intravenous K8W Kathi Schneider PA-C 2,000 mg (01/04/23 0408)   • flecainide  50 mg Oral BID Kathi Shcneider PA-C     • HYDROcodone-acetaminophen  1 tablet Oral Q6H PRN Kenyetta Sequeira MD     • lisinopril  10 mg Oral Daily Tricia Lam DO     • metoprolol succinate  25 mg Oral Daily Kathi Schneider PA-C     • morphine injection  2 mg Intravenous Q4H PRN Kathi Schneider PA-C     • naproxen  250 mg Oral BID With Meals Kenyetta Sequeira MD     • ondansetron  4 mg Intravenous Q6H PRN Kathi Schneider PA-C     • polyethylene glycol  17 g Oral Daily Tricia Lam DO     • pravastatin  20 mg Oral Daily With Pinky GERTRUDIS Frey     • senna-docusate sodium  1 tablet Oral BID Tricia Lam DO          Today, Patient Was Seen By: Lawyer Simmonds, PA-C    **Please Note: This note may have been constructed using a voice recognition system  **

## 2023-01-04 NOTE — ASSESSMENT & PLAN NOTE
· BP stable on review  · Continue amlodipine 2 5 mg daily, lisinopril 10 mg daily and Toprol XL 25 mg daily  · Monitor

## 2023-01-04 NOTE — PROGRESS NOTES
Progress Note - Orthopedics   Darwin Tello 74 y o  male MRN: [id number]  Unit/Bed#: [facility] -01      Subjective:    72 y o male POD 1 Left hand I&D, ORIF index and long finger Metacarpal fx, long finger proximal phalanx fx fixation and wound closure  No acute events, no new complaints  Patient doing well  Pain well controlled  Denies fevers, chills, CP, SOB, N/V, numbness or tingling  Patient reports no issues with urination or bowel movements  Patient states he is doing well this morning and in minimal pain      Labs:  0   Lab Value Date/Time    HCT 39 5 01/04/2023 0422    HCT 39 5 01/03/2023 0630    HCT 40 7 01/02/2023 1348    HCT 45 1 05/05/2018 0828    HGB 12 5 01/04/2023 0422    HGB 12 7 01/03/2023 0630    HGB 13 0 01/02/2023 1348    HGB 15 4 05/05/2018 0828    INR 1 24 (H) 01/02/2023 1348    INR 1 03 05/05/2018 0828    WBC 7 08 01/04/2023 0422    WBC 6 72 01/03/2023 0630    WBC 9 48 01/02/2023 1348    WBC 5 4 05/05/2018 0828       Meds:    Current Facility-Administered Medications:   •  acetaminophen (TYLENOL) tablet 650 mg, 650 mg, Oral, Q6H PRN, Lesia Mustafa MD  •  amLODIPine (NORVASC) tablet 2 5 mg, 2 5 mg, Oral, Daily, Amanda Bustillo PA-C  •  ceFAZolin (ANCEF) IVPB (premix in dextrose) 2,000 mg 50 mL, 2,000 mg, Intravenous, Q8H, Amanda Bustillo PA-C, Last Rate: 100 mL/hr at 01/04/23 0408, 2,000 mg at 01/04/23 0408  •  flecainide (TAMBOCOR) tablet 50 mg, 50 mg, Oral, BID, Amanda Bustillo PA-C, 50 mg at 01/03/23 2021  •  HYDROcodone-acetaminophen ([facility]) 5-325 mg per tablet 1 tablet, 1 tablet, Oral, Q6H PRN, Lesia Mustafa MD, 1 tablet at 01/04/23 2390  •  lisinopril (ZESTRIL) tablet 10 mg, 10 mg, Oral, Daily, Marcin Guallpa DO  •  metoprolol succinate (TOPROL-XL) 24 hr tablet 25 mg, 25 mg, Oral, Daily, Amanda Bustillo PA-C, 25 mg at 01/03/23 1733  •  morphine injection 2 mg, 2 mg, Intravenous, Q4H PRN, Amanda Bustillo PA-C  •  naproxen (NAPROSYN) tablet 250 mg, 250 mg, Oral, BID With Meals, Sanjuana Mcdonald MD, 250 mg at 01/03/23 1752  •  ondansetron [facility]) injection 4 mg, 4 mg, Intravenous, Q6H PRN, Óscar Swanson PA-C  •  polyethylene glycol (MIRALAX) packet 17 g, 17 g, Oral, Daily, Marcin Guallpa DO  •  pravastatin (PRAVACHOL) tablet 20 mg, 20 mg, Oral, Daily With Dinner, Óscar Swanson PA-C, 20 mg at 01/03/23 1709  •  senna-docusate sodium (SENOKOT S) 8 6-50 mg per tablet 1 tablet, 1 tablet, Oral, BID, Marcin Guallpa DO, 1 tablet at 01/03/23 1709    Blood Culture:   No results found for: BLOODCX    Wound Culture:   No results found for: WOUNDCULT    Ins and Outs:  I/O last 24 hours: In: 1828 3 [P O :720; I V :1108 3]  Out: 650 [Urine:650]          Physical:  Vitals:    01/03/23 2319   BP: 132/68   Pulse:    Resp: 18   Temp: 98 6 °F (37 °C)   SpO2:      Musculoskeletal: left Upper Extremity  · Skin warm, dry   No erythema or ecchymosis  · Dressing c/d/i in splint  · Generalized TTP around left hand in splint  · Sensation intact to median/radial/ulnar nerve distribution   · Able to flex and extend thumb in splint, able to wiggle fingers in splint  · 2+ radial pulse, symmetric bilaterally  · Digits warm and well perfused      Assessment:    72 y o male POD 1 Left hand I&D, ORIF index and long finger Metacarpal fx, long finger proximal phalanx fx fixation and wound closure  Patient doing well  Will benefit from PT and antibiotics  Plan:  · NWB LUE in splint  · Transition from IV to oral abx today    · PT/OT  · Pain control  · DVT ppx mechanical: will resume Eliquis  · Dispo: Ortho will follow    Sanjuana Mcdonald MD

## 2023-01-04 NOTE — ASSESSMENT & PLAN NOTE
· Pt sustained crush injury to the left hand with log splitter   · Orthopedics is primary,   · POD #1 s/p left hand I&D, ORIF index and long finger metacarpal fx, long finger proximal phalanx fracture fixation and wound closure  · NWB LUE in splint  · Currently on IV Ancef, abx per primary   · PT/OT  · APS consulted for aide in pain management, appreciate recommendations

## 2023-01-04 NOTE — DISCHARGE SUMMARY
Discharge Summary - Orthopedics   Wilbert  74 y o  male MRN: [id number]  Unit/Bed#: [facility] 565-01    Attending Physician: [facility]    Admitting diagnosis: Paroxysmal atrial fibrillation ([facility] [address] ) [I48 0]  Hand injury [S69 90XA]  Fractured hand [S62 90XA]  Open nondisplaced fracture of proximal phalanx of left middle finger, initial encounter [S62 643B]  Open displaced fracture of shaft of third metacarpal bone of left hand, initial encounter [S62 323B]  Open displaced fracture of shaft of second metacarpal bone of left hand, initial encounter [S62 321B]    Discharge diagnosis: Paroxysmal atrial fibrillation ([facility] [address] ) [I48 0]  Hand injury [S69 90XA]  Fractured hand [S62 90XA]  Open nondisplaced fracture of proximal phalanx of left middle finger, initial encounter [S62 643B]  Open displaced fracture of shaft of third metacarpal bone of left hand, initial encounter [S62 323B]  Open displaced fracture of shaft of second metacarpal bone of left hand, initial encounter [S62 321B]    Date of admission: 1/2/2023    Date of discharge: 01/04/23    Procedure: Left hand I&D, ORIF index and long finger Metacarpal fx, long finger proximal phalanx fx, wound closure     HPI & Hospital course:  74 y o  male who presented to the ED after a crush injury to the left hand from a log splitter sustaining open long finger metacarpal fracture, open index finger metacarpal fracture, and open proximal phalanx fracture of the long finger  Pt was admitted to the orthopaedic service and taken to the OR on 1/3/23 for Left hand I&D, ORIF index and long finger Metacarpal fx, long finger proximal phalanx fx and wound closure  Prior to surgery the risks and benefits of surgery were explained and informed consent was obtained  Surgery went without complications and pt was discharged to the PACU in a stable condition and was transferred to the floor  On discharge date pt was cleared by PT and the medicine team and determined to be safe for discharge  Daily discussion was had with the patient, nursing staff, orthopaedic team, and family members if present  All questions were answered to the patients satisifaction  0   Lab Value Date/Time    HGB 12 5 01/04/2023 0422    HGB 12 7 01/03/2023 0630    HGB 13 0 01/02/2023 1348    HGB 14 8 12/20/2022 0928    HGB 14 6 04/23/2022 0709    HGB 14 7 10/18/2021 0731    HGB 15 5 03/20/2021 0709    HGB 14 5 09/30/2020 0902    HGB 15 2 03/03/2020 0811    HGB 14 8 12/09/2019 0715    HGB 14 6 06/15/2018 1348    HGB 15 4 05/05/2018 0828         Discharge Instructions:   · NWB LUE in splint  · Continue oral abx as prescribed  · Keep dressings clean and dry at all times   · Complete DVT prophylaxis as prescribed   · Physical therapy  · Follow-up as scheduled, otherwise call for appt  Discharge Medications: For the complete list of discharge medications, please refer to the patient's medication reconciliation

## 2023-01-04 NOTE — CONSULTS
Peripheral Nerve Block Follow-up Note - Acute Pain Service    Power Siddiqui 74 y o  male MRN: [id number]  Unit/Bed#: -01 Encounter: [id number]      Assessment:   Principal Problem:    Crush injury of hand  Active Problems:    Atrial fibrillation ([facility] [address] )    Hypertension    Hyperlipidemia    GEE (acute kidney injury) ([facility] [address] )    Constipation    Power Siddiqui is a 74y o  year old male with a past medical history significant for hypertension, hyperlipidemia, atrial fibrillation on apixaban at home who presents with crush injury to the left hand after his hand was stuck in a log splitter  Patient was found to have open second and third metacarpal fractures, open proximal phalanx fracture at the long finger, open wound at the dorsal aspect of the ring finger proximal phalanx with extensor tendon exposure  Patient underwent left hand incision and drainage, ORIF index and long finger metacarpal fracture, long finger proximal phalanx fracture fixation, and wound closure on 1/3/2023  Patient underwent left single shot supraclavicular peripheral nerve block for postoperative pain management  Plan:   Left supraclavicular block has resolved appropriately with no residual deficits  Decrease frequency of acetaminophen 650 mg p o  to every 8 hours as needed for mild pain  Continue hydrocodone-acetaminophen 5-325 mg p o  every 6 hours as needed for moderate pain  Discontinue intravenous morphine  Discontinue naproxen as patient is on anticoagulation with apixaban  Bowel regimen per primary team to avoid opioid-induced constipation  Patient was instructed to avoid drinking alcohol while on opioids    APS will sign off at this time  Thank you for the consult  All opioids and other analgesics to be written at discretion of primary team  Please contact Acute Pain Service - [facility] via [employer] from [phone] with additional questions or concerns   See Raghavendra or Lexi for additional contacts and after hours information  Pain History  Current pain location(s): L hand  Pain Scale:   0-4  Quality: sore  24 hour history: Patient underwent procedures as above  Patient has remained largely hemodynamically stable with some hypertension at times and some bradycardia  Patient is remained afebrile and is not requiring supplemental oxygen  Most recent laboratory studies significant for creatinine 0 97, AST 19, ALT 13, alkaline phosphatase 60, total bilirubin 0 73, platelets 645, INR 4 78, PTT 31  Chest x-ray demonstrates elevation of left hemidiaphragm with subsegmental atelectasis of the left lung base  Patient reports adequate pain control with current regimen  He states that he feels that his nerve block is worn off and denies any numbness at this time  Denies any shortness of breath  Opioid requirement previous 24 hours:   Fentanyl 100 mcg intravenous  Hydrocodone 15 mg p o  Meds/Allergies   all current active meds have been reviewed    Allergies   Allergen Reactions   • Demerol [Meperidine]      Unknown reaction        Objective     Temp:  [97 3 °F (36 3 °C)-98 6 °F (37 °C)] 98 6 °F (37 °C)  HR:  [55-69] 69  Resp:  [13-20] 18  BP: (105-171)/(66-81) 126/67    Physical Exam  Vitals and nursing note reviewed  Constitutional:       General: He is not in acute distress  Appearance: Normal appearance  He is not ill-appearing or toxic-appearing  HENT:      Head: Normocephalic and atraumatic  Eyes:      General: No scleral icterus  Conjunctiva/sclera: Conjunctivae normal    Cardiovascular:      Rate and Rhythm: Normal rate  Pulmonary:      Effort: Pulmonary effort is normal  No respiratory distress  Comments: RA  Musculoskeletal:         General: Tenderness present  Skin:     General: Skin is warm and dry  Neurological:      Mental Status: He is alert        Comments: Awake, alert and oriented to person place time situation  POSS 1  No sensory or motor deficits noted in the left upper extremity though physical exam was limited secondary to bandage   Psychiatric:         Thought Content: Thought content normal            Lab Results:   Results from last 7 days   Lab Units 01/04/23  0422   WBC Thousand/uL 7 08   HEMOGLOBIN g/dL 12 5   HEMATOCRIT % 39 5   PLATELETS Thousands/uL 212      Results from last 7 days   Lab Units 01/04/23  0422   POTASSIUM mmol/L 4 7   CHLORIDE mmol/L 104   CO2 mmol/L 29   BUN mg/dL 18   CREATININE mg/dL 0 97   CALCIUM mg/dL 8 5   ALK PHOS U/L 60   ALT U/L 13   AST U/L 19       Imaging Studies: I have personally reviewed pertinent reports  EKG, Pathology, and Other Studies: I have personally reviewed pertinent reports  Please note that the APS provides consultative services regarding pain management only  With the exception of ketamine, peripheral nerve catheters, and epidural infusions (and except when indicated), final decisions regarding starting or changing doses of analgesic medications are at the discretion of the consulting service  Off hours consultation and/or medication management is generally not available      Mango Cazares MD  Acute Pain Service

## 2023-01-05 ENCOUNTER — TELEPHONE (OUTPATIENT)
Dept: OBGYN CLINIC | Facility: CLINIC | Age: 73
End: 2023-01-05

## 2023-01-05 NOTE — TELEPHONE ENCOUNTER
Post op scheduled   left long and index finger metacarpal fractures and long finger proximal phalanx fracture status post ORIF

## 2023-01-16 ENCOUNTER — OFFICE VISIT (OUTPATIENT)
Dept: OBGYN CLINIC | Facility: CLINIC | Age: 73
End: 2023-01-16

## 2023-01-16 ENCOUNTER — APPOINTMENT (OUTPATIENT)
Dept: RADIOLOGY | Facility: CLINIC | Age: 73
End: 2023-01-16

## 2023-01-16 ENCOUNTER — OFFICE VISIT (OUTPATIENT)
Dept: OCCUPATIONAL THERAPY | Facility: CLINIC | Age: 73
End: 2023-01-16

## 2023-01-16 VITALS
DIASTOLIC BLOOD PRESSURE: 82 MMHG | WEIGHT: 208 LBS | BODY MASS INDEX: 29.12 KG/M2 | HEIGHT: 71 IN | SYSTOLIC BLOOD PRESSURE: 130 MMHG

## 2023-01-16 DIAGNOSIS — Z48.89 AFTERCARE FOLLOWING SURGERY: Primary | ICD-10-CM

## 2023-01-16 DIAGNOSIS — Z48.89 AFTERCARE FOLLOWING SURGERY: ICD-10-CM

## 2023-01-16 DIAGNOSIS — S62.242A CLOSED DISPLACED FRACTURE OF SHAFT OF FIRST METACARPAL BONE OF LEFT HAND, INITIAL ENCOUNTER: ICD-10-CM

## 2023-01-16 DIAGNOSIS — T14.8XXA CRUSH INJURY: Primary | ICD-10-CM

## 2023-01-16 NOTE — PROGRESS NOTES
Orthosis    Diagnosis:   1  Crush injury        2  Closed displaced fracture of shaft of first metacarpal bone of left hand, initial encounter  Ambulatory Referral to PT/OT Hand Therapy        Indication: Fracture    Location: Left  index finger and long finger  Supplies: Custom Fit Orthotic and Skin coverage   Orthosis type: HB Radial gutter  Wearing Schedule: Remove for hygiene only and Remove for HEP  Describe Position: intrinsic plus     Precautions: Fracture    Patient or Caregiver expresses understanding of wearing Schedule and Precautions? Yes  Patient or Caregiver able to don/doff orthotic independently? Yes    Written orders provided to patient? Yes  Orders Obtained: Verbal  Orders Obtained from: Lorne Valentine    Return for evaluation and treatment Yes at [facility]   HEP given

## 2023-01-16 NOTE — PROGRESS NOTES
Assessment:   S/P Irrigation and debridement of the left hand open index and long finger metacarpal fractures  (Skin, subcutaneous tissue, muscle, fascia, and bone)  Irrigation and debridement skin, subcutaneous tissue, fascia and tendon left hand ring finger proximal phalanx wound  - Left, Open reduction and internal fixation left hand index and long finger metacarpal fractures  Open reduction and internal fixation left hand long finger proximal phalanx fracture - Left, and Application short arm splint - Left on 1/3/2023    Plan:   Patient was advised to attend OT to obtain a hand-based radial gutter splint for protection  He was advised that he can work on gentle motion of the digits  X-rays were reviewed with the patient in detail  He was advised for wound care to apply dry dressings over the wounds and to keep it as dry as possible as there was an area of maceration noted over the index finger  He has finished his antibiotics at this time  He will follow-up in 4 weeks for reevaluation left hand and long finger    Follow Up:  4  week(s)    To Do Next Visit:  Reevaluation and x-rays of left hand and long finger      CHIEF COMPLAINT:  Chief Complaint   Patient presents with   • Left Hand - Post-op     Index and long finger- 1/3/23         SUBJECTIVE:  Kosta Bobby is a 74 y o  male who presents for follow up after Irrigation and debridement of the left hand open index and long finger metacarpal fractures  (Skin, subcutaneous tissue, muscle, fascia, and bone)  Irrigation and debridement skin, subcutaneous tissue, fascia and tendon left hand ring finger proximal phalanx wound  - Left, Open reduction and internal fixation left hand index and long finger metacarpal fractures  Open reduction and internal fixation left hand long finger proximal phalanx fracture - Left, and Application short arm splint - Left on 1/3/2023  Today patient has minimal pain and discomfort    He states that he has finished taking his antibiotics as prescribed  PHYSICAL EXAMINATION:  Vital signs: Ht 5' 11" (1 803 m)   Wt 94 3 kg (208 lb)   BMI 29 01 kg/m²   General: well developed and well nourished, alert, oriented times 3 and appears comfortable  Psychiatric: Normal    MUSCULOSKELETAL EXAMINATION:  Incision: Clean, dry, intact  Range of Motion: As expected and Limited due to stiffness  Neurovascular status: Neuro intact, good cap refill  Activity Restrictions: No lifting with the left upper extremity  Done today: Sutures out      STUDIES REVIEWED:  X-rays of the left hand demonstrated hardware in appropriate alignment of the index and long finger metacarpals and long finger proximal phalanx  No fracture displacement  No loosening of hardware        PROCEDURES PERFORMED:  Procedures  No Procedures performed today

## 2023-01-25 ENCOUNTER — EVALUATION (OUTPATIENT)
Dept: OCCUPATIONAL THERAPY | Facility: CLINIC | Age: 73
End: 2023-01-25

## 2023-01-25 DIAGNOSIS — S62.242D CLOSED DISPLACED FRACTURE OF SHAFT OF FIRST METACARPAL BONE OF LEFT HAND WITH ROUTINE HEALING, SUBSEQUENT ENCOUNTER: Primary | ICD-10-CM

## 2023-01-25 DIAGNOSIS — T14.8XXA CRUSH INJURY: ICD-10-CM

## 2023-01-25 DIAGNOSIS — S62.242A CLOSED DISPLACED FRACTURE OF SHAFT OF FIRST METACARPAL BONE OF LEFT HAND, INITIAL ENCOUNTER: ICD-10-CM

## 2023-01-25 NOTE — PROGRESS NOTES
OT Evaluation     Today's date: 2023  Patient name: Jennifer Parr  : 1950  MRN: [id number]  Referring provider: Darrel Heath MD  Dx:   Encounter Diagnosis     ICD-10-CM    1  Closed displaced fracture of shaft of first metacarpal bone of left hand with routine healing, subsequent encounter  S62 242D       2  Closed displaced fracture of shaft of first metacarpal bone of left hand, initial encounter  S62 242A       3  Crush injury  T14  8XXA                      Assessment  Assessment details: Patient presenting to OP OT services with a dx left hand crush injury sustained on 2023 when his hand was struck by a log splitter  Patient had I&D completed on 2023  Patient had recent follow-up with Ortho on 2023 and was placed in an HB radial gutter fabricated by OT  Patient reports sustaining fracture on left hand when he was splitting logs  Patient has follow-up wit Ortho on 02/15/2023  Patient is right hand dominant  As per ED note: 74 y o  male who presented to the ED after a crush injury to the left hand from a log splitter sustaining open long finger metacarpal fracture, open index finger metacarpal fracture, and open proximal phalanx fracture of the long finger  Pt was admitted to the orthopaedic service and taken to the OR on 1/3/23 for Left hand I&D, ORIF index and long finger Metacarpal fx, long finger proximal phalanx fx and wound closure  Prior to surgery the risks and benefits of surgery were explained and informed consent was obtained  Surgery went without complications and pt was discharged to the PACU in a stable condition and was transferred to the floor  On discharge date pt was cleared by PT and the medicine team and determined to be safe for discharge      Impairments: abnormal or restricted ROM, activity intolerance and impaired physical strength  Other impairment: functional use    Symptom irritability: moderateUnderstanding of Dx/Px/POC: good   Prognosis: good  Prognosis details: STGs    Pt will increase  strength by 5-10#  - Not Met    Pt will increase digit ROM by 50%  - Not Met    Pt will demonstrate decrease in edema by 25%  - Not Met    Pt will report a decrease in sensation deficits by 25%  - Not Met    Independent with HEP  - Not Met    Pt will D/C splint wear  - Not Met    LTGs     Pt will increase  strength by an additional 5-10#  - Not Met    Pt will increase digit ROM to WNL  - Not Met    Pt will demonstrate decrease in edema by 50%  - Not Met    Pt will increase pinch strength by 3-5#  - Not Met    Pt will report an increase in ADL/IADL participation  - Not Met    Pt will report a decrease in sensation deficits by 50%  - Not Met            Plan  Plan details: Patient has presenting to OP OT services with a dx of left crush injury  Patient demonstrating increased pain, decreased strength, decreased ROM and decreased activity  Pt would benefit from continued Occupational Therapy services two times per week for 4 weeks to return to prior level of function and achieve all established goals   Thank you for the referral!    Patient would benefit from: custom splinting, OT eval and skilled occupational therapy  Referral necessary: Yes  Planned modality interventions: cryotherapy, ultrasound, unattended electrical stimulation and thermotherapy: hydrocollator packs  Planned therapy interventions: manual therapy, massage, patient education, self care, strengthening, stretching, therapeutic activities, fine motor coordination training, home exercise program, functional ROM exercises and therapeutic exercise  Frequency: 2x week  Duration in visits: 8  Duration in weeks: 4  Treatment plan discussed with: patient        Subjective Evaluation    History of Present Illness  Date of onset: 1/2/2023  Date of surgery: 1/3/2023  Mechanism of injury: surgery and trauma  Mechanism of injury: S/p crush injury of left hand          Not a recurrent problem   Quality of life: good    Pain  Current pain ratin  At best pain ratin  At worst pain rating: 3  Location: Left Hand  Quality: dull ache    Social Support  Lives with: spouse    Employment status: not working  Hand dominance: right      Diagnostic Tests  X-ray: abnormal  Treatments  Current treatment: immobilization and occupational therapy  Patient Goals  Patient goals for therapy: decreased edema, decreased pain, increased motion, increased strength and independence with ADLs/IADLs          Objective     Observations     Left Wrist/Hand   Positive for incision  Additional Observation Details  Patient presenting with a 5 cm incision along dorsal 2nd and 3rd metacarpal and 2 cm along IP of 4th digit on the dorsal aspect  Patient presenting with steri-stripes along both incisions       Neurological Testing     Sensation     Wrist/Hand   Left   Diminished: light touch    Right   Intact: light touch    Additional Neurological Details  Patient reports sensation deficits along dorsal aspect of left hand along 2nd and 3rd metacarpal    Active Range of Motion     Left Wrist   Wrist flexion: 30 degrees   Wrist extension: 15 degrees   Radial deviation: 10 degrees   Ulnar deviation: 15 degrees     Right Wrist   Normal active range of motion    Left Thumb   Opposition: Unable to complete    Right Thumb   Opposition: WNL    Left Digits    Flexion   Index     MCP: 30    PIP: 30    DIP: 10  Middle     MCP: 30    PIP: 35    DIP: 10  Ring     MCP: 40    PIP: 40    DIP: 10  Little     MCP: 40    PIP: 40    DIP: 10    Additional Active Range of Motion Details  Patient demonstrating with decreased left wrist ROM compared to norms  Soft fist with 8 cm from tips of digit to [facility]    Strength/Myotome Testing     Left Wrist/Hand   Wrist extension: 2-  Wrist flexion: 2-  Radial deviation: 2-  Ulnar deviation: 2-     (2nd hand position)     Trial 1: 0    Thumb Strength  Key/Lateral Pinch     Trial 1: 0    Right Wrist/Hand   Normal wrist strength     (2nd hand position)     Trial 1: 55    Thumb Strength   Key/Lateral Pinch     Trial 1: 14    Additional Strength Details  Left  and pinch strength not tested at this time    Swelling     Left Wrist/Hand   Middle     Proximal: 8 7 cm    Middle: 8 7 cm    Distal: 6 2 cm  Circumference MCP: 22 2 cm  Circumference wrist: 17 7 cm    Additional Swelling Details  Increased swelling in left hand as compared to right    Right Wrist/Hand   Middle     Proximal: 7 cm    Middle: 6 5 cm    Distal: 5 2 cm  Circumference MCP: 21 2 cm  Circumference wrist: 17 5 cm  Neuro Exam:     Functional outcomes   Left 9 peg hole test: 0 (seconds)  Right 9 hole peg test: 24 79 (seconds)       Patient tolerated session well  Patient and therapist discussed exercises as per initial HEP  Patient verbalized understanding of education and demonstrated proper technique  Therapist will make increases as tolerated   Continue with POC            Precautions s/p Left Hand Crush  Initial Evaluation completed on: 01/25/2023  Re-Evaluation needs to be completed before: 02/25/2023  Insurance: Medicare  FOTO: 01/25/2023  Auth Visits: N/A          Manuals 01/25/2023       Scar Massage  Wound Care Performed       Digit Extension Stretch        Composite Fist Stretch        Edema Massage Performed               Neuro Re-Ed  01/25/2023                                       Ther Ex 01/25/2023       Blocking  DIP  PIP  MCP        TGE Hand Open/Close  HEP       Opposition HEP       FDS Glide        Digi-Flex        Theraputty  Grasps  Pinches        Hand Power Pro                Ther Activity 01/25/2023       Tension Pin Pom Pom        Grooved Peg Board                                Modalities 01/25/2023       Ultrasound        CP Performed at end of session       [address] Performed prior to manual

## 2023-01-30 ENCOUNTER — OFFICE VISIT (OUTPATIENT)
Dept: OCCUPATIONAL THERAPY | Facility: CLINIC | Age: 73
End: 2023-01-30

## 2023-01-30 DIAGNOSIS — S62.242A CLOSED DISPLACED FRACTURE OF SHAFT OF FIRST METACARPAL BONE OF LEFT HAND, INITIAL ENCOUNTER: ICD-10-CM

## 2023-01-30 DIAGNOSIS — S62.242D CLOSED DISPLACED FRACTURE OF SHAFT OF FIRST METACARPAL BONE OF LEFT HAND WITH ROUTINE HEALING, SUBSEQUENT ENCOUNTER: Primary | ICD-10-CM

## 2023-01-30 NOTE — PROGRESS NOTES
Daily Note     Today's date: 2023  Patient name: Grupo Syed  : 1950  MRN: [id number]  Referring provider: Spring Archer MD  Dx:   Encounter Diagnosis     ICD-10-CM    1  Closed displaced fracture of shaft of first metacarpal bone of left hand with routine healing, subsequent encounter  S62 242D       2  Closed displaced fracture of shaft of first metacarpal bone of left hand, initial encounter  S62 242A                      Subjective: "I do that at home "      Objective: See treatment diary below      Assessment: Tolerated treatment well  Patient exhibited good technique with therapeutic exercises and would benefit from continued OT  Patient reports compliance with HEP and manual massage to address symptoms  Patient demonstrating with decreased swelling as compared to previous session and increased wound healing  Steri-stripes still present  Plan: Continue per plan of care  Progress treatment as tolerated           Precautions s/p Left Hand Crush  Initial Evaluation completed on: 2023  Re-Evaluation needs to be completed before: 2023  Insurance: Medicare  FOTO: 2023  Auth Visits: N/A          Manuals 2023      Scar Massage  Wound Care Performed Performed      Digit Extension Stretch        Composite Fist Stretch  Gentle 20 sec x 3      Edema Massage Performed Performed              Neuro Re-Ed  2023                                      Ther Ex 2023      Blocking  DIP  PIP  MCP        TGE Hand Open/Close  HEP Hand Open/Close  X 20      Opposition HEP X 10      Finger Adb/add  X 20      Digit Extension  X 20      FDS Glide        Digi-Flex        Theraputty  Grasps  Pinches        Hand Power Pro                Ther Activity 2023      Tension Pin Pom Pom        Grooved Peg Board                                Modalities 2023      Ultrasound        CP Performed at end of session Performed at end of session      [address] Performed prior to manual Performed prior to manual

## 2023-02-03 NOTE — PROGRESS NOTES
Daily Note     Today's date: 2/3/2023  Patient name: Conrad Weston  : 1950  MRN: [id number]  Referring provider: Yuliya Lee MD  Dx:   Encounter Diagnosis     ICD-10-CM    1  Closed displaced fracture of shaft of first metacarpal bone of left hand with routine healing, subsequent encounter  V79 [id number]                      Subjective: I do my exercises at home  Objective: See treatment diary below      Assessment: Tolerated treatment well  Patient demonstrated fatigue post treatment, exhibited good technique with therapeutic exercises and would benefit from continued OT  Provided pt with large edema glove, instructed pt in same  Mod swelling noted  Mod amount of devitalized tissue removed  No open areas noted or s/s of infection  AAROM for opposition, pre tx unable to oppose thumb to any digit  Post tx pt able to oppose thumb to IF,MF, and RF  Pain 4-5  Pt demonstrated good knowledge of HEP  Plan: Continue per plan of care  Progress treatment as tolerated         Precautions s/p Left Hand Crush  Initial Evaluation completed on: 2023  Re-Evaluation needs to be completed before: 2023  Insurance: Medicare  FOTO: 2023  Auth Visits: N/A          Manuals 2023     Scar Massage  Wound Care Performed Performed Performed     Digit Extension Stretch   Performed     Composite Fist Stretch  Gentle 20 sec x 3 Gentle 20 sec  X 3     Edema Massage Performed Performed performed     Wrist and digit PROM   performed     Neuro Re-Ed  2023                                     Ther Ex 2023     Blocking  DIP  PIP  MCP        TGE Hand Open/Close  HEP Hand Open/Close  X 20 Hand Open/Close  X 20     Opposition HEP X 10 X 10     Finger Adb/add  X 20 X 20     Digit Extension  X 20 X 20     FDS Glide        Digi-Flex        Theraputty  Grasps  Pinches        Hand Power Pro                Ther Activity 2023 2/6/2023     Tension Pin Pom Pom        Grooved Peg Board                                Modalities 01/25/2023 01/30/2023 2/6/2023     Ultrasound        CP Performed at end of session Performed at end of session      [address] Performed prior to manual Performed prior to manual Performed prior to manual

## 2023-02-06 ENCOUNTER — OFFICE VISIT (OUTPATIENT)
Dept: OCCUPATIONAL THERAPY | Facility: CLINIC | Age: 73
End: 2023-02-06

## 2023-02-06 DIAGNOSIS — S62.242D CLOSED DISPLACED FRACTURE OF SHAFT OF FIRST METACARPAL BONE OF LEFT HAND WITH ROUTINE HEALING, SUBSEQUENT ENCOUNTER: Primary | ICD-10-CM

## 2023-02-13 ENCOUNTER — OFFICE VISIT (OUTPATIENT)
Dept: OCCUPATIONAL THERAPY | Facility: CLINIC | Age: 73
End: 2023-02-13

## 2023-02-13 DIAGNOSIS — S62.242D CLOSED DISPLACED FRACTURE OF SHAFT OF FIRST METACARPAL BONE OF LEFT HAND WITH ROUTINE HEALING, SUBSEQUENT ENCOUNTER: Primary | ICD-10-CM

## 2023-02-13 NOTE — PROGRESS NOTES
Daily Note     Today's date: 2023  Patient name: Linh Bernabe  : 1950  MRN: [id number]  Referring provider: Amirah Barriga MD  Dx:   Encounter Diagnosis     ICD-10-CM    1  Closed displaced fracture of shaft of first metacarpal bone of left hand with routine healing, subsequent encounter  R15 175O                      Subjective: That glove seemed to help  Objective: See treatment diary below      Assessment: Tolerated treatment well  Patient demonstrated fatigue post treatment, exhibited good technique with therapeutic exercises and would benefit from continued OT  Pre tx pt only able to oppose thumb to IF, post tx pt able to oppose thumb to side of SF  Pt could comfortably oppose thumb to IF,MF and RF post tx  Pt able to  poms in opposition  Post tx pt able to achieve 1/2 fist closure  Digits and wrist really tight pre tx, decreased slightly post tx  Encouraged pt to attend therapy twice a week, pt only wants to attend once a week  Swelling decreased since previous tx session, pitting edema still present primarily dorsal aspect of hand and digits  Pt to see MD await new orders  Plan: Continue per plan of care  Progress treatment as tolerated         Precautions s/p Left Hand Crush  Initial Evaluation completed on: 2023  Re-Evaluation needs to be completed before: 2023  Insurance: Medicare  FOTO: 2023  Auth Visits: N/A          Manuals 2023    Scar Massage  Wound Care Performed Performed Performed Performed    Digit Extension Stretch   Performed performed    Composite Fist Stretch  Gentle 20 sec x 3 Gentle 20 sec  X 3 performed    Edema Massage Performed Performed performed performed    Wrist and digit PROM   performed performed    Neuro Re-Ed  2023                                    Ther Ex 2023    Blocking  DIP  PIP  MCP        TGE Hand Open/Close  HEP Hand Open/Close  X 20 Hand Open/Close  X 20 Flat roof  X 20    Opposition HEP X 10 X 10 X 10    Finger Adb/add  X 20 X 20 X 20    Digit Extension  X 20 X 20 X 20    FDS Glide    X 10    Digi-Flex    Yellow and Blue Foam post stretch x 10    Theraputty  Grasps  Pinches        Hand Power Pro                Ther Activity 01/25/2023 01/30/2023 2/6/2023 2/13/2023    Tension Pin Pom Pom    YTP picked up all poms    Grooved Peg Board        poms    Picked up in opposition                    Modalities 01/25/2023 01/30/2023 2/6/2023 2/13/2023    Ultrasound        CP Performed at end of session Performed at end of session      [facility] Performed prior to manual Performed prior to manual Performed prior to manual Performed after manual coban digits in flexion

## 2023-02-15 ENCOUNTER — HOSPITAL ENCOUNTER (OUTPATIENT)
Dept: RADIOLOGY | Facility: HOSPITAL | Age: 73
Discharge: HOME | End: 2023-02-15
Attending: ORTHOPAEDIC SURGERY

## 2023-02-15 ENCOUNTER — OFFICE VISIT (OUTPATIENT)
Dept: OBGYN CLINIC | Facility: HOSPITAL | Age: 73
End: 2023-02-15

## 2023-02-15 VITALS
SYSTOLIC BLOOD PRESSURE: 132 MMHG | BODY MASS INDEX: 29.12 KG/M2 | HEIGHT: 71 IN | WEIGHT: 208 LBS | HEART RATE: 71 BPM | DIASTOLIC BLOOD PRESSURE: 85 MMHG

## 2023-02-15 DIAGNOSIS — S62.242A CLOSED DISPLACED FRACTURE OF SHAFT OF FIRST METACARPAL BONE OF LEFT HAND, INITIAL ENCOUNTER: ICD-10-CM

## 2023-02-15 DIAGNOSIS — Z48.89 AFTERCARE FOLLOWING SURGERY: Primary | ICD-10-CM

## 2023-02-15 DIAGNOSIS — Z48.89 AFTERCARE FOLLOWING SURGERY: ICD-10-CM

## 2023-02-15 NOTE — PROGRESS NOTES
Assessment:   S/P Irrigation and debridement of the left hand open index and long finger metacarpal fractures  (Skin, subcutaneous tissue, muscle, fascia, and bone)  Irrigation and debridement skin, subcutaneous tissue, fascia and tendon left hand ring finger proximal phalanx wound  - Left, Open reduction and internal fixation left hand index and long finger metacarpal fractures  Open reduction and internal fixation left hand long finger proximal phalanx fracture - Left, and Application short arm splint - Left on 1/3/2023    Plan:   The patient the patient was advised to continue with occupational therapy to work on aggressive finger range of motion  He has no restrictions  The importance of regaining composite fist formation was discussed  He expressed understanding  Follow Up:  6  week(s)    To Do Next Visit:  X-rays of the  left  hand      CHIEF COMPLAINT:  Chief Complaint   Patient presents with   • Left Hand - Post-op         SUBJECTIVE:  Jean Smith is a 74 y o  male who presents for follow up after Irrigation and debridement of the left hand open index and long finger metacarpal fractures  (Skin, subcutaneous tissue, muscle, fascia, and bone)  Irrigation and debridement skin, subcutaneous tissue, fascia and tendon left hand ring finger proximal phalanx wound  - Left, Open reduction and internal fixation left hand index and long finger metacarpal fractures  Open reduction and internal fixation left hand long finger proximal phalanx fracture - Left, and Application short arm splint - Left on 1/3/2023  Today patient has Stiffness/LROM         PHYSICAL EXAMINATION:  Vital signs: /85   Pulse 71   Ht 5' 11" (1 803 m)   Wt 94 3 kg (208 lb)   BMI 29 01 kg/m²   General: well developed and well nourished, alert, oriented times 3 and appears comfortable  Psychiatric: Normal    MUSCULOSKELETAL EXAMINATION:  Incision: healed  Range of Motion: Limited due to stiffness  Neurovascular status: Neuro intact, good cap refill  Activity Restrictions: No restrictions         STUDIES REVIEWED:  Images were reviewed in PACS by Dr Deya Rodriguez and demonstrate: xray of left hand on 2/15/2023 demonstrates routine healing of left index and long finger metacarpal fractures and long finger proximal phalanx without evidence of hardware complications         PROCEDURES PERFORMED:  Procedures  No Procedures performed today   Scribe Attestation    I,:  Fred Joseph am acting as a scribe while in the presence of the attending physician :       I,:  Chema Bustillo MD personally performed the services described in this documentation    as scribed in my presence :

## 2023-02-17 NOTE — PROGRESS NOTES
Daily Note     Today's date: 2023  Patient name: Ana Chiu  : 1950  MRN: [id number]  Referring provider: Shoaib Alvarado MD  Dx:   Encounter Diagnosis     ICD-10-CM    1  Closed displaced fracture of shaft of first metacarpal bone of left hand with routine healing, subsequent encounter  H84 180C                      Subjective: The doctor said everything is good  I have no restrictions  Objective: See treatment diary below      Assessment: Tolerated treatment well  Patient demonstrated fatigue post treatment, exhibited good technique with therapeutic exercises and would benefit from continued OT  Pt tolerated increase tension pin resistance  Increase opposition noted post tx, able to oppose to RF  L wrist and digits very tight, pt reports doing exercises at home and tries to use his hand  Provided pt with mediums compression glove for left hand, encouraged pt to not wear it all the time  Post tx 1/2 fist closure noted  Plan: Continue per plan of care  Progress treatment as tolerated         Precautions s/p Left Hand Crush  Initial Evaluation completed on: 2023  Re-Evaluation needs to be completed before: 2023  Insurance: Medicare  FOTO: 2023  Auth Visits: N/A          Manuals 2023   Scar Massage  Wound Care Performed Performed Performed Performed performed   Digit Extension Stretch   Performed performed performed   Composite Fist Stretch  Gentle 20 sec x 3 Gentle 20 sec  X 3 performed performed   Edema Massage Performed Performed performed performed performed   Wrist and digit PROM   performed performed performed   Neuro Re-Ed  2023                                   Ther Ex 2023   Blocking  DIP  PIP  MCP        TGE Hand Open/Close  HEP Hand Open/Close  X 20 Hand Open/Close  X 20 Flat roof  X 20 Flat Roof  X 20   Opposition HEP X 10 X 10 X 10 X 10   Finger Adb/add  X 20 X 20 X 20 X 20   Digit Extension  X 20 X 20 X 20 X 20   FDS Glide    X 10 X 10   Digi-Flex    Yellow and Blue Foam post stretch x 10 Yellow and Blue Foam post stretch x 10   Theraputty  Grasps  Pinches        Hand Power Pro                Ther Activity 01/25/2023 01/30/2023 2/6/2023 2/13/2023 2/20/2023   Tension Pin Pom Pom    YTP picked up all poms Y and RTP up all poms   Grooved Peg Board        poms    Picked up in opposition Picked up in opposition                   Modalities 01/25/2023 01/30/2023 2/6/2023 2/13/2023 2/20/2023   Ultrasound        CP Performed at end of session Performed at end of session      [address] Performed prior to manual Performed prior to manual Performed prior to manual Performed after manual coban digits in flexion Performed after manual w/digits in flexion

## 2023-02-20 ENCOUNTER — OFFICE VISIT (OUTPATIENT)
Dept: OCCUPATIONAL THERAPY | Facility: CLINIC | Age: 73
End: 2023-02-20

## 2023-02-20 DIAGNOSIS — S62.242D CLOSED DISPLACED FRACTURE OF SHAFT OF FIRST METACARPAL BONE OF LEFT HAND WITH ROUTINE HEALING, SUBSEQUENT ENCOUNTER: Primary | ICD-10-CM

## 2023-02-21 NOTE — PROGRESS NOTES
Daily Note     Today's date: 2023  Patient name: Eliazar Mast  : 1950  MRN: [id number]  Referring provider: Ruth Ramírez MD  Dx:   Encounter Diagnosis     ICD-10-CM    1  Closed displaced fracture of shaft of first metacarpal bone of left hand with routine healing, subsequent encounter  S48 351C                      Subjective: It's really stiff  Objective: See treatment diary below      Assessment: Tolerated treatment well  Patient demonstrated fatigue post treatment, exhibited good technique with therapeutic exercises and would benefit from continued OT  Pt tolerated additional AROM exercises and stretches  Pitting edema on dorsal aspect of hand over MCP's  Slight increase in wrist AROM and digit flexion post tx  Plan: Continue per plan of care  Progress treatment as tolerated         Precautions s/p Left Hand Crush  Initial Evaluation completed on: 2023  Re-Evaluation needs to be completed before: 2023  Insurance: Medicare  FOTO: 2023  Auth Visits: N/A          Manuals 2023   Scar Massage  Wound Care performed   Performed performed   Digit Extension Stretch performed   performed performed   Composite Fist Stretch performed   performed performed   Edema Massage performed   performed performed   Wrist and digit PROM performed   performed performed   Neuro Re-Ed  2023                                   Ther Ex 2023   Blocking  DIP  PIP  MCP   X 10  X 10  X 10       TGE   X 20   Flat roof  X 20 Flat Roof  X 20   Opposition X 10   X 10 X 10   Finger Adb/add    X 20 X 20   Digit Extension X 20   X 20 X 20   FDS Glide    X 10 X 10   Digi-Flex Yellow x 20  Red x 20   Yellow and Blue Foam post stretch x 10 Yellow and Blue Foam post stretch x 10   Theraputty  Grasps  Pinches        Hand Power Pro        Flex bar  Bends   X 15       Prayer stretch        Wrist AROM All directions   x 10 Ther Activity 2/22/2023 2/13/2023 2/20/2023   Tension Pin Pom Pom RTP  all poms   YTP picked up all poms Y and RTP up all poms   Grooved Peg Board        poms    Picked up in opposition Picked up in opposition   [place] w/beads Picked up in opposition x 2       [facility] board Supination x 2       Modalities 2/22/2023 2/13/2023 2/20/2023   Ultrasound        CP        MH    Performed after manual coban digits in flexion Performed after manual w/digits in flexion

## 2023-02-22 ENCOUNTER — OFFICE VISIT (OUTPATIENT)
Dept: OCCUPATIONAL THERAPY | Facility: CLINIC | Age: 73
End: 2023-02-22

## 2023-02-22 DIAGNOSIS — S62.242D CLOSED DISPLACED FRACTURE OF SHAFT OF FIRST METACARPAL BONE OF LEFT HAND WITH ROUTINE HEALING, SUBSEQUENT ENCOUNTER: Primary | ICD-10-CM

## 2023-02-24 NOTE — PROGRESS NOTES
Daily Note     Today's date: 2023  Patient name: Nicole Grullon  : 1950  MRN: [id number]  Referring provider: Armando Larsen MD  Dx:   Encounter Diagnosis     ICD-10-CM    1  Closed displaced fracture of shaft of first metacarpal bone of left hand with routine healing, subsequent encounter  X60 [id number]                      Subjective: Oh that hurts  Objective: See treatment diary below      Assessment: Tolerated treatment well  Patient demonstrated fatigue post treatment, exhibited good technique with therapeutic exercises and would benefit from continued OT  Reassessment completed by OTR, refer to same for details  Plan: Continue per plan of care  Progress treatment as tolerated         Precautions s/p Left Hand Crush  Initial Evaluation completed on: 2023  Re-Evaluation needs to be completed before: 2023  Insurance: Medicare  FOTO: 2023  Auth Visits: N/A          Manuals 2023   Scar Massage  Wound Care performed performed  Performed performed   Digit Extension Stretch performed performed  performed performed   Composite Fist Stretch performed performed  performed performed   Edema Massage performed performed  performed performed   Wrist and digit PROM performed performed  performed performed   Neuro Re-Ed  2023                                   Ther Ex 2023   Blocking  DIP  PIP  MCP   X 10  X 10  X 10   X 10  X 10  X 10      TGE   X 20   X 20  Flat roof  X 20 Flat Roof  X 20   Opposition X 10 X 10  X 10 X 10   Finger Adb/add    X 20 X 20   Digit Extension X 20 X 20  X 20 X 20   FDS Glide    X 10 X 10   Digi-Flex Yellow x 20  Red x 20 Yellow x 20  Red x 20  Yellow and Blue Foam post stretch x 10 Yellow and Blue Foam post stretch x 10   Theraputty  Grasps  Pinches        Hand Power Pro        Flex bar  Bends   X 15   X 15      Prayer stretch  20 sec x 4      Towel scrunches        Wrist AROM All directions   x 10 All directions  X 15      Ther Activity 2/22/2023 2/27/2023 2/13/2023 2/20/2023   Tension Pin Pom Pom RTP  all poms RTP  all poms  YTP picked up all poms Y and RTP up all poms   Grooved Peg Board        poms    Picked up in opposition Picked up in opposition   [place] w/beads Picked up in opposition x 2 Picked up in opposition x 2      [facility] board Supination x 2 Supination x 2      Modalities 2/22/2023 2/27/2023 2/13/2023 2/20/2023   Ultrasound        CP        MH  Performed post tx with digits in flexion  Performed after manual coban digits in flexion Performed after manual w/digits in flexion

## 2023-02-27 ENCOUNTER — EVALUATION (OUTPATIENT)
Dept: OCCUPATIONAL THERAPY | Facility: CLINIC | Age: 73
End: 2023-02-27

## 2023-02-27 DIAGNOSIS — S62.242D CLOSED DISPLACED FRACTURE OF SHAFT OF FIRST METACARPAL BONE OF LEFT HAND WITH ROUTINE HEALING, SUBSEQUENT ENCOUNTER: Primary | ICD-10-CM

## 2023-02-27 NOTE — PROGRESS NOTES
OT Re-Evaluation     Today's date: 2023  Patient name: Julia Morton  : 1950  MRN: [id number]  Referring provider: Valentino Ta MD  Dx:   Encounter Diagnosis     ICD-10-CM    1  Closed displaced fracture of shaft of first metacarpal bone of left hand with routine healing, subsequent encounter  S62 242D           Start Time: 1300  Stop Time: 1400  Total time in clinic (min): 60 minutes    Assessment  Assessment details: Patient presenting to OP OT services with a dx left hand crush injury sustained on 2023 when his hand was struck by a log splitter  Patient had I&D completed on 2023  Patient had recent follow-up with Ortho on 2023 and was placed in an HB radial gutter fabricated by OT  Patient reports sustaining fracture on left hand when he was splitting logs  Patient has follow-up wit Ortho on 02/15/2023  Patient is right hand dominant  As per ED note: 74 y o  male who presented to the ED after a crush injury to the left hand from a log splitter sustaining open long finger metacarpal fracture, open index finger metacarpal fracture, and open proximal phalanx fracture of the long finger  Pt was admitted to the orthopaedic service and taken to the OR on 1/3/23 for Left hand I&D, ORIF index and long finger Metacarpal fx, long finger proximal phalanx fx and wound closure  Prior to surgery the risks and benefits of surgery were explained and informed consent was obtained  Surgery went without complications and pt was discharged to the PACU in a stable condition and was transferred to the floor  On discharge date pt was cleared by PT and the medicine team and determined to be safe for discharge  Re-assessment completed on 2023  Patient has consistently attended OP OT services since start of care  Patient has made steady progress towards established goals   Patient demonstrating with increases in hand range of motion,  strength, pinch strength, fine motor coordination and decreases in swelling  Patient has not met all established goals and will benefit from continued OT services to maximize level of function  Patient had recent appointment with Ortho on 02/19/2023 with good progress noted  Patient returns to Ortho on 03/29/2023  Routine healing noted throughout incision sites  Impairments: abnormal or restricted ROM, activity intolerance and impaired physical strength  Other impairment: functional use    Symptom irritability: moderateUnderstanding of Dx/Px/POC: good   Prognosis: good  Prognosis details: STGs    Pt will increase  strength by 5-10#  - Met    Pt will increase digit ROM by 50%  - Met    Pt will demonstrate decrease in edema by 25%  - Met    Pt will report a decrease in sensation deficits by 25%  - Met    Independent with HEP  - Met    Pt will D/C splint wear  - Met    LTGs     Pt will increase  strength by an additional 5-10#  - Progressing    Pt will increase digit ROM to WNL  - Progressing    Pt will demonstrate decrease in edema by 50%  - Progressing    Pt will increase pinch strength by 3-5#  - Progressing    Pt will report an increase in ADL/IADL participation  - Progressing    Pt will report a decrease in sensation deficits by 50%   - Progressing            Plan  Plan details:     Patient would benefit from: custom splinting, OT eval and skilled occupational therapy  Referral necessary: Yes  Planned modality interventions: cryotherapy, ultrasound, unattended electrical stimulation and thermotherapy: hydrocollator packs  Planned therapy interventions: manual therapy, massage, patient education, self care, strengthening, stretching, therapeutic activities, fine motor coordination training, home exercise program, functional ROM exercises and therapeutic exercise  Frequency: 2x week  Duration in visits: 8  Duration in weeks: 4  Treatment plan discussed with: patient        Subjective Evaluation    History of Present Illness  Date of onset: 2023  Date of surgery: 1/3/2023  Mechanism of injury: surgery and trauma  Mechanism of injury: S/p crush injury of left hand          Not a recurrent problem   Quality of life: good    Pain  Current pain ratin  At best pain ratin  At worst pain ratin  Location: Left Hand  Quality: dull ache    Social Support  Lives with: spouse    Employment status: not working  Hand dominance: right      Diagnostic Tests  X-ray: abnormal  Treatments  Current treatment: immobilization and occupational therapy  Patient Goals  Patient goals for therapy: decreased edema, decreased pain, increased motion, increased strength and independence with ADLs/IADLs          Objective     Observations     Left Wrist/Hand   Positive for incision  Additional Observation Details  Patient presenting with a 5 cm incision along dorsal 2nd and 3rd metacarpal and 2 cm along IP of 4th digit on the dorsal aspect  Patient presenting with steri-stripes along both incisions       Routine healing noted with minimal scar adhesions    Neurological Testing     Sensation     Wrist/Hand   Left   Diminished: light touch    Right   Intact: light touch    Additional Neurological Details  Patient reports sensation deficits along dorsal aspect of left hand along 2nd and 3rd metacarpal    Improvement in sensation deficits noted since start of care    Active Range of Motion     Left Wrist   Wrist flexion: 35 degrees   Wrist extension: 20 degrees   Radial deviation: 15 degrees   Ulnar deviation: 20 degrees     Right Wrist   Normal active range of motion    Left Thumb   Opposition: WNL    Right Thumb   Opposition: WNL    Left Digits    Flexion   Index     MCP: 50    PIP: 50    DIP: 30  Middle     MCP: 35    PIP: 60    DIP: 35  Ring     MCP: 40    PIP: 60    DIP: 35  Little     MCP: 45    PIP: 55    DIP: 35    Additional Active Range of Motion Details  Patient demonstrating with increased left wrist ROM compared to [facility]  Soft fist with 8 cm from tips of digit to [facility]    Soft fist with 6 cm from tips of digit to [facility] on re-assessment on 02/27/2023    Strength/Myotome Testing     Left Wrist/Hand   Wrist extension: 3-  Wrist flexion: 3-  Radial deviation: 3-  Ulnar deviation: 3-     (2nd hand position)     Trial 1: 5    Thumb Strength  Key/Lateral Pinch     Trial 1: 1    Right Wrist/Hand   Normal wrist strength     (2nd hand position)     Trial 1: 55    Thumb Strength   Key/Lateral Pinch     Trial 1: 14    Additional Strength Details  Increase in  strength since start of care    Swelling     Left Wrist/Hand   Middle     Proximal: 8 7 cm    Middle: 8 7 cm    Distal: 6 2 cm  Circumference MCP: 21 5 cm  Circumference wrist: 19 cm    Additional Swelling Details  Decreased swelling in left hand as compared to [facility]    Right Wrist/Hand   Middle     Proximal: 7 cm    Middle: 6 5 cm    Distal: 5 2 cm  Circumference MCP: 21 2 cm  Circumference wrist: 17 5 cm  Neuro Exam:     Functional outcomes   Left 9 peg hole test: 0 (seconds)  Right 9 hole peg test: 24 79 (seconds)  Functional outcome comment: Re-assessment completed on 02/27/2023  R 49 66

## 2023-03-01 NOTE — PROGRESS NOTES
Daily Note     Today's date: 3/1/2023  Patient name: Alyson Rebolledo  : 1950  MRN: [id number]  Referring provider: Chelsie James MD  Dx:   Encounter Diagnosis     ICD-10-CM    1  Closed displaced fracture of shaft of first metacarpal bone of left hand with routine healing, subsequent encounter  H37 [id number]                      Subjective: Oh that hurts  Objective: See treatment diary below      Assessment: Tolerated treatment well  Patient demonstrated fatigue post treatment and would benefit from continued OT  Pt reports pain 2  Increase active fist closure post tx, able to achieve 1/2-3/4 closure  Tolerated additional progressive exercises well  Mod pitting edema noted dorsal aspect of left hand over MCP's  Wrist movement very tight, decreased post tx  Plan: Continue per plan of care  Progress treatment as tolerated         Precautions s/p Left Hand Crush  Initial Evaluation completed on: 2023  Re-Evaluation needs to be completed before: 2023  Insurance: Medicare  FOTO: 2023  Auth Visits: N/A          Manuals 2023 2023 3/2/2023     Scar Massage performed performed Cupping flexors     Digit Extension Stretch performed performed performed     Composite Fist Stretch performed performed performed     Edema Massage performed performed performed     Intrinsic stretches   performed     Wrist and digit PROM performed performed performed     Neuro Re-Ed  2023 2023 3/2/2023                                     Ther Ex 2023 2023 3/2/2023     Blocking  DIP  PIP  MCP   X 10  X 10  X 10   X 10  X 10  X 10   X 10  X 10  X 10     TGE   X 20   X 20   X 20     Opposition X 10 X 10 X 10     Finger Adb/add   X 10     Digit Extension X 20 X 20 X 20     FDS Glide   X 10     Digi-Flex Yellow x 20  Red x 20 Yellow x 20  Red x 20 Red x 20     Theraputty  Grasps  Pinches   Intrinsic pushes  2 rows     Hand Power Pro        Flex bar  Bends  twist   X 15   X 15 Yellow  X SOCS contacted Ms. Gaffney to offer a sooner appointment date, possibly next week. No answer, left voicemail requesting a return call if she would like to reschedule.    20  X 20     Prayer stretch  20 sec x 4 20 sec x 4     Towel scrunches        Dowel supination stretch   20 sec x 5     Intrinsic plus   Foam x 20     Wrist AROM All directions   x 10 All directions  X 15 All directions  X 15 w/ball     Ther Activity 2/22/2023 2/27/2023 3/2/2023     Tension Pin Pom Pom RTP  all poms RTP  all poms RTP  all poms     Grooved Peg Board        poms        Ball w/beads Picked up in opposition x 2 Picked up in opposition x 2 Picked up in opposition x 2     Cone board Supination x 2 Supination x 2      Modalities 2/22/2023 2/27/2023 3/2/2023     Ultrasound        CP        MH  Performed post tx with digits in flexion Performed post tx with digits in flexion

## 2023-03-02 ENCOUNTER — OFFICE VISIT (OUTPATIENT)
Dept: OCCUPATIONAL THERAPY | Facility: CLINIC | Age: 73
End: 2023-03-02

## 2023-03-02 DIAGNOSIS — S62.242D CLOSED DISPLACED FRACTURE OF SHAFT OF FIRST METACARPAL BONE OF LEFT HAND WITH ROUTINE HEALING, SUBSEQUENT ENCOUNTER: Primary | ICD-10-CM

## 2023-03-03 NOTE — PROGRESS NOTES
Daily Note     Today's date: 3/3/2023  Patient name: Chela Diaz  : 1950  MRN: [id number]  Referring provider: Kelsie Recio MD  Dx:   Encounter Diagnosis     ICD-10-CM    1  Closed displaced fracture of shaft of first metacarpal bone of left hand with routine healing, subsequent encounter  S63 362D                      Subjective: It's just so stiff  I try to do stuff with it at home  Objective: See treatment diary below      Assessment: Tolerated treatment well  Patient demonstrated fatigue post treatment and exhibited good technique with therapeutic exercises   Min-mod swelling the dorsal aspect of hand  Trialed KT tape for swelling  Pt continues with increase tightness wrist and digits, 1/2 fist closure noted pre /2-3/4 post tx  Plan: Continue per plan of care  Progress treatment as tolerated         Precautions s/p Left Hand Crush  Initial Evaluation completed on: 2023  Re-Evaluation needs to be completed before: 2023  Insurance: Medicare  FOTO: 2023  Auth Visits: N/A          Manuals 2023 2023 3/2/2023 3/6/2023    Scar Massage performed performed Cupping flexors     Digit Extension Stretch performed performed performed performed    Composite Fist Stretch performed performed performed performed    Edema Massage performed performed performed performed    Intrinsic stretches   performed performed    KT tape    Fingers/dorsal aspect of hand    Wrist and digit PROM performed performed performed performed    Neuro Re-Ed  2023 2023 3/2/2023 3/6/2023                                    Ther Ex 2023 2023 3/2/2023 3/6/2023    Blocking  DIP  PIP  MCP   X 10  X 10  X 10   X 10  X 10  X 10   X 10  X 10  X 10   X 10  X 10  X 10    TGE   X 20   X 20   X 20   X 20    Opposition X 10 X 10 X 10 X 10    Finger Adb/add   X 10     Digit Extension X 20 X 20 X 20 X 20    FDS Glide   X 10     Digi-Flex Yellow x 20  Red x 20 Yellow x 20  Red x 20 Red x 20 Red x 20    Theraputty  Grasps  Pinches   Intrinsic pushes  2 rows Intrinsic pushes  2 rows    Hand Power Pro        Flex bar  Bends  twist   X 15   X 15 Yellow  X 20  X 20 Yellow  X 20  X 20    Prayer stretch  20 sec x 4 20 sec x 4 20 sec x 4    Towel scrunches        Dowel supination stretch   20 sec x 5     Intrinsic plus   Foam x 20 Blue/Black Foarm x 20    Wrist AROM All directions   x 10 All directions  X 15 All directions  X 15 w/ball     Ther Activity 2/22/2023 2/27/2023 3/2/2023 3/6/2023    Tension Pin Pom Pom RTP  all poms RTP  all poms RTP  all poms RTP  all poms    Grooved Peg Board        poms        Ball w/beads Picked up in opposition x 2 Picked up in opposition x 2 Picked up in opposition x 2 Picked up in opposition x 2    Cone board Supination x 2 Supination x 2      Modalities 2/22/2023 2/27/2023 3/2/2023 3/6/2023    Ultrasound        CP          Performed post tx with digits in flexion Performed post tx with digits in flexion Performed post tx with digits in flexion

## 2023-03-06 ENCOUNTER — OFFICE VISIT (OUTPATIENT)
Dept: OCCUPATIONAL THERAPY | Facility: CLINIC | Age: 73
End: 2023-03-06

## 2023-03-06 DIAGNOSIS — S62.242D CLOSED DISPLACED FRACTURE OF SHAFT OF FIRST METACARPAL BONE OF LEFT HAND WITH ROUTINE HEALING, SUBSEQUENT ENCOUNTER: Primary | ICD-10-CM

## 2023-03-08 NOTE — PROGRESS NOTES
Daily Note     Today's date: 3/8/2023  Patient name: Dennie Hayward  : 1950  MRN: [id number]  Referring provider: Cornell Cárdenas MD  Dx:   Encounter Diagnosis     ICD-10-CM    1  Closed displaced fracture of shaft of first metacarpal bone of left hand with routine healing, subsequent encounter  S42 425T                      Subjective: It's just so tight  I do my exercises at home  Objective: See treatment diary below      Assessment: Tolerated treatment well  Patient demonstrated fatigue post treatment and would benefit from continued OT  Pt presents with increase tightness, despite aggressive range of motion, pt unable to maintain increases made in AROM post tx  Completed grooved pegs this date  Swelling decreasing  Plan: Continue per plan of care  Progress treatment as tolerated         Precautions s/p Left Hand Crush  Initial Evaluation completed on: 2023  Re-Evaluation needs to be completed before: 2023  Insurance: Medicare  FOTO: 2023  Auth Visits: N/A          Manuals 2023 2023 3/2/2023 3/6/2023 3/9/2023   Scar Massage performed performed Cupping flexors  Cupping flexor/extensors   Digit Extension Stretch performed performed performed performed performed   Composite Fist Stretch performed performed performed performed performed   Edema Massage performed performed performed performed performed   Intrinsic stretches   performed performed performed   KT tape    Fingers/dorsal aspect of hand Fingers/dorsal aspect of hand   Wrist and digit PROM performed performed performed performed performed   Neuro Re-Ed  2023 2023 3/2/2023 3/6/2023 3/9/2023                                   Ther Ex 2023 2023 3/2/2023 3/6/2023 3/9/2023   Blocking  DIP  PIP  MCP   X 10  X 10  X 10   X 10  X 10  X 10   X 10  X 10  X 10   X 10  X 10  X 10   X 10  X 10  X 10   TGE   X 20   X 20   X 20   X 20    Opposition X 10 X 10 X 10 X 10    Finger Adb/add   X 10     Digit Extension X 20 X 20 X 20 X 20    FDS Glide   X 10     Digi-Flex Yellow x 20  Red x 20 Yellow x 20  Red x 20 Red x 20 Red x 20 Red x 20   Theraputty  Grasps  Pinches   Intrinsic pushes  2 rows Intrinsic pushes  2 rows    Hand Power Pro        Flex bar  Bends  twist   X 15   X 15 Yellow  X 20  X 20 Yellow  X 20  X 20 Yellow  X 20  X 20   Prayer stretch  20 sec x 4 20 sec x 4 20 sec x 4    Towel scrunches        Dowel supination stretch   20 sec x 5     Intrinsic plus   Foam x 20 Blue/Black Foam x 20 Blue/Black foam  X 20   Wrist AROM All directions   x 10 All directions  X 15 All directions  X 15 w/ball     Ther Activity 2/22/2023 2/27/2023 3/2/2023 3/6/2023 3/9/2023   Tension Pin Pom Pom RTP  all poms RTP  all poms RTP  all poms RTP  all poms RTP  all poms   Grooved Peg Board     All in/out   5:30   poms        Ball w/beads Picked up in opposition x 2 Picked up in opposition x 2 Picked up in opposition x 2 Picked up in opposition x 2    Cone board Supination x 2 Supination x 2      Modalities 2/22/2023 2/27/2023 3/2/2023 3/6/2023 3/9/2023   Ultrasound        CP        MH  Performed post tx with digits in flexion Performed post tx with digits in flexion Performed post tx with digits in flexion Performed post manual

## 2023-03-09 ENCOUNTER — OFFICE VISIT (OUTPATIENT)
Dept: OCCUPATIONAL THERAPY | Facility: CLINIC | Age: 73
End: 2023-03-09

## 2023-03-09 DIAGNOSIS — S62.242D CLOSED DISPLACED FRACTURE OF SHAFT OF FIRST METACARPAL BONE OF LEFT HAND WITH ROUTINE HEALING, SUBSEQUENT ENCOUNTER: Primary | ICD-10-CM

## 2023-03-10 NOTE — PROGRESS NOTES
Daily Note     Today's date: 3/10/2023  Patient name: Dornida Schuster  : 1950  MRN: [id number]  Referring provider: Abril Perez MD  Dx:   Encounter Diagnosis     ICD-10-CM    1  Closed displaced fracture of shaft of first metacarpal bone of left hand with routine healing, subsequent encounter  X05 101H                      Subjective: I do my exercises at home  I don't know why my hand is so tight  Objective: See treatment diary below      Assessment: Tolerated treatment well  Patient demonstrated fatigue post treatment, exhibited good technique with therapeutic exercises and would benefit from continued OT  Pt verbalizes increase pain with PROM  L hand digits continue to be very tight, slight increase in wrist AROM  Swelling decreased overall  Plan: Continue per plan of care  Progress treatment as tolerated         Precautions s/p Left Hand Crush  Initial Evaluation completed on: 2023  Re-Evaluation needs to be completed before: 2023  Insurance: Medicare  FOTO: 2023  Auth Visits: N/A          Manuals 3/13/2023  3/2/2023 3/6/2023 3/9/2023   Scar Massage   Cupping flexors  Cupping flexor/extensors   Digit Extension Stretch performed  performed performed performed   Composite Fist Stretch performed  performed performed performed   Edema Massage performed  performed performed performed   Intrinsic stretches performed  performed performed performed   KT tape    Fingers/dorsal aspect of hand Fingers/dorsal aspect of hand   Wrist and digit PROM performed  performed performed performed   Neuro Re-Ed  3/13/2023  3/2/2023 3/6/2023 3/9/2023                                   Ther Ex 3/13/2023  3/2/2023 3/6/2023 3/9/2023   Blocking  DIP  PIP  MCP   X 10  X 10  X 10    X 10  X 10  X 10   X 10  X 10  X 10   X 10  X 10  X 10   TGE     X 20   X 20    Opposition X 10  X 10 X 10    Finger Adb/add   X 10     Digit Extension X 20  X 20 X 20    FDS Glide   X 10     Digi-Flex Red x 20  Red x 20 Red x 20 Red x 20   Theraputty  Grasps  Pinches Intrinsic pushes   2 rows  Intrinsic pushes  2 rows Intrinsic pushes  2 rows    Hand Power Pro        Flex bar  Bends  twist Yellow  X 20  X 20  Yellow  X 20  X 20 Yellow  X 20  X 20 Yellow  X 20  X 20   Prayer stretch 20 sec x 4  20 sec x 4 20 sec x 4    Towel scrunches        Dowel supination stretch 20 sec x 5  20 sec x 5     Intrinsic plus Blue/Black foam  X 20  Foam x 20 Blue/Black Foam x 20 Blue/Black foam  X 20   Wrist AROM   All directions  X 15 w/ball     Ther Activity 3/13/2023  3/2/2023 3/6/2023 3/9/2023   Tension Pin Pom Pom Red and green tension pin all poms  RTP  all poms RTP  all poms RTP  all poms   Grooved Peg Board     All in/out   5:30           Ball w/beads Picked up in opposition x 2  Picked up in opposition x 2 Picked up in opposition x 2    Cone board        Modalities 3/13/2023  3/2/2023 3/6/2023 3/9/2023   Ultrasound        CP         Performed post tx with digits in flexion  Performed post tx with digits in flexion Performed post tx with digits in flexion Performed post manual

## 2023-03-13 ENCOUNTER — OFFICE VISIT (OUTPATIENT)
Dept: OCCUPATIONAL THERAPY | Facility: CLINIC | Age: 73
End: 2023-03-13

## 2023-03-13 DIAGNOSIS — S62.242D CLOSED DISPLACED FRACTURE OF SHAFT OF FIRST METACARPAL BONE OF LEFT HAND WITH ROUTINE HEALING, SUBSEQUENT ENCOUNTER: Primary | ICD-10-CM

## 2023-03-14 NOTE — PROGRESS NOTES
Daily Note     Today's date: 3/14/2023  Patient name: Ese Gomez  : 1950  MRN: [id number]  Referring provider: Matt Smith MD  Dx:   Encounter Diagnosis     ICD-10-CM    1  Closed displaced fracture of shaft of first metacarpal bone of left hand with routine healing, subsequent encounter  B36 [id number]                      Subjective: I can hold things now and use it, I couldn't do that before  Objective: See treatment diary below      Assessment: Tolerated treatment well  Patient demonstrated fatigue post treatment and would benefit from continued OT  Pt reports primarily focusing on finger exercises at home, instructed pt to focus on wrist motion as well  Pt demonstrated increase digit and wrist AROM post tx  Tolerated increase  strength for digiflex  Plan: Continue per plan of care  Progress treatment as tolerated         Precautions s/p Left Hand Crush  Initial Evaluation completed on: 2023  Re-Evaluation needs to be completed before: 2023  Insurance: Medicare  FOTO: 2023  Auth Visits: N/A          Manuals 3/13/2023 3/16/2023   3/9/2023   Scar Massage     Cupping flexor/extensors   Digit Extension Stretch performed 5'   performed   Composite Fist Stretch performed X 5   performed   Edema Massage performed    performed   Intrinsic stretches performed 5'   performed   KT tape     Fingers/dorsal aspect of hand   Wrist and digit PROM performed 10'   performed   Neuro Re-Ed  3/13/2023 3/16/2023   3/9/2023                                   Ther Ex 3/13/2023 3/16/2023   3/9/2023   Blocking  DIP  PIP  MCP   X 10  X 10  X 10   X 10  X 10  X 10     X 10  X 10  X 10   TGE        Opposition X 10 X 10      Finger Adb/add        Digit Extension X 20 X 20      FDS Glide        Digi-Flex Red x 20 Green x 20   Red x 20   theraputty  Intrinsic pushes Intrinsic pushes   2 rows yellow  2 rows      Hand Power Pro        Flex bar  Bends  twist Yellow  X 20  X 20 Yellow  X 20  X 20 Yellow  X 20  X 20   Prayer stretch 20 sec x 4 20 sec x 4      Towel scrunches        Dowel supination stretch 20 sec x 5       Intrinsic plus Blue/Black foam  X 20 Blue/Black  Foam  X 20   Blue/Black foam  X 20   Wrist AROM        Ther Activity 3/13/2023 3/16/2023   3/9/2023   Tension Pin Pom Pom Red and green tension pin all poms Red and green tension pin all poms   RTP  all poms   Grooved Peg Board     All in/out   5:30           Ball w/beads Picked up in opposition x 2       Cone board        Modalities 3/13/2023 3/16/2023   3/9/2023   Ultrasound        CP        MH Performed post tx with digits in flexion Performed post tx w/digits in flexion   Performed post manual

## 2023-03-16 ENCOUNTER — OFFICE VISIT (OUTPATIENT)
Dept: OCCUPATIONAL THERAPY | Facility: CLINIC | Age: 73
End: 2023-03-16

## 2023-03-16 DIAGNOSIS — S62.242D CLOSED DISPLACED FRACTURE OF SHAFT OF FIRST METACARPAL BONE OF LEFT HAND WITH ROUTINE HEALING, SUBSEQUENT ENCOUNTER: Primary | ICD-10-CM

## 2023-03-20 ENCOUNTER — OFFICE VISIT (OUTPATIENT)
Dept: OCCUPATIONAL THERAPY | Facility: CLINIC | Age: 73
End: 2023-03-20

## 2023-03-20 DIAGNOSIS — S62.242D CLOSED DISPLACED FRACTURE OF SHAFT OF FIRST METACARPAL BONE OF LEFT HAND WITH ROUTINE HEALING, SUBSEQUENT ENCOUNTER: Primary | ICD-10-CM

## 2023-03-20 NOTE — PROGRESS NOTES
Daily Note     Today's date: 3/20/2023  Patient name: Jenine Sandifer  : 1950  MRN: [id number]  Referring provider: Yanni Monroy MD  Dx:   Encounter Diagnosis     ICD-10-CM    1  Closed displaced fracture of shaft of first metacarpal bone of left hand with routine healing, subsequent encounter  C12 258Z                      Subjective: It's so tight  I do all the exercises at home  Objective: See treatment diary below      Assessment: Tolerated treatment well  Patient demonstrated fatigue post treatment, exhibited good technique with therapeutic exercises and would benefit from continued OT  Some pitting edema continues on dorsal aspect of left hand  Pt continues to have difficulty maintaining wrist and digit active and passive range of motion made with therapy  Pain increases with PROM  Plan: Continue per plan of care  Progress treatment as tolerated         Precautions s/p Left Hand Crush  Initial Evaluation completed on: 2023  Re-Evaluation needs to be completed before: 2023  Insurance: Medicare  FOTO: 2023  Auth Visits: N/A          Manuals 3/13/2023 3/16/2023 3/20/2023  3/9/2023   Scar Massage     Cupping flexor/extensors   Digit Extension Stretch performed 5' 5'  performed   Composite Fist Stretch performed X 5 X 5  performed   Edema Massage performed    performed   Intrinsic stretches performed 5' 5'  performed   KT tape     Fingers/dorsal aspect of hand   Wrist and digit PROM performed 10' 10'  performed   Neuro Re-Ed  3/13/2023 3/16/2023 3/20/2023  3/9/2023                                   Ther Ex 3/13/2023 3/16/2023 3/20/2023  3/9/2023   Blocking  DIP  PIP  MCP   X 10  X 10  X 10   X 10  X 10  X 10     X 10  X 10  X 10   TGE        Opposition X 10 X 10 X 10     Finger Adb/add        Digit Extension X 20 X 20 X 20     FDS Glide        Digi-Flex Red x 20 Green x 20 Green x 20  Red x 20   theraputty  Intrinsic pushes Intrinsic pushes   2 rows yellow  2 rows Yellow  2 rows     Hand Power Pro        Flex bar  Bends  twist Yellow  X 20  X 20 Yellow  X 20  X 20 Yellow  X 20  X 20  Yellow  X 20  X 20   Prayer stretch 20 sec x 4 20 sec x 4      Towel scrunches        Dowel supination stretch 20 sec x 5       Intrinsic plus Blue/Black foam  X 20 Blue/Black  Foam  X 20 Blue/Black Foam  X 20  Blue/Black foam  X 20   Wrist AROM   3# flex/ext stretch 30 sec x 3     Ther Activity 3/13/2023 3/16/2023 3/20/2023  3/9/2023   Tension Pin Pom Pom Red and green tension pin all poms Red and green tension pin all poms Red and green tension pin all poms  RTP  all poms   Grooved Peg Board   All in/out  4:20  All in/out   5:30           Ball w/beads Picked up in opposition x 2       Cone board        Modalities 3/13/2023 3/16/2023 3/20/2023  3/9/2023   Ultrasound        CP        MH Performed post tx with digits in flexion Performed post tx w/digits in flexion Performed post w/digits in flexion  Performed post manual

## 2023-03-23 ENCOUNTER — OFFICE VISIT (OUTPATIENT)
Dept: OCCUPATIONAL THERAPY | Facility: CLINIC | Age: 73
End: 2023-03-23

## 2023-03-23 DIAGNOSIS — S62.242D CLOSED DISPLACED FRACTURE OF SHAFT OF FIRST METACARPAL BONE OF LEFT HAND WITH ROUTINE HEALING, SUBSEQUENT ENCOUNTER: Primary | ICD-10-CM

## 2023-03-23 NOTE — PROGRESS NOTES
Daily Note     Today's date: 3/23/2023  Patient name: Jose G Harrison  : 1950  MRN: [id number]  Referring provider: Ck Sanderson MD  Dx:   Encounter Diagnosis     ICD-10-CM    1  Closed displaced fracture of shaft of first metacarpal bone of left hand with routine healing, subsequent encounter  H26 919P                      Subjective: It's just so tight  Objective: See treatment diary below      Assessment: Tolerated treatment well  Patient demonstrated fatigue post treatment, exhibited good technique with therapeutic exercises and would benefit from continued OT  Post tx pt able to achieve 3/4 active fist closure  Plan: Continue per plan of care  Progress treatment as tolerated         Precautions s/p Left Hand Crush  Initial Evaluation completed on: 2023  Re-Evaluation needs to be completed before: 2023  Insurance: Medicare  FOTO: 2023  Auth Visits: N/A          Manuals 3/13/2023 3/16/2023 3/20/2023 3/23/2023    Scar Massage        Digit Extension Stretch performed 5' 5' 5'    Composite Fist Stretch performed X 5 X 5 X 5    jt mobs performed   5'    Intrinsic stretches performed 5' 5' 5'    KT tape        Wrist and digit PROM performed 10' 10' 10'    Neuro Re-Ed  3/13/2023 3/16/2023 3/20/2023 3/23/2023                                    Ther Ex 3/13/2023 3/16/2023 3/20/2023 3/23/2023    Blocking  DIP  PIP  MCP   X 10  X 10  X 10   X 10  X 10  X 10      TGE        Opposition X 10 X 10 X 10 X 10    Finger Adb/add    X 15    Digit Extension X 20 X 20 X 20 X 20    FDS Glide        Digi-Flex Red x 20 Green x 20 Green x 20 Green x 20    theraputty  Intrinsic pushes Intrinsic pushes   2 rows yellow  2 rows Yellow  2 rows Red  2 rows    Hand Power Pro    Red RB x 20    Flex bar  Bends  Twist  Bottle cap Yellow  X 20  X 20 Yellow  X 20  X 20 Yellow  X 20  X 20 Yellow  X 20  X 20  X 20    Prayer stretch 20 sec x 4 20 sec x 4      Towel scrunches    X 20    Dowel supination stretch 20 sec x 5       Intrinsic plus Blue/Black foam  X 20 Blue/Black  Foam  X 20 Blue/Black Foam  X 20     Wrist AROM   3# flex/ext stretch 30 sec x 3 4# flex/ext stretch 30 sec x 3    Ther Activity 3/13/2023 3/16/2023 3/20/2023 3/23/2023    Tension Pin Pom Pom Red and green tension pin all poms Red and green tension pin all poms Red and green tension pin all poms Green tension pin all poms    Grooved Peg Board   All in/out  4:20             Ball w/beads Picked up in opposition x 2       Cone board        Modalities 3/13/2023 3/16/2023 3/20/2023 3/23/2023    Ultrasound        CP        MH Performed post tx with digits in flexion Performed post tx w/digits in flexion Performed post w/digits in flexion Performed post w/digits in flexion

## 2023-03-24 NOTE — PROGRESS NOTES
Daily Note     Today's date: 3/24/2023  Patient name: Jumana Goldberg  : 1950  MRN: [id number]  Referring provider: Chris Omer MD  Dx:   Encounter Diagnosis     ICD-10-CM    1  Closed displaced fracture of shaft of first metacarpal bone of left hand with routine healing, subsequent encounter  L15 824Y                      Subjective: I do these at home too  Objective: See treatment diary below      Assessment: Tolerated treatment well  Patient demonstrated fatigue post treatment and would benefit from continued OT  Reassessment completed by OTR, refer to same for details  Decrease tolerance to PROM secondary to increase pain with same  Plan: Continue per plan of care  Progress treatment as tolerated         Precautions s/p Left Hand Crush  Initial Evaluation completed on: 2023  Re-Evaluation needs to be completed before: 2023  Insurance: Medicare  FOTO: 2023  Auth Visits: N/A          Manuals 3/13/2023 3/16/2023 3/20/2023 3/23/2023 3/27/2023   Scar Massage        Digit Extension Stretch performed 5' 5' 5' 5'   Composite Fist Stretch performed X 5 X 5 X 5 X 5   jt mobs performed   5' 5'   Intrinsic stretches performed 5' 5' 5' 3'   KT tape        Wrist and digit PROM performed 10' 10' 10' 7'   Neuro Re-Ed  3/13/2023 3/16/2023 3/20/2023 3/23/2023 3/27/2023                                   Ther Ex 3/13/2023 3/16/2023 3/20/2023 3/23/2023 3/27/2023   Blocking  DIP  PIP  MCP   X 10  X 10  X 10   X 10  X 10  X 10      TGE        Opposition X 10 X 10 X 10 X 10    Finger Adb/add    X 15 X 15   Digit Extension X 20 X 20 X 20 X 20 X 20   FDS Glide        Digi-Flex Red x 20 Green x 20 Green x 20 Green x 20 Green x 20   theraputty  Intrinsic pushes Intrinsic pushes   2 rows yellow  2 rows Yellow  2 rows Red  2 rows Red  2 rows   Hand Power Pro    Red RB x 20 Red RB x 20   Flex bar  Bends  Twist  Bottle cap Yellow  X 20  X 20 Yellow  X 20  X 20 Yellow  X 20  X 20 Yellow  X 20  X 20  X 20 Yellow  X 20  X 20  X 20   Prayer stretch 20 sec x 4 20 sec x 4      Towel scrunches    X 20 X 20   Dowel supination stretch 20 sec x 5       Intrinsic plus Blue/Black foam  X 20 Blue/Black  Foam  X 20 Blue/Black Foam  X 20  Black Foam  X 20   Wrist AROM   3# flex/ext stretch 30 sec x 3 4# flex/ext stretch 30 sec x 3 4# flex/ext stretch 30 sec x 3   Ther Activity 3/13/2023 3/16/2023 3/20/2023 3/23/2023 3/27/2023   Tension Pin Pom Pom Red and green tension pin all poms Red and green tension pin all poms Red and green tension pin all poms Green tension pin all poms Green tension pin all poms   Grooved Peg Board   All in/out  4:20             Ball w/beads Picked up in opposition x 2       Cone board        Modalities 3/13/2023 3/16/2023 3/20/2023 3/23/2023 3/27/2023   Ultrasound        CP         Performed post tx with digits in flexion Performed post tx w/digits in flexion Performed post w/digits in flexion Performed post w/digits in flexion Performed post w/digits in flexion

## 2023-03-27 ENCOUNTER — EVALUATION (OUTPATIENT)
Dept: OCCUPATIONAL THERAPY | Facility: CLINIC | Age: 73
End: 2023-03-27

## 2023-03-27 DIAGNOSIS — S62.242D CLOSED DISPLACED FRACTURE OF SHAFT OF FIRST METACARPAL BONE OF LEFT HAND WITH ROUTINE HEALING, SUBSEQUENT ENCOUNTER: Primary | ICD-10-CM

## 2023-03-27 NOTE — PROGRESS NOTES
OT Re-Evaluation     Today's date: 3/27/2023  Patient name: Dottie De Guzman  : 1950  MRN: [id number]  Referring provider: Pascual Snowden MD  Dx:   Encounter Diagnosis     ICD-10-CM    1  Closed displaced fracture of shaft of first metacarpal bone of left hand with routine healing, subsequent encounter  S62 [id number]           Start Time: 1300  Stop Time: 1400  Total time in clinic (min): 60 minutes    Assessment  Assessment details: Patient presenting to OP OT services with a dx left hand crush injury sustained on 2023 when his hand was struck by a log splitter  Patient had I&D completed on 2023  Patient had recent follow-up with Ortho on 2023 and was placed in an HB radial gutter fabricated by OT  Patient reports sustaining fracture on left hand when he was splitting logs  Patient has follow-up wit Ortho on 02/15/2023  Patient is right hand dominant  As per ED note: 74 y o  male who presented to the ED after a crush injury to the left hand from a log splitter sustaining open long finger metacarpal fracture, open index finger metacarpal fracture, and open proximal phalanx fracture of the long finger  Pt was admitted to the orthopaedic service and taken to the OR on 1/3/23 for Left hand I&D, ORIF index and long finger Metacarpal fx, long finger proximal phalanx fx and wound closure  Prior to surgery the risks and benefits of surgery were explained and informed consent was obtained  Surgery went without complications and pt was discharged to the PACU in a stable condition and was transferred to the floor  On discharge date pt was cleared by PT and the medicine team and determined to be safe for discharge  Re-assessment completed on 2023  Patient has consistently attended OP OT services since start of care  Patient has made steady progress towards established goals   Patient demonstrating with increases in hand range of motion,  strength, pinch strength, fine motor coordination and decreases in swelling  Patient has not met all established goals and will benefit from continued OT services to maximize level of function  Patient had recent appointment with Ortho on 02/19/2023 with good progress noted  Patient returns to Ortho on 03/29/2023  Routine healing noted throughout incision sites  Re-assessment completed on 03/27/2023  Patient has consistently attended OP OT services since start of care  Patient has made steady progress towards established goals  Patient demonstrating with increases in hand and digit range of motion,  strength, pinch strength and decreases in swelling and pain  Patient has not met all established goals and will benefit from continued OT services to maximize level of function  Patient has follow-up with Ortho on 03/29/2023  Patient reports improvements with attending therapy  Patient continues to present with intrinsic tightness and decrease ability to complete composite fist    Impairments: abnormal or restricted ROM, activity intolerance and impaired physical strength  Other impairment: functional use    Symptom irritability: moderateUnderstanding of Dx/Px/POC: good   Prognosis: good  Prognosis details: STGs    Pt will increase  strength by 5-10#  - Met    Pt will increase digit ROM by 50%  - Met    Pt will demonstrate decrease in edema by 25%  - Met    Pt will report a decrease in sensation deficits by 25%  - Met    Independent with HEP  - Met    Pt will D/C splint wear  - Met    LTGs     Pt will increase  strength by an additional 5-10#  - Progressing    Pt will increase digit ROM to WNL  - Progressing    Pt will demonstrate decrease in edema by 50%  - Progressing    Pt will increase pinch strength by 3-5#  - Progressing    Pt will report an increase in ADL/IADL participation  - Progressing    Pt will report a decrease in sensation deficits by 50%   - Progressing            Plan  Plan details:     Patient would benefit from: custom splinting, OT eval and skilled occupational therapy  Referral necessary: Yes  Planned modality interventions: cryotherapy, ultrasound, unattended electrical stimulation and thermotherapy: hydrocollator packs  Planned therapy interventions: manual therapy, massage, patient education, self care, strengthening, stretching, therapeutic activities, fine motor coordination training, home exercise program, functional ROM exercises and therapeutic exercise  Frequency: 2x week  Duration in visits: 8  Duration in weeks: 4  Treatment plan discussed with: patient        Subjective Evaluation    History of Present Illness  Date of onset: 2023  Date of surgery: 1/3/2023  Mechanism of injury: surgery and trauma  Mechanism of injury: S/p crush injury of left hand          Not a recurrent problem   Quality of life: good    Pain  Current pain ratin  At best pain ratin  At worst pain ratin  Location: Left Hand  Quality: dull ache    Social Support  Lives with: spouse    Employment status: not working  Hand dominance: right      Diagnostic Tests  X-ray: abnormal  Treatments  Current treatment: immobilization and occupational therapy  Patient Goals  Patient goals for therapy: decreased edema, decreased pain, increased motion, increased strength and independence with ADLs/IADLs          Objective     Observations     Left Wrist/Hand   Positive for incision  Additional Observation Details  Patient presenting with a 5 cm incision along dorsal 2nd and 3rd metacarpal and 2 cm along IP of 4th digit on the dorsal aspect  Patient presenting with steri-stripes along both incisions       Routine healing noted with minimal scar adhesions    Neurological Testing     Sensation     Wrist/Hand   Left   Diminished: light touch    Right   Intact: light touch    Additional Neurological Details  Patient reports sensation deficits along dorsal aspect of left hand along 2nd and 3rd metacarpal    Improvement in sensation deficits noted since start of care    Active Range of Motion     Left Wrist   Wrist flexion: 25 degrees   Wrist extension: 20 degrees   Radial deviation: 15 degrees   Ulnar deviation: 20 degrees     Right Wrist   Normal active range of motion    Left Thumb   Opposition: WNL    Right Thumb   Opposition: WNL    Left Digits    Flexion   Index     MCP: 60    PIP: 60    DIP: 40  Middle     MCP: 50    PIP: 70    DIP: 45  Ring     MCP: 50    PIP: 70    DIP: 50  Little     MCP: 55    PIP: 55    DIP: 50    Additional Active Range of Motion Details  Patient demonstrating with slight decreased left wrist ROM compared to last assessment  Soft fist with 8 cm from tips of digit to [facility]    Soft fist with 6 cm from tips of digit to [facility] on re-assessment on 02/27/2023    Soft fist with 5 cm from tips of digit to [facility] on re-assessment on 03/27/2023    Increased ROM at all DIP,PIP and MCPs since last assessment    Strength/Myotome Testing     Left Wrist/Hand   Wrist extension: 3-  Wrist flexion: 3-  Radial deviation: 3-  Ulnar deviation: 3-     (2nd hand position)     Trial 1: 5    Thumb Strength  Key/Lateral Pinch     Trial 1: 3    Right Wrist/Hand   Normal wrist strength     (2nd hand position)     Trial 1: 55    Thumb Strength   Key/Lateral Pinch     Trial 1: 14    Additional Strength Details  Increase in  strength since start of care    Swelling     Left Wrist/Hand   Middle     Proximal: 7 7 cm    Middle: 7 cm    Distal: 6 cm  Circumference MCP: 21 5 cm  Circumference wrist: 18 2 cm    Additional Swelling Details  Decreased swelling in left hand as compared to last assessment    Right Wrist/Hand   Middle     Proximal: 7 cm    Middle: 6 5 cm    Distal: 5 2 cm  Circumference MCP: 21 2 cm  Circumference wrist: 17 5 cm  Neuro Exam:     Functional outcomes   Left 9 peg hole test: 0 (seconds)  Right 9 hole peg test: 24 79 (seconds)  Functional outcome comment: Re-assessment completed on 02/27/2023  R 49 66

## 2023-03-29 ENCOUNTER — OFFICE VISIT (OUTPATIENT)
Dept: OBGYN CLINIC | Facility: HOSPITAL | Age: 73
End: 2023-03-29

## 2023-03-29 ENCOUNTER — TELEPHONE (OUTPATIENT)
Dept: OBGYN CLINIC | Facility: CLINIC | Age: 73
End: 2023-03-29

## 2023-03-29 ENCOUNTER — HOSPITAL ENCOUNTER (OUTPATIENT)
Dept: RADIOLOGY | Facility: HOSPITAL | Age: 73
Discharge: HOME | End: 2023-03-29
Attending: ORTHOPAEDIC SURGERY

## 2023-03-29 VITALS
HEART RATE: 72 BPM | WEIGHT: 208 LBS | SYSTOLIC BLOOD PRESSURE: 145 MMHG | BODY MASS INDEX: 29.12 KG/M2 | HEIGHT: 71 IN | DIASTOLIC BLOOD PRESSURE: 85 MMHG

## 2023-03-29 DIAGNOSIS — Z48.89 AFTERCARE FOLLOWING SURGERY: ICD-10-CM

## 2023-03-29 DIAGNOSIS — Z48.89 AFTERCARE FOLLOWING SURGERY: Primary | ICD-10-CM

## 2023-03-29 DIAGNOSIS — M25.642 JOINT STIFFNESS OF HAND, LEFT: ICD-10-CM

## 2023-03-29 NOTE — PROGRESS NOTES
PATIENT: Jorge Garibay   MRN: [id number] : 1982  AGE: 39 year old    Date: 2021                                       Behavioral Health Adult Visit Progress Report     Time Session Began: 08  Time Session Ended: 829    Due to COVID-19 precautions, this visit was performed via live interactive two-way Video visit with patient's verbal consent.   Clinician Location:Home.  Patient Location: Home.  Verified patient identity:  [x] Yes    Session Type: Individual Followup    Other Present: N/A    Intervention: Behavioral, Cognitive, Insight, Supportive    Suicide/Homicide/Violence Ideation: No signs of suicidal ideation were observed by writer or noted by patient. Writer will continue to assess and monitor.  If Yes, explain: N/A    Current Outpatient Prescriptions:  Medications    Medication Directions   venlafaxine XR (EFFEXOR XR) 75 MG 24 hr capsule TAKE 1 CAPSULE DAILY ALONG WITH 150 MG   venlafaxine XR (EFFEXOR XR) 150 MG 24 hr capsule TAKE 1 CAPSULE DAILY ALONG WITH 75 MG   traZODone (DESYREL) 50 MG tablet Take half to 1 tablet at bedtime as needed for sleep.   methaDONE (DOLOPHINE) 10 MG/ML solution Take by mouth daily.       Change in Medication(s) Reported: No   If Yes, explain:     Patient/Family Education Provided: Yes  Patient/Family Displays Understanding: Yes  If No, explain: N/A    Chief complaint in patient's own words: \"Things have been good\"    Progress Note containing chief complaint and symptoms/problems related to the complaint:    Jorge Garibay presents in person for individual follow-up for moderate opioid use disorder, in early remission, on MAT; AUSTEN, MDD. No SI/HI/AVH noted by patient. Patient discussed recent events and stressors since last appointment.  Patient reported that taper off of methadone has been going smoothly so far for the most part; he has noticed feeling tired again in the evenings and some increase in irritability. Writer suggested patient follow up with  ASSESSMENT/PLAN:    Assessment:   S/P Irrigation and debridement of the left hand open index and long finger metacarpal fractures  (Skin, subcutaneous tissue, muscle, fascia, and bone)  Irrigation and debridement skin, subcutaneous tissue, fascia and tendon left hand ring finger proximal phalanx wound  - Left, Open reduction and internal fixation left hand index and long finger metacarpal fractures  Open reduction and internal fixation left hand long finger proximal phalanx fracture - Left, and Application short arm splint - Left on 1/3/2023    He continues to have significant stiffness to left hand although this has improved with physical therapy  He reports he is able to complete his activities of daily living  He should continue with aggressive hand therapy and home exercise program in order to further increase his range of motion to his left hand/wrist    Plan:   Continue aggressive hand therapy    Follow Up:  8 weeks    To Do Next Visit:  Recheck    General Discussions:      Operative Discussions:     Fracture Operative Treatment: The physiology of a fractured bone was discussed with the patient today  With displaced fractures, operative treatment often results in a functional recovery  Typically, these fractures undergo reduction either through percutaneous or open methods depending on the location and fracture pattern  Radiographs are typically taken at intervals throughout the fracture healing ensure maintenance of reduction and alignment  If the fracture loses its alignment, revision surgery may be required  Medical conditions such as diabetes, osteoporosis, vitamin D deficiency, and a history of or exposure to smoking may delay or prevent fracture healing    The risks and benefits of the procedure were explained to the patient, which include, but are not limited to: Bleeding, infection, recurrence, pain, scar, malunion, nonunion, damage to tendons, damage to nerves, and damage to blood vessels, and Dr. Briseno to let her know about the taper and see if there is anything she can do to further support this. Patient denies any cravings since last visit. Had a nice holiday with family. Reported things are going better with his youngest daughter's behavior. Was able to recognize how he is able to participate more as a parent when he is not using. States that being involved with his children really helps him. Patient was informed of writer's departure and we discussed transfer process.     Writer provided emotional support, encouragement, and active listening to patient. Previous coping skills were reviewed.    MENTAL STATUS:  The patient is alert and oriented 3x. Patient is pleasant and engaged in session today. Patient displays good eye contact. Speech is of normal rate and tone. Thought processes are linear and goal directed. Mood is euthymic with congruent affect.     Patient is encouraged to exercise on regular basis, implement healthy diet, read self-help literature, keep journal, and take medications if/as prescribed by PCP and/or psychiatrist. Patient to return for follow-up in 2 weeks. 29 minutes spent in face-to-face psychotherapy.    Need for Community Resources Assessed: Yes    Resources Needed: No    If Yes, what resources: N/A      PRIMARY DIAGNOSIS: Moderate opioid dependence in early remission on maintenance therapy (CMS/HCC)  (primary encounter diagnosis)  MDD (major depressive disorder), recurrent episode, moderate (CMS/HCC)  AUSTNE (generalized anxiety disorder)      Treatment Plan: Unchanged    Discharge Plan: Strategies Discussed to Maintain Gains    Deana Mares, MSW, LCSW, CSAC       complications related to anesthesia, failure to give desire result, need for more surgery  These risks, along with alternative conservative treatment options, and postoperative protocols were voiced back and understood by the patient  All questions were answered to the patient's satisfaction  The patient agrees to comply with a standard postoperative protocol, and is willing to proceed  Education was provided via written and auditory forms  There were no barriers to learning  Written handouts regarding wound care, incision and scar care, and general preoperative information was provided to the patient  Prior to surgery, the patient may be requested to stop all anti-inflammatory medications  Prophylactic aspirin, Plavix, and Coumadin may be allowed to be continued  Medications including vitamin E , ginkgo, and fish oil are requested to be stopped approximately one week prior to surgery  Hypertensive medications and beta blockers, if taken, should be continued  _____________________________________________________  CHIEF COMPLAINT:  Chief Complaint   Patient presents with   • Left Hand - Post-op         SUBJECTIVE:  Joana Quintana is a 74 y o  male who presents for follow up regarding surgery 1/3/23 for open left index and long finger metacarpal fractures and open left long finger proximal phalanx fracture  Since last visit, Joana Quintana has tried therapy for stiffness with some improvement  Today there is no pain no numbness or tingling, no swelling no redness no fevers chills       Radiation: None  Associated symptoms: Stiffness/LROM  Handedness: right  Work status: Retired    PAST MEDICAL HISTORY:  Past Medical History:   Diagnosis Date   • Arthritis    • Atrial fibrillation ([address] )    • Cataract    • Glaucoma     Right eye   • Hypertension        PAST SURGICAL HISTORY:  Past Surgical History:   Procedure Laterality Date   • CAST APPLICATION Left 8/3/7167    Procedure: Application short arm splint;  Surgeon: Breanna Miranda MD;  Location: [facility];  Service: Orthopedics   • CHEST SURGERY      Unsure of surgery, pt had a trauma and had chest surgery anterior and L posterior   • FEMUR FRACTURE SURGERY Left    • HAND DEBRIDEMENT Left 1/3/2023    Procedure: Irrigation and debridement of the left hand open index and long finger metacarpal fractures  (Skin, subcutaneous tissue, muscle, fascia, and bone)  Irrigation and debridement skin, subcutaneous tissue, fascia and tendon left hand ring finger proximal phalanx wound ;  Surgeon: Breanna Miranda MD;  Location: [facility];  Service: Orthopedics   • HAND FRACTURE REPAIR Left 1/3/2023    Procedure: Open reduction and internal fixation left hand index and long finger metacarpal fractures  Open reduction and internal fixation left hand long finger proximal phalanx fracture;  Surgeon: Breanna Miranda MD;  Location: [facility];  Service: Orthopedics   • HIP FRACTURE SURGERY Left    • KNEE SURGERY Left        FAMILY HISTORY:  No family history on file      SOCIAL HISTORY:  Social History     Tobacco Use   • Smoking status: Never   • Smokeless tobacco: Never   Substance Use Topics   • Alcohol use: Yes     Comment: 12 drinks monthly   • Drug use: No       MEDICATIONS:    Current Outpatient Medications:   •  acetaminophen (TYLENOL) 325 mg tablet, Take 650 mg by mouth every 6 (six) hours as needed for mild pain, Disp: , Rfl:   •  AMLODIPINE BENZOATE PO, Take 2 5 mg by mouth daily, Disp: , Rfl:   •  apixaban (ELIQUIS) 5 mg, Take 1 tablet (5 mg total) by mouth 2 (two) times a day, Disp: 60 tablet, Rfl: 5  •  BENAZEPRIL HCL PO, Take 10 mg by mouth daily, Disp: , Rfl:   •  flecainide (TAMBOCOR) 50 mg tablet, Take 1 tablet (50 mg total) by mouth 2 (two) times a day, Disp: 60 tablet, Rfl: 5  •  latanoprost (XALATAN) 0 005 % ophthalmic solution, 1 drop daily at bedtime, Disp: , Rfl:   •  metoprolol succinate (TOPROL-XL) 25 mg 24 hr tablet, Take 1 tablet (25 mg total) by mouth daily, Disp: 60 tablet, Rfl: 5  •  Multiple Vitamins-Minerals (CENTRUM SILVER PO), Take by mouth, Disp: , Rfl:   •  pravastatin (PRAVACHOL) 20 mg tablet, Take 20 mg by mouth daily, Disp: , Rfl:     ALLERGIES:  Allergies   Allergen Reactions   • Demerol [Meperidine]      Unknown reaction        REVIEW OF SYSTEMS:  Pertinent items are noted in HPI  A comprehensive review of systems was negative  LABS:  HgA1c: No results found for: HGBA1C  BMP:   Lab Results   Component Value Date    CALCIUM 8 5 01/04/2023     05/05/2018    K 4 7 01/04/2023    CO2 29 01/04/2023     01/04/2023    BUN 18 01/04/2023    CREATININE 0 97 01/04/2023           _____________________________________________________  PHYSICAL EXAMINATION:  Vital signs: There were no vitals taken for this visit  General: well developed and well nourished, alert, oriented times 3 and appears comfortable  Psychiatric: Normal  HEENT: Trachea Midline, No torticollis  Cardiovascular: No discernable arrhythmia  Pulmonary: No wheezing or stridor  Abdomen: No rebound or guarding  Extremities: No peripheral edema  Skin: No masses, erythema, lacerations, fluctation, ulcerations  Neurovascular: Sensation Intact to the Median, Ulnar, Radial Nerve, Motor Intact to the Median, Ulnar, Radial Nerve and Pulses Intact    MUSCULOSKELETAL EXAMINATION:  Left hand: Well healed surgicla incisions  No swelling redness induration, no open wounds  No TTP elicited  Stiffenss to index long ring and small fingers  Can passively range MP joints to 60 deg, IP joints to 90 deg  MP and PIP joints about 15-20 deg shy of full extension    Able to flex extend DIP PIP and MCPJ of all fingers  2+ radial pulse hand warm perfused      _____________________________________________________  STUDIES REVIEWED:  Images were reviewed in PACS by Dr Demi Corea and demonstrate: Kathleen Wilder left hand AP lateral oblique shows healing left index long finger MC fractures, left long finger proximal phalanx fracture, both status post ORIF w intramedullary screw   No other fracture or dislocation      PROCEDURES PERFORMED:  None today

## 2023-03-29 NOTE — TELEPHONE ENCOUNTER
Caller: Patient     Doctor/Office: Dr Portillo    Call regarding :  Returning call to schedule      Call was transferred to: Hand scheduling

## 2023-03-29 NOTE — TELEPHONE ENCOUNTER
----- Message from [id number] Manuel sent at 3/29/2023  1:49 PM EDT -----  Regardinwk f/up  Hello,     Patient needs a 8wk f/up with Dr Gurwinder Almonte  Can you help with an appointment?     Thank you,     Walter Peres

## 2023-03-29 NOTE — PROGRESS NOTES
Daily Note     Today's date: 3/29/2023  Patient name: Terrell Victor  : 1950  MRN: [id number]  Referring provider: Edenilson Jurado MD  Dx:   Encounter Diagnosis     ICD-10-CM    1  Closed displaced fracture of shaft of first metacarpal bone of left hand with routine healing, subsequent encounter  A16 653I                      Subjective: The doctor said I should continue with therapy  Objective: See treatment diary below      Assessment: Tolerated treatment well  Patient demonstrated fatigue post treatment, exhibited good technique with therapeutic exercises and would benefit from continued OT  Pt able to achieve 1/2-3/4 fist closure post tx  Plan: Continue per plan of care  Progress treatment as tolerated         Precautions s/p Left Hand Crush  Initial Evaluation completed on: 2023  Re-Evaluation needs to be completed before: 2023  Insurance: Medicare  FOTO: 2023  Auth Visits: N/A          Manuals 3/30/2023  3/20/2023 3/23/2023 3/27/2023   Scar Massage        Digit Extension Stretch 3'  5' 5' 5'   Composite Fist Stretch X 5  X 5 X 5 X 5   jt mobs 4'   5' 5'   Intrinsic stretches 3'  5' 5' 3'   KT tape        Wrist and digit PROM 10'  10' 10' 7'   Neuro Re-Ed  3/30/2023  3/20/2023 3/23/2023 3/27/2023                                   Ther Ex 3/30/2023  3/20/2023 3/23/2023 3/27/2023   Blocking  DIP  PIP  MCP        TGE        Opposition   X 10 X 10    Finger Adb/add    X 15 X 15   Digit Extension X 20  X 20 X 20 X 20   Table top ext stretch 30 sec x 4       Digi-Flex Green x 20  Green x 20 Green x 20 Green x 20   theraputty  Intrinsic pushes Red  2 rows  Yellow  2 rows Red  2 rows Red  2 rows   Hand Power Pro Red RB x 20   Red RB x 20 Red RB x 20   Flex bar  Bends  Twist  Bottle cap Yellow  X 20  X 20  X 20  Yellow  X 20  X 20 Yellow  X 20  X 20  X 20 Yellow  X 20  X 20  X 20   Prayer stretch        Towel scrunches X 20   X 20 X 20   Dowel supination stretch        Intrinsic plus Black Foam  X 20  Blue/Black Foam  X 20  Black Foam  X 20   Wrist AROM 4# flex/ext stretch  3# flex/ext stretch 30 sec x 3 4# flex/ext stretch 30 sec x 3 4# flex/ext stretch 30 sec x 3   Ther Activity 3/30/2023  3/20/2023 3/23/2023 3/27/2023   Tension Pin Pom Pom Green tension pin all poms  Red and green tension pin all poms Green tension pin all poms Green tension pin all poms   Grooved Peg Board   All in/out  4:20             Ball w/beads        [facility] board        Modalities 3/30/2023  3/20/2023 3/23/2023 3/27/2023   Ultrasound        CP        MH Performed post w/digits in flexion  Performed post w/digits in flexion Performed post w/digits in flexion Performed post w/digits in flexion

## 2023-03-30 ENCOUNTER — OFFICE VISIT (OUTPATIENT)
Dept: OCCUPATIONAL THERAPY | Facility: CLINIC | Age: 73
End: 2023-03-30

## 2023-03-30 DIAGNOSIS — S62.242D CLOSED DISPLACED FRACTURE OF SHAFT OF FIRST METACARPAL BONE OF LEFT HAND WITH ROUTINE HEALING, SUBSEQUENT ENCOUNTER: Primary | ICD-10-CM

## 2023-04-03 ENCOUNTER — OFFICE VISIT (OUTPATIENT)
Dept: OCCUPATIONAL THERAPY | Facility: CLINIC | Age: 73
End: 2023-04-03

## 2023-04-03 DIAGNOSIS — S62.242D CLOSED DISPLACED FRACTURE OF SHAFT OF FIRST METACARPAL BONE OF LEFT HAND WITH ROUTINE HEALING, SUBSEQUENT ENCOUNTER: Primary | ICD-10-CM

## 2023-04-03 NOTE — PROGRESS NOTES
Daily Note     Today's date: 4/3/2023  Patient name: Henri Christine  : 1950  MRN: [id number]  Referring provider: Omar Leonardo MD  Dx:   Encounter Diagnosis     ICD-10-CM    1  Closed displaced fracture of shaft of first metacarpal bone of left hand with routine healing, subsequent encounter  [id number] [id number]                      Subjective: I don't know why it won't loosen up  It's so tight  Objective: See treatment diary below      Assessment: Tolerated treatment well  Patient demonstrated fatigue post treatment, exhibited good technique with therapeutic exercises and would benefit from continued OT  Increase pain with aggressive range of motion  Pt presents with increase PROM vs AROM  Plan: Continue per plan of care  Progress treatment as tolerated         Precautions s/p Left Hand Crush  Initial Evaluation completed on: 2023  Re-Evaluation needs to be completed before: 2023  Insurance: Medicare  FOTO: 2023  Auth Visits: N/A          Manuals 3/30/2023 4/3/2023   3/27/2023   Scar Massage        Digit Extension Stretch 3' 3'   5'   Composite Fist Stretch X 5 X 5   X 5   jt mobs 4' 3'   5'   Intrinsic stretches 3' 4'   3'   KT tape        Wrist and digit PROM 10' 10'   7'   Neuro Re-Ed  3/30/2023 4/3/2023   3/27/2023                                   Ther Ex 3/30/2023 4/3/2023   3/27/2023   Blocking  DIP  PIP  MCP        TGE        Opposition        Finger Adb/add     X 15   Digit Extension X 20 X 20   X 20   Table top ext stretch 30 sec x 4 30 sec x 4      Digi-Flex Green x 20 Green x 20   Green x 20   theraputty  Intrinsic pushes Red  2 rows Red  2 rows   Red  2 rows   Hand Power Pro Red RB x 20 Red RB x 20   Red RB x 20   Flex bar  Bends  Twist  Bottle cap Yellow  X 20  X 20  X 20 Yellow  X 20  X 20  X 20   Yellow  X 20  X 20  X 20   Prayer stretch  20 sec x 4      Towel scrunches X 20    X 20   Power Web    teracotta  X 20      Intrinsic plus Black Foam  X 20 Black Foam  X 20   Black Foam  X 20   Wrist AROM 4# flex/ext stretch 4# flex/ext stretch   4# flex/ext stretch 30 sec x 3   Ther Activity 3/30/2023 4/3/2023   3/27/2023   Tension Pin Pom Pom Green tension pin all poms GreenTP   Green tension pin all poms   Grooved Peg Board                Ball w/beads        [employer]        Modalities 3/30/2023 4/3/2023   3/27/2023   Ultrasound        CP         Performed post w/digits in flexion Performed post w/digits in flexion   Performed post w/digits in flexion

## 2023-04-04 NOTE — PROGRESS NOTES
Daily Note     Today's date: 2023  Patient name: Slick Turner  : 1950  MRN: [id number]  Referring provider: Bisi Carter MD  Dx:   Encounter Diagnosis     ICD-10-CM    1  Closed displaced fracture of shaft of first metacarpal bone of left hand with routine healing, subsequent encounter  [id number] [id number]                      Subjective: You loosen them up and they tighten right up  Objective: See treatment diary below      Assessment: Tolerated treatment well  Patient demonstrated fatigue post treatment, exhibited good technique with therapeutic exercises and would benefit from continued OT  Increase PROM noted, pt unable to maintain with AROM  Increase wrist tightness decreased post tx  Pt reports he can do more with his wrist then previously  Plan: Continue per plan of care  Progress treatment as tolerated         Precautions s/p Left Hand Crush  Initial Evaluation completed on: 2023  Re-Evaluation needs to be completed before: 2023  Insurance: Medicare  FOTO: 2023  Auth Visits: N/A          Manuals 3/30/2023 4/3/2023 2023  3/27/2023   Scar Massage        Digit Extension Stretch 3' 3' 3'  5'   Composite Fist Stretch X 5 X 5 X 5  X 5   jt mobs 4' 3' 3'  5'   Intrinsic stretches 3' 4' 4'  3'   KT tape        Wrist and digit PROM 10' 10' 10'  7'   Neuro Re-Ed  3/30/2023 4/3/2023 2023  3/27/2023                                   Ther Ex 3/30/2023 4/3/2023 2023  3/27/2023   Blocking  DIP  PIP  MCP        TGE        Opposition        Finger Adb/add     X 15   Digit Extension X 20 X 20 X 20  X 20   Table top ext stretch 30 sec x 4 30 sec x 4 30 sec x 4     Digi-Flex Green x 20 Green x 20 Green x 20  Green x 20   theraputty  Intrinsic pushes Red  2 rows Red  2 rows Red 2 rows  Red  2 rows   Hand Power Pro Red RB x 20 Red RB x 20 Red RB x 20  Red RB x 20   Flex bar  Bends  Twist  Bottle cap Yellow  X 20  X 20  X 20 Yellow  X 20  X 20  X 20 Yellow  X 20  X 20  X 20 Yellow  X 20  X 20  X 20   Prayer stretch  20 sec x 4 20 sec x 4     Towel scrunches X 20    X 20   Power Web    teracotta  X 20 teracotta  X 20     Intrinsic plus Black Foam  X 20 Black Foam  X 20 Black foam  X 20  Black Foam  X 20   Wrist AROM 4# flex/ext stretch 4# flex/ext stretch 4# flex/ext stretch  4# flex/ext stretch 30 sec x 3   Ther Activity 3/30/2023 4/3/2023 4/6/2023  3/27/2023   Tension Pin Pom Pom Green tension pin all poms GreenTP Green TP pin all poms  Green tension pin all poms   Grooved Peg Board                Ball w/beads        [employer]        Modalities 3/30/2023 4/3/2023 4/6/2023  3/27/2023   Ultrasound        CP        MH Performed post w/digits in flexion Performed post w/digits in flexion Performed post tx w/ digits in flexion  Performed post w/digits in flexion

## 2023-04-06 ENCOUNTER — OFFICE VISIT (OUTPATIENT)
Dept: OCCUPATIONAL THERAPY | Facility: CLINIC | Age: 73
End: 2023-04-06

## 2023-04-06 DIAGNOSIS — S62.242D CLOSED DISPLACED FRACTURE OF SHAFT OF FIRST METACARPAL BONE OF LEFT HAND WITH ROUTINE HEALING, SUBSEQUENT ENCOUNTER: Primary | ICD-10-CM

## 2023-04-24 ENCOUNTER — OFFICE VISIT (OUTPATIENT)
Dept: OCCUPATIONAL THERAPY | Facility: CLINIC | Age: 73
End: 2023-04-24

## 2023-04-24 DIAGNOSIS — S62.242D CLOSED DISPLACED FRACTURE OF SHAFT OF FIRST METACARPAL BONE OF LEFT HAND WITH ROUTINE HEALING, SUBSEQUENT ENCOUNTER: Primary | ICD-10-CM

## 2023-04-24 NOTE — PROGRESS NOTES
Daily Note     Today's date: 2023  Patient name: Lico Lakhani  : 1950  MRN: [id number]  Referring provider: Jose Miguel Garrido MD  Dx:   Encounter Diagnosis     ICD-10-CM    1  Closed displaced fracture of shaft of first metacarpal bone of left hand with routine healing, subsequent encounter  [id number]                      Subjective: I've been working on it at home  Objective: See treatment diary below      Assessment: Tolerated treatment well  Patient exhibited good technique with therapeutic exercises and would benefit from continued OT  Pt able to achieve 3/4 active fist closure post tx  Full PROM fist closure post tx  Plan: Continue per plan of care  Progress treatment as tolerated         Precautions s/p Left Hand Crush  Initial Evaluation completed on: 2023  Re-Evaluation needs to be completed before: 2023  Insurance: Medicare  FOTO: 2023  Auth Visits: N/A          Manuals 2023   Scar Massage        Digit Extension Stretch 3' 3' 3'  3'   Composite Fist Stretch X 5 X 5 X 5  X 5   jt mobs digits 3' 3' 3'  3'   Intrinsic stretches 4' 4' 4'  4'   KT tape        Wrist and digit PROM 10' 10' 10'  10'   Neuro Re-Ed  2023                                   Ther Ex 2023   Blocking  DIP  PIP  MCP        TGE        Opposition        Composite fist stretch X 5 X 5 X 5  X 5   Digit Extension X 20 X 20 X 20  X 20   Table top ext stretch 30 sec x 4 30 sec x 4   30 sec x 5  30 sec x 4   Digi-Flex Blue x 20 Blue x 20 Blue x 20  Blue x 20   theraputty  Intrinsic pushes Red 2 rows Red 2 rows Red 2 rows  Red 2 rows   Hand Power Pro Blue RB x 20 Blue RB x 20 Blue RB x 20  Blue RB x 20   Flex bar  Bends  Twist  Bottle cap Yellow  X 20  X 20  X 20 Yellow  X 20  X 20  X 20 Yellow  X 25  X 25  X 25  Yellow  X 20  X 20  X 20   Prayer stretch  20 sec x 4 20 sec x 4  20 sec x 4   Towel scrunches        Power Web    Thumb  Intrinsic plus teracotta  X 20  X 20  X 20 teracotta  X 20  X 20  X 20 teracotta  X 20  X 20  X 20  teracotta  X 20  X 20  X 20   Wrist AROM 5# flex/ext stretch x 4  5# flex/ext stretch x 4  5# flex/ext  Stretch x 4   Ther Activity 4/17/2023 4/20/2023 4/24/2023 4/13/2023   Tension Pin Pom Pom Blue TP all poms Blue TP all poms Blue TP all poms  Blue TP all poms   Grooved Peg Board                Ball w/beads        [facility] board        Modalities 4/17/2023 4/20/2023 4/24/2023 4/13/2023   Ultrasound        CP        MH Performed post w/ digits in flexion Performed post w/digits in flexion Performed post w/digits in flexion  Performed post tx w/digits in flexion

## 2023-04-26 NOTE — PROGRESS NOTES
Daily Note     Today's date: 2023  Patient name: Travis Chin  : 1950  MRN: [id number]  Referring provider: Phyllis Matias MD  Dx:   Encounter Diagnosis     ICD-10-CM    1  Closed displaced fracture of shaft of first metacarpal bone of left hand with routine healing, subsequent encounter  [id number]                      Subjective: My hand was in cold water prior, maybe that made it stiff  Objective: See treatment diary below      Assessment: Tolerated treatment well  Patient demonstrated fatigue post treatment, exhibited good technique with therapeutic exercises and would benefit from continued OT  Reassessment completed by OTR, refer to same for details  Plan: Continue per plan of care  Progress treatment as tolerated         Precautions s/p Left Hand Crush  Initial Evaluation completed on: 2023  Re-Evaluation needs to be completed before: 2023  Insurance: Medicare  FOTO: 2023  Auth Visits: N/A          Manuals 2023   Scar Massage        Digit Extension Stretch 3' 3' 3' 3' 3'   Composite Fist Stretch X 5 X 5 X 5 X 5 X 5   jt mobs digits 3' 3' 3' 3' 3'   Intrinsic stretches 4' 4' 4' 4' 4'   KT tape        Wrist and digit PROM 10' 10' 10' 10' 10'   Neuro Re-Ed  2023                                   Ther Ex 2023   Blocking  DIP  PIP  MCP        TGE        Opposition        Composite fist stretch X 5 X 5 X 5 X 5 X 5   Digit Extension X 20 X 20 X 20 X 20 X 20   Table top ext stretch 30 sec x 4 30 sec x 4   30 sec x 5 30 sec x 5 30 sec x 4   Digi-Flex Blue x 20 Blue x 20 Blue x 20 Blue x 20 Blue x 20   theraputty  Intrinsic pushes  lg knob  sm knob Red 2 rows Red 2 rows Red 2 rows      Red 2 rows    X 10 cw/ccw  X 10 cw/ccw Red 2 rows   Hand Power Pro Blue RB x 20 Blue RB x 20 Blue RB x 20 Blue RB x 20 Blue RB x 20   Flex bar  Bends  Twist  Bottle cap Yellow  X 20  X 20  X 20 Yellow  X 20  X 20  X 20 Yellow  X 25  X 25  X 25 Yellow  X 25  X 25  X 25 Yellow  X 20  X 20  X 20   Prayer stretch  20 sec x 4 20 sec x 4 20 sec x 4 20 sec x 4   Towel scrunches        Power Web    Thumb  Intrinsic plus teracotta  X 20  X 20  X 20 teracotta  X 20  X 20  X 20 teracotta  X 20  X 20  X 20 teracotta  X 20  X 20  X 20 teracotta  X 20  X 20  X 20   Wrist AROM 5# flex/ext stretch x 4  5# flex/ext stretch x 4  5# flex/ext  Stretch x 4   Ther Activity 4/17/2023 4/20/2023 4/24/2023 4/27/2023 4/13/2023   Tension Pin Pom Pom Blue TP all poms Blue TP all poms Blue TP all poms Blue TP all poms Blue TP all poms   Grooved Peg Board                Ball w/beads        [facility] board        Modalities 4/17/2023 4/20/2023 4/24/2023 4/27/2023 4/13/2023   Ultrasound        CP        MH Performed post w/ digits in flexion Performed post w/digits in flexion Performed post w/digits in flexion  Performed post tx w/digits in flexion

## 2023-04-27 ENCOUNTER — OFFICE VISIT (OUTPATIENT)
Dept: OCCUPATIONAL THERAPY | Facility: CLINIC | Age: 73
End: 2023-04-27

## 2023-04-27 DIAGNOSIS — S62.242D CLOSED DISPLACED FRACTURE OF SHAFT OF FIRST METACARPAL BONE OF LEFT HAND WITH ROUTINE HEALING, SUBSEQUENT ENCOUNTER: Primary | ICD-10-CM

## 2023-04-27 NOTE — PROGRESS NOTES
OT Re-Evaluation     Today's date: 2023  Patient name: Pete Caal  : 1950  MRN: [id number]  Referring provider: Moises Hurtado MD  Dx:   Encounter Diagnosis     ICD-10-CM    1  Closed displaced fracture of shaft of first metacarpal bone of left hand with routine healing, subsequent encounter  S62 [id number]           Start Time: 1200  Stop Time: 1300  Total time in clinic (min): 60 minutes    Assessment  Assessment details: Patient presenting to OP OT services with a dx left hand crush injury sustained on 2023 when his hand was struck by a log splitter  Patient had I&D completed on 2023  Patient had recent follow-up with Ortho on 2023 and was placed in an HB radial gutter fabricated by OT  Patient reports sustaining fracture on left hand when he was splitting logs  Patient has follow-up wit Ortho on 02/15/2023  Patient is right hand dominant  As per ED note: 74 y o  male who presented to the ED after a crush injury to the left hand from a log splitter sustaining open long finger metacarpal fracture, open index finger metacarpal fracture, and open proximal phalanx fracture of the long finger  Pt was admitted to the orthopaedic service and taken to the OR on 1/3/23 for Left hand I&D, ORIF index and long finger Metacarpal fx, long finger proximal phalanx fx and wound closure  Prior to surgery the risks and benefits of surgery were explained and informed consent was obtained  Surgery went without complications and pt was discharged to the PACU in a stable condition and was transferred to the floor  On discharge date pt was cleared by PT and the medicine team and determined to be safe for discharge  Re-assessment completed on 2023  Patient has consistently attended OP OT services since start of care  Patient has made steady progress towards established goals   Patient demonstrating with increases in hand range of motion,  strength, pinch strength, fine motor coordination and decreases in swelling  Patient has not met all established goals and will benefit from continued OT services to maximize level of function  Patient had recent appointment with Ortho on 02/19/2023 with good progress noted  Patient returns to Ortho on 03/29/2023  Routine healing noted throughout incision sites  Re-assessment completed on 03/27/2023  Patient has consistently attended OP OT services since start of care  Patient has made steady progress towards established goals  Patient demonstrating with increases in hand and digit range of motion,  strength, pinch strength and decreases in swelling and pain  Patient has not met all established goals and will benefit from continued OT services to maximize level of function  Patient has follow-up with Ortho on 03/29/2023  Patient reports improvements with attending therapy  Patient continues to present with intrinsic tightness and decrease ability to complete composite fist      Re-assessment completed on 04/27/2023  Patient has consistently attended OP OT services since start of care  Patient has made steady progress towards established goals  Patient demonstrating with increases in composite fist, digit ROM,  strength, pinch strength and fine motor coordination and decreases in pain  Patient has not met all established goals and will benefit from continued OT services to maximize level of function  Patient is still unable to make full composite fist  Patient is demonstrating with 2 cm distance from distal palmar crease  Patient reports increased functional use at home for some functional activities such as buttoning shirts and tying shoes that were difficult before  Patient continues to report a 40% deficit in sensation throughout hand as well     Impairments: abnormal or restricted ROM, activity intolerance and impaired physical strength  Other impairment: functional use    Symptom irritability: moderateUnderstanding of Dx/Px/POC: good Prognosis: good  Prognosis details: STGs    Pt will increase  strength by 5-10#  - Met    Pt will increase digit ROM by 50%  - Met    Pt will demonstrate decrease in edema by 25%  - Met    Pt will report a decrease in sensation deficits by 25%  - Met    Independent with HEP  - Met    Pt will D/C splint wear  - Met    LTGs     Pt will increase  strength by an additional 5-10#  - Progressing    Pt will increase digit ROM to WNL  - Progressing    Pt will demonstrate decrease in edema by 50%  - Progressing    Pt will increase pinch strength by 3-5#  - Progressing    Pt will report an increase in ADL/IADL participation  - Progressing    Pt will report a decrease in sensation deficits by 50%   - Met            Plan  Plan details:     Patient would benefit from: custom splinting, OT eval and skilled occupational therapy  Referral necessary: Yes  Planned modality interventions: cryotherapy, ultrasound, unattended electrical stimulation and thermotherapy: hydrocollator packs  Planned therapy interventions: manual therapy, massage, patient education, self care, strengthening, stretching, therapeutic activities, fine motor coordination training, home exercise program, functional ROM exercises and therapeutic exercise  Frequency: 2x week  Duration in visits: 8  Duration in weeks: 4  Treatment plan discussed with: patient        Subjective Evaluation    History of Present Illness  Date of onset: 2023  Date of surgery: 1/3/2023  Mechanism of injury: surgery and trauma  Mechanism of injury: S/p crush injury of left hand          Not a recurrent problem   Quality of life: good    Pain  Current pain ratin  At best pain ratin  At worst pain ratin  Location: Left Hand  Quality: dull ache    Social Support  Lives with: spouse    Employment status: not working  Hand dominance: right      Diagnostic Tests  X-ray: abnormal  Treatments  Current treatment: immobilization and occupational therapy  Patient Goals  Patient goals for therapy: decreased edema, decreased pain, increased motion, increased strength and independence with ADLs/IADLs          Objective     Observations     Left Wrist/Hand   Positive for incision  Additional Observation Details  Patient presenting with a 5 cm incision along dorsal 2nd and 3rd metacarpal and 2 cm along IP of 4th digit on the dorsal aspect  Patient presenting with steri-stripes along both incisions       Routine healing noted with minimal scar adhesions    Neurological Testing     Sensation     Wrist/Hand   Left   Diminished: light touch    Right   Intact: light touch    Additional Neurological Details  Patient reports sensation deficits along dorsal aspect of left hand along 2nd and 3rd metacarpal    Improvement in sensation deficits noted since start of care    Patient reports an improvement of 60% in sensation deficits since start of care    Active Range of Motion     Left Wrist   Wrist flexion: 25 degrees   Wrist extension: 40 degrees   Radial deviation: 10 degrees   Ulnar deviation: 20 degrees     Right Wrist   Normal active range of motion    Left Thumb   Opposition: WNL    Right Thumb   Opposition: WNL    Left Digits    Flexion   Index     MCP: 66    PIP: 70    DIP: 35  Middle     MCP: 55    PIP: 75    DIP: 45  Ring     MCP: 60    PIP: 80    DIP: 50  Little     MCP: 60    PIP: 60    DIP: 55    Additional Active Range of Motion Details  Patient demonstrating with slight increased left wrist ROM compared to last assessment  Soft fist with 8 cm from tips of digit to [facility]    Soft fist with 6 cm from tips of digit to [facility] on re-assessment on 02/27/2023    Soft fist with 5 cm from tips of digit to [facility] on re-assessment on 03/27/2023    Increased ROM at all DIP,PIP and MCPs since last assessment    Soft fist with 2 cm from tips of digit to [facility] on re-assessment on 03/27/2023    Strength/Myotome Testing     Left Wrist/Hand   Wrist extension: 3-  Wrist flexion: 3-  Radial deviation: 3-  Ulnar deviation: 3-     (2nd hand position)     Trial 1: 15    Thumb Strength  Key/Lateral Pinch     Trial 1: 6    Right Wrist/Hand   Normal wrist strength     (2nd hand position)     Trial 1: 55    Thumb Strength   Key/Lateral Pinch     Trial 1: 14    Additional Strength Details  Increase in  strength since start of care by 10#    Swelling     Left Wrist/Hand   Middle     Proximal: 7 8 cm    Middle: 6 6 cm    Distal: 5 8 cm  Circumference MCP: 21 5 cm  Circumference wrist: 17 5 cm    Additional Swelling Details  Decreased swelling in left hand as compared to last assessment    Right Wrist/Hand   Middle     Proximal: 7 cm    Middle: 6 5 cm    Distal: 5 2 cm  Circumference MCP: 21 2 cm  Circumference wrist: 17 5 cm  Neuro Exam:     Functional outcomes   Left 9 peg hole test: 0 (seconds)  Right 9 hole peg test: 24 79 (seconds)  Functional outcome comment: Re-assessment completed on 02/27/2023  R 49 66    Re-assessment completed on 04/27/2023  R 43 31

## 2023-04-28 NOTE — PROGRESS NOTES
Daily Note     Today's date: 2023  Patient name: Jazmine Barton  : 1950  MRN: [id number]  Referring provider: Oksana Pedro MD  Dx:   Encounter Diagnosis     ICD-10-CM    1  Closed displaced fracture of shaft of first metacarpal bone of left hand with routine healing, subsequent encounter  S33 272R                      Subjective: It hurts today  Objective: See treatment diary below      Assessment: Tolerated treatment well  Patient demonstrated fatigue post treatment, exhibited good technique with therapeutic exercises and would benefit from continued OT  Increase active fist closure noted pre tx and post tx  Increase fist closure noted from previous tx session  Denied pain post tx  Plan: Continue per plan of care  Progress treatment as tolerated         Precautions s/p Left Hand Crush  Initial Evaluation completed on: 2023  Re-Evaluation needs to be completed before: 2023  Insurance: Medicare  FOTO: 2023  Auth Visits: N/A          Manuals 2023   Scar Massage        Digit Extension Stretch 3' 3' 3' 3' 3'   Composite Fist Stretch X 5 X 5 X 5 X 5 X 5   jt mobs digits 3' 3' 3' 3' 3'   Intrinsic stretches 4' 4' 4' 4' 4'   KT tape        Wrist and digit PROM 10' 10' 10' 10' 10'   Neuro Re-Ed  2023                                   Ther Ex 2023   Blocking  DIP  PIP  MCP        TGE        Opposition        Composite fist stretch X 5 X 5 X 5 X 5 X 5   Digit Extension X 20 X 20 X 20 X 20 X 20   Table top ext stretch 30 sec x 4 30 sec x 4   30 sec x 5 30 sec x 5 30 sec x 5   Digi-Flex Blue x 20 Blue x 20 Blue x 20 Blue x 20 Blue x 20   theraputty  Intrinsic pushes  lg knob  sm knob Red 2 rows Red 2 rows Red 2 rows      Red 2 rows    X 10 cw/ccw  X 10 cw/ccw Red 2 rows    X 10 cw/ccw  X 10 cw/ccw   Hand Power Pro Blue RB x 20 Blue RB x 20 Blue RB x 20 Blue RB x 20 Blue RB x 20   Flex bar  Bends  Twist  Bottle cap Yellow  X 20  X 20  X 20 Yellow  X 20  X 20  X 20 Yellow  X 25  X 25  X 25 Yellow  X 25  X 25  X 25 Yellow  X 25  X 25  X 25   Prayer stretch  20 sec x 4 20 sec x 4 20 sec x 4 20 sec x 4   Towel scrunches        Power Web    Thumb  Intrinsic plus teracotta  X 20  X 20  X 20 teracotta  X 20  X 20  X 20 teracotta  X 20  X 20  X 20 teracotta  X 20  X 20  X 20 teracotta  X 20  X 20  X 20   Wrist AROM 5# flex/ext stretch x 4  5# flex/ext stretch x 4  5# flex/ext stretch x 4   Ther Activity 4/17/2023 4/20/2023 4/24/2023 4/27/2023 5/1/2023   Tension Pin Pom Pom Blue TP all poms Blue TP all poms Blue TP all poms Blue TP all poms Blue TP all poms   Grooved Peg Board                Ball w/beads        [facility] board        Modalities 4/17/2023 4/20/2023 4/24/2023 4/27/2023 5/1/2023   Ultrasound        CP        MH Performed post w/ digits in flexion Performed post w/digits in flexion Performed post w/digits in flexion  Performed post w/ digits in flexion

## 2023-05-01 ENCOUNTER — OFFICE VISIT (OUTPATIENT)
Dept: OCCUPATIONAL THERAPY | Facility: CLINIC | Age: 73
End: 2023-05-01

## 2023-05-01 DIAGNOSIS — S62.242D CLOSED DISPLACED FRACTURE OF SHAFT OF FIRST METACARPAL BONE OF LEFT HAND WITH ROUTINE HEALING, SUBSEQUENT ENCOUNTER: Primary | ICD-10-CM

## 2023-05-03 NOTE — PROGRESS NOTES
Daily Note     Today's date: 5/3/2023  Patient name: Crys Anderson  : 1950  MRN: [id number]  Referring provider: Laural Hatchet, MD  Dx:   Encounter Diagnosis     ICD-10-CM    1  Closed displaced fracture of shaft of first metacarpal bone of left hand with routine healing, subsequent encounter  [id number]                      Subjective: Very stiff today  Objective: See treatment diary below      Assessment: Tolerated treatment well  Patient demonstrated fatigue post treatment, exhibited good technique with therapeutic exercises and would benefit from continued OT  Small gains in active full fist closure noted post tx  Plan: Continue per plan of care  Progress treatment as tolerated         Precautions s/p Left Hand Crush  Initial Evaluation completed on: 2023  Re-Evaluation needs to be completed before: 2023  Insurance: Medicare  FOTO: 2023  Auth Visits: N/A          Manuals 2023   Scar Massage        Digit Extension Stretch 3'  3' 3' 3'   Composite Fist Stretch X 5  X 5 X 5 X 5   jt mobs digits 3'  3' 3' 3'   Intrinsic stretches 4'  4' 4' 4'   KT tape        Wrist and digit PROM 10'  10' 10' 10'   Neuro Re-Ed  2023                                   Ther Ex 2023   Blocking  DIP  PIP  MCP        TGE        Opposition        Composite fist stretch X 5  X 5 X 5 X 5   Digit Extension X 20  X 20 X 20 X 20   Table top ext stretch 30 sec x 5  30 sec x 5 30 sec x 5 30 sec x 5   Digi-Flex Blue x 25  Blue x 20 Blue x 20 Blue x 20   theraputty  Intrinsic pushes  lg knob  sm knob Red  2 rows    X 10 cw/ccw  X 10 cw/ccw  Red 2 rows      Red 2 rows    X 10 cw/ccw  X 10 cw/ccw Red 2 rows    X 10 cw/ccw  X 10 cw/ccw   Hand Power Pro Blue RB x 20  Blue RB x 20 Blue RB x 20 Blue RB x 20   Flex bar  Bends  Twist  Bottle cap Yellow  X 25  X 25  X 25  Yellow  X 25  X 25  X 25 Yellow  X 25  X 25  X 25 Yellow  X 25  X 25  X 25   Prayer stretch 20 sec x 4  20 sec x 4 20 sec x 4 20 sec x 4   Towel scrunches        Power Web    Thumb  Intrinsic plus teracotta  X 20  X 20  X 20  teracotta  X 20  X 20  X 20 teracotta  X 20  X 20  X 20 teracotta  X 20  X 20  X 20   Wrist AROM 5# flex/ext   Stretch x 4  5# flex/ext stretch x 4  5# flex/ext stretch x 4   Ther Activity 5/4/2023 4/24/2023 4/27/2023 5/1/2023   Tension Pin Pom Pom Blue TP all poms  Blue TP all poms Blue TP all poms Blue TP all poms   Grooved Peg Board 4:15                Ball w/beads        [facility] board        Modalities 5/4/2023 4/24/2023 4/27/2023 5/1/2023   Ultrasound        CP        MH Performed post w/digits in flexion  Performed post w/digits in flexion  Performed post w/ digits in flexion

## 2023-05-04 ENCOUNTER — OFFICE VISIT (OUTPATIENT)
Dept: OCCUPATIONAL THERAPY | Facility: CLINIC | Age: 73
End: 2023-05-04

## 2023-05-04 DIAGNOSIS — S62.242D CLOSED DISPLACED FRACTURE OF SHAFT OF FIRST METACARPAL BONE OF LEFT HAND WITH ROUTINE HEALING, SUBSEQUENT ENCOUNTER: Primary | ICD-10-CM

## 2023-05-04 NOTE — PROGRESS NOTES
Daily Note     Today's date: 2023  Patient name: Yadiel Khan  : 1950  MRN: [id number]  Referring provider: Charity Tran MD  Dx:   Encounter Diagnosis     ICD-10-CM    1  Closed displaced fracture of shaft of first metacarpal bone of left hand with routine healing, subsequent encounter  [id number]                      Subjective: I can't hold that 5# wt long  Objective: See treatment diary below      Assessment: Tolerated treatment well  Patient demonstrated fatigue post treatment, exhibited good technique with therapeutic exercises and would benefit from continued OT  Pt able to hold 5# DB for 20 sec before losing his   Able to make 3/4 active fist closure pre and post tx  Plan: Continue per plan of care  Progress treatment as tolerated         Precautions s/p Left Hand Crush  Initial Evaluation completed on: 2023  Re-Evaluation needs to be completed before: 2023  Insurance: Medicare  FOTO: 2023  Auth Visits: N/A          Manuals 2023   Scar Massage        Digit Extension Stretch 3' 3'   3'   Composite Fist Stretch X 5 X 5   X 5   jt mobs digits 3' 3'   3'   Intrinsic stretches 4' 4'   4'   KT tape        Wrist and digit PROM 10' 10'   10'   Neuro Re-Ed  2023                                   Ther Ex 2023   Blocking  DIP  PIP  MCP        TGE        Opposition        Composite fist stretch X 5 X 5   X 5   Digit Extension X 20 X 20   X 20   Table top ext stretch 30 sec x 5 30 sec x 5   30 sec x 5   Digi-Flex Blue x 25 Blue x 25   Blue x 20   theraputty  Intrinsic pushes  lg knob  sm knob Red  2 rows    X 10 cw/ccw  X 10 cw/ccw Red  2 rows    X 10 cw/ccw  X 10 cw/ccw   Red 2 rows    X 10 cw/ccw  X 10 cw/ccw   Hand Power Pro Blue RB x 20 Blue RB x 20   Blue RB x 20   Flex bar  Bends  Twist  Bottle cap Yellow  X 25  X 25  X 25 Yellow  X 25  X 25  X 25   Yellow  X 25  X 25  X 25   Prayer stretch 20 sec x 4    20 sec x 4   Towel scrunches        Power Web    Thumb  Intrinsic plus teracotta  X 20  X 20  X 20 teracotta  X 20  X 20  X 20   teracotta  X 20  X 20  X 20   Wrist AROM 5# flex/ext   Stretch x 4 5# flex/ext stretch 20 sec x 4   5# flex/ext stretch x 4   Ther Activity 5/4/2023 5/8/2023 5/1/2023   Tension Pin Pom Pom Blue TP all poms Blue TP all poms   Blue TP all poms   Grooved Peg Board 4:15                Ball w/beads        [facility] board        Modalities 5/4/2023 5/8/2023 4/24/2023 4/27/2023 5/1/2023   Ultrasound        CP        MH Performed post w/digits in flexion Performed post w/digits in flexion Performed post w/digits in flexion  Performed post w/ digits in flexion

## 2023-05-08 ENCOUNTER — OFFICE VISIT (OUTPATIENT)
Dept: OCCUPATIONAL THERAPY | Facility: CLINIC | Age: 73
End: 2023-05-08

## 2023-05-08 DIAGNOSIS — S62.242D CLOSED DISPLACED FRACTURE OF SHAFT OF FIRST METACARPAL BONE OF LEFT HAND WITH ROUTINE HEALING, SUBSEQUENT ENCOUNTER: Primary | ICD-10-CM

## 2023-05-11 ENCOUNTER — APPOINTMENT (OUTPATIENT)
Dept: OCCUPATIONAL THERAPY | Facility: CLINIC | Age: 73
End: 2023-05-11
Payer: MEDICARE

## 2023-05-12 NOTE — PROGRESS NOTES
Daily Note     Today's date: 2023  Patient name: Jm Condon  : 1950  MRN: [id number]  Referring provider: Miki Hernandez MD  Dx:   Encounter Diagnosis     ICD-10-CM    1  Closed displaced fracture of shaft of first metacarpal bone of left hand with routine healing, subsequent encounter  [id number]                      Subjective: It still feels stiff  Objective: See treatment diary below      Assessment: Tolerated treatment well  Patient demonstrated fatigue post treatment, exhibited good technique with therapeutic exercises and would benefit from continued OT  Pt demonstrated increase active L fist closure pre tx, stiffness continues primarily IF/MF and digit extension all digits  Plan: Continue per plan of care  Progress treatment as tolerated         Precautions s/p Left Hand Crush  Initial Evaluation completed on: 2023  Re-Evaluation needs to be completed before: 2023  Insurance: Medicare  FOTO: 2023  Auth Visits: N/A          Manuals 2023 2023 5/15/2023  2023   Scar Massage        Digit Extension Stretch 3' 3' 3'  3'   Composite Fist Stretch X 5 X 5 X 5  X 5   jt mobs digits 3' 3'   3'   Intrinsic stretches 4' 4' 4'  4'   KT tape        Wrist and digit PROM 10' 10' 10'  10'   Neuro Re-Ed  2023 2023 5/15/2023  2023                                   Ther Ex 2023 2023 5/15/2023  2023   Blocking  DIP  PIP  MCP        TGE        Wrist DB all directions   2# x 20     Composite fist stretch X 5 X 5 X 5  X 5   Digit Extension X 20 X 20 X 20  X 20   Table top ext stretch 30 sec x 5 30 sec x 5   30 sec x 5   Digi-Flex Blue x 25 Blue x 25 Blue x 25  Blue x 20   theraputty  Intrinsic pushes  lg knob  sm knob Red  2 rows    X 10 cw/ccw  X 10 cw/ccw Red  2 rows    X 10 cw/ccw  X 10 cw/ccw Red  2 rows    X 10 cw/ccw  X 10 cw/ccw  Red 2 rows    X 10 cw/ccw  X 10 cw/ccw   Hand Power Pro Blue RB x 20 Blue RB x 20 Blue RB x 20  Blue RB x 20 Flex bar  Bends  Twist  Bottle cap Yellow  X 25  X 25  X 25 Yellow  X 25  X 25  X 25 Yellow  X 25  X 25  X 25  Yellow  X 25  X 25  X 25   Prayer stretch 20 sec x 4  20 sec x 4  20 sec x 4   Towel scrunches        Power Web    Thumb  Intrinsic plus teracotta  X 20  X 20  X 20 teracotta  X 20  X 20  X 20 teracotta  X 25  X 25  X 25  teracotta  X 20  X 20  X 20   Wrist AROM 5# flex/ext   Stretch x 4 5# flex/ext stretch 20 sec x 4 5# flex/ext  Stretch 20 sec x 4  5# flex/ext stretch x 4   Ther Activity 5/4/2023 5/8/2023 5/15/2023  5/1/2023   Tension Pin Pom Pom Blue TP all poms Blue TP all poms Blue TP all poms  Blue TP all poms   Grooved Peg Board 4:15                Ball w/beads        [facility] board        Modalities 5/4/2023 5/8/2023 5/15/2023  5/1/2023   Ultrasound        CP         Performed post w/digits in flexion Performed post w/digits in flexion Performed post w/digits in flexion  Performed post w/ digits in flexion

## 2023-05-15 ENCOUNTER — OFFICE VISIT (OUTPATIENT)
Dept: OCCUPATIONAL THERAPY | Facility: CLINIC | Age: 73
End: 2023-05-15

## 2023-05-15 DIAGNOSIS — S62.242D CLOSED DISPLACED FRACTURE OF SHAFT OF FIRST METACARPAL BONE OF LEFT HAND WITH ROUTINE HEALING, SUBSEQUENT ENCOUNTER: Primary | ICD-10-CM

## 2023-05-18 ENCOUNTER — OFFICE VISIT (OUTPATIENT)
Dept: OCCUPATIONAL THERAPY | Facility: CLINIC | Age: 73
End: 2023-05-18

## 2023-05-18 DIAGNOSIS — S62.242D CLOSED DISPLACED FRACTURE OF SHAFT OF FIRST METACARPAL BONE OF LEFT HAND WITH ROUTINE HEALING, SUBSEQUENT ENCOUNTER: Primary | ICD-10-CM

## 2023-05-18 NOTE — PROGRESS NOTES
Daily Note     Today's date: 2023  Patient name: Jm Condon  : 1950  MRN: [id number]  Referring provider: Miki Hernandez MD  Dx:   Encounter Diagnosis     ICD-10-CM    1  Closed displaced fracture of shaft of first metacarpal bone of left hand with routine healing, subsequent encounter  [id number]                      Subjective: That's harder  Objective: See treatment diary below      Assessment: Tolerated treatment well  Patient demonstrated fatigue post treatment, exhibited good technique with therapeutic exercises and would benefit from continued OT  Pt tolerated increase DB resistance for static wrist flex/ext stretch, only able to hole 6# in flex for 10 sec  Tolerated increase theraputty resistance well  Pt continues to have increase stiff/tightness in digits despite aggressive PROM  Pt has more PROM than AROM of digits  Plan: Continue per plan of care  Progress treatment as tolerated         Precautions s/p Left Hand Crush  Initial Evaluation completed on: 2023  Re-Evaluation needs to be completed before: 2023  Insurance: Medicare  FOTO: 2023  Auth Visits: N/A          Manuals 2023 2023 5/15/2023 2023 2023   Scar Massage        Digit Extension Stretch 3' 3' 3' 3' 3'   Composite Fist Stretch X 5 X 5 X 5 X 5 X 5   jt mobs digits 3' 3'   3'   Intrinsic stretches 4' 4' 4' 4' 4'   KT tape        Wrist and digit PROM 10' 10' 10' 10' 10'   Neuro Re-Ed  2023 2023 5/15/2023 2023 2023                                   Ther Ex 2023 2023 5/15/2023 2023 2023   Blocking  DIP  PIP  MCP        TGE        Wrist DB all directions   2# x 20 2# x 20    Composite fist stretch X 5 X 5 X 5 X 5 X 5   Digit Extension X 20 X 20 X 20 X 20 X 20   Table top ext stretch 30 sec x 5 30 sec x 5   30 sec x 5   Digi-Flex Blue x 25 Blue x 25 Blue x 25 Blue x 25 Blue x 20   theraputty  Intrinsic pushes  lg knob  sm knob Red  2 rows    X 10 cw/ccw  X 10 cw/ccw Red  2 rows    X 10 cw/ccw  X 10 cw/ccw Red  2 rows    X 10 cw/ccw  X 10 cw/ccw Green  2 rows    X 10 cw/ccw  X 10 cw/ccw Red 2 rows    X 10 cw/ccw  X 10 cw/ccw   Hand Power Pro Blue RB x 20 Blue RB x 20 Blue RB x 20 Blue RB x 20 Blue RB x 20   Flex bar  Bends  Twist  Bottle cap Yellow  X 25  X 25  X 25 Yellow  X 25  X 25  X 25 Yellow  X 25  X 25  X 25 Yellow  X 25  X 25  X 25 Yellow  X 25  X 25  X 25   Prayer stretch 20 sec x 4  20 sec x 4  20 sec x 4   Towel scrunches        Power Web    Thumb  Intrinsic plus teracotta  X 20  X 20  X 20 teracotta  X 20  X 20  X 20 teracotta  X 25  X 25  X 25 teracotta  X 25  X 25  X 25 teracotta  X 20  X 20  X 20   Wrist AROM 5# flex/ext   Stretch x 4 5# flex/ext stretch 20 sec x 4 5# flex/ext  Stretch 20 sec x 4 6# flex 10 sec x 4  6# ext 20 sec x 4 5# flex/ext stretch x 4   Ther Activity 5/4/2023 5/8/2023 5/15/2023 5/18/2023 5/1/2023   Tension Pin Pom Pom Blue TP all poms Blue TP all poms Blue TP all poms Blue TP all poms Blue TP all poms   Grooved Peg Board 4:15                Ball w/beads        [facility] board        Modalities 5/4/2023 5/8/2023 5/15/2023 5/18/2023 5/1/2023   Ultrasound        CP        MH Performed post w/digits in flexion Performed post w/digits in flexion Performed post w/digits in flexion Performed post w/digits in flexion Performed post w/ digits in flexion

## 2023-05-19 NOTE — PROGRESS NOTES
Daily Note     Today's date: 2023  Patient name: Gracy Churchill  : 1950  MRN: [id number]  Referring provider: Maranda Fischer MD  Dx:   Encounter Diagnosis     ICD-10-CM    1  Closed displaced fracture of shaft of first metacarpal bone of left hand with routine healing, subsequent encounter  [id number] [id number]                      Subjective: I kept dropping small bolts this morning  Objective: See treatment diary below      Assessment: Tolerated treatment well  Patient demonstrated fatigue post treatment, exhibited good technique with therapeutic exercises and would benefit from continued OT  Increase digit extension tightness, increase pain with PROM  Increase opposition noted  Plan: Continue per plan of care  Progress treatment as tolerated         Precautions s/p Left Hand Crush  Initial Evaluation completed on: 2023  Re-Evaluation needs to be completed before: 2023  Insurance: Medicare  FOTO: 2023  Auth Visits: N/A          Manuals 2023 2023 5/15/2023 2023 2023   Scar Massage        Digit Extension Stretch 3' 3' 3' 3' 3'   Composite Fist Stretch X 5 X 5 X 5 X 5 X 5   jt mobs digits 3' 3'      Intrinsic stretches 4' 4' 4' 4' 4'   KT tape        Wrist and digit PROM 10' 10' 10' 10' 10'   Neuro Re-Ed  2023 2023 5/15/2023 2023 2023                                   Ther Ex 2023 2023 5/15/2023 2023 2023   Blocking  DIP  PIP  MCP        TGE        Wrist DB all directions   2# x 20 2# x 20 2# x 20   Composite fist stretch X 5 X 5 X 5 X 5 X 5   Digit Extension X 20 X 20 X 20 X 20 X 20   Table top ext stretch 30 sec x 5 30 sec x 5      Digi-Flex Blue x 25 Blue x 25 Blue x 25 Blue x 25 Blue x   theraputty  Intrinsic pushes  lg knob  sm knob Red  2 rows    X 10 cw/ccw  X 10 cw/ccw Red  2 rows    X 10 cw/ccw  X 10 cw/ccw Red  2 rows    X 10 cw/ccw  X 10 cw/ccw Green  2 rows    X 10 cw/ccw  X 10 cw/ccw Green  2 rows   Hand Power Pro [facility] RB x 20 Blue RB x 20 Blue RB x 20 Blue RB x 20 Blue RB x 20   Flex bar  Bends  Twist  Bottle cap Yellow  X 25  X 25  X 25 Yellow  X 25  X 25  X 25 Yellow  X 25  X 25  X 25 Yellow  X 25  X 25  X 25 Green  X 15  X 15  X 15     Prayer stretch 20 sec x 4  20 sec x 4     Towel scrunches        Power Web    Thumb  Intrinsic plus teracotta  X 20  X 20  X 20 teracotta  X 20  X 20  X 20 teracotta  X 25  X 25  X 25 teracotta  X 25  X 25  X 25 teracotta  X 25  X 25  X 25   Wrist AROM 5# flex/ext   Stretch x 4 5# flex/ext stretch 20 sec x 4 5# flex/ext  Stretch 20 sec x 4 6# flex 10 sec x 4  6# ext 20 sec x 4    Ther Activity 5/4/2023 5/8/2023 5/15/2023 5/18/2023 5/22/2023   Tension Pin Pom Pom Blue TP all poms Blue TP all poms Blue TP all poms Blue TP all poms Blue TP   Grooved Peg Board 4:15        Nuts and bolts board     8 on/off w/L hand   Ball w/beads        [facility] board        Modalities 5/4/2023 5/8/2023 5/15/2023 5/18/2023 5/22/2023   Ultrasound        CP        MH Performed post w/digits in flexion Performed post w/digits in flexion Performed post w/digits in flexion Performed post w/digits in flexion Performed post w/digits in flexion

## 2023-05-22 ENCOUNTER — OFFICE VISIT (OUTPATIENT)
Dept: OCCUPATIONAL THERAPY | Facility: CLINIC | Age: 73
End: 2023-05-22

## 2023-05-22 DIAGNOSIS — S62.242D CLOSED DISPLACED FRACTURE OF SHAFT OF FIRST METACARPAL BONE OF LEFT HAND WITH ROUTINE HEALING, SUBSEQUENT ENCOUNTER: Primary | ICD-10-CM

## 2023-05-25 ENCOUNTER — EVALUATION (OUTPATIENT)
Dept: OCCUPATIONAL THERAPY | Facility: CLINIC | Age: 73
End: 2023-05-25

## 2023-05-25 DIAGNOSIS — S62.242D CLOSED DISPLACED FRACTURE OF SHAFT OF FIRST METACARPAL BONE OF LEFT HAND WITH ROUTINE HEALING, SUBSEQUENT ENCOUNTER: Primary | ICD-10-CM

## 2023-05-25 NOTE — PROGRESS NOTES
OT Re-Evaluation     Today's date: 2023  Patient name: Sheila Partida  : 1950  MRN: [id number]  Referring provider: Mauricio Ascencio MD  Dx:   Encounter Diagnosis     ICD-10-CM    1  Closed displaced fracture of shaft of first metacarpal bone of left hand with routine healing, subsequent encounter  [id number]                      Assessment  Assessment details: Patient presenting to OP OT services with a dx left hand crush injury sustained on 2023 when his hand was struck by a log splitter  Patient had I&D completed on 2023  Patient had recent follow-up with Ortho on 2023 and was placed in an HB radial gutter fabricated by OT  Patient reports sustaining fracture on left hand when he was splitting logs  Patient has follow-up wit Ortho on 02/15/2023  Patient is right hand dominant  As per ED note: 74 y o  male who presented to the ED after a crush injury to the left hand from a log splitter sustaining open long finger metacarpal fracture, open index finger metacarpal fracture, and open proximal phalanx fracture of the long finger  Pt was admitted to the orthopaedic service and taken to the OR on 1/3/23 for Left hand I&D, ORIF index and long finger Metacarpal fx, long finger proximal phalanx fx and wound closure  Prior to surgery the risks and benefits of surgery were explained and informed consent was obtained  Surgery went without complications and pt was discharged to the PACU in a stable condition and was transferred to the floor  On discharge date pt was cleared by PT and the medicine team and determined to be safe for discharge  Re-assessment completed on 2023  Patient has consistently attended OP OT services since start of care  Patient has made steady progress towards established goals  Patient demonstrating with increases in hand range of motion,  strength, pinch strength, fine motor coordination and decreases in swelling   Patient has not met all established goals and will benefit from continued OT services to maximize level of function  Patient had recent appointment with Ortho on 02/19/2023 with good progress noted  Patient returns to Ortho on 03/29/2023  Routine healing noted throughout incision sites  Re-assessment completed on 03/27/2023  Patient has consistently attended OP OT services since start of care  Patient has made steady progress towards established goals  Patient demonstrating with increases in hand and digit range of motion,  strength, pinch strength and decreases in swelling and pain  Patient has not met all established goals and will benefit from continued OT services to maximize level of function  Patient has follow-up with Ortho on 03/29/2023  Patient reports improvements with attending therapy  Patient continues to present with intrinsic tightness and decrease ability to complete composite fist      Re-assessment completed on 04/27/2023  Patient has consistently attended OP OT services since start of care  Patient has made steady progress towards established goals  Patient demonstrating with increases in composite fist, digit ROM,  strength, pinch strength and fine motor coordination and decreases in pain  Patient has not met all established goals and will benefit from continued OT services to maximize level of function  Patient is still unable to make full composite fist  Patient is demonstrating with 2 cm distance from distal palmar crease  Patient reports increased functional use at home for some functional activities such as buttoning shirts and tying shoes that were difficult before  Patient continues to report a 40% deficit in sensation throughout hand as well  Re-assessment completed on 05/25/2023  Patient has consistently attended OP OT services since start of care  Patient has made steady progress towards established goals   Patient demonstrating with increases in  strength, pinch strength, wrist ROM, and functional use and decreases in swelling and pain  Patient has not met all established goals and will benefit from continued OT services to maximize level of function  Patient has follow-up with Ortho on 06/21/2023 Patient reports an increase in sensation to 90% compared to start of care  Patient demonstrating with improvements in fine motor coordination by 8 seconds compared to last assessment  Impairments: abnormal or restricted ROM, activity intolerance and impaired physical strength  Other impairment: functional use    Symptom irritability: moderateUnderstanding of Dx/Px/POC: good   Prognosis: good  Prognosis details: STGs    Pt will increase  strength by 5-10#  - Met    Pt will increase digit ROM by 50%  - Met    Pt will demonstrate decrease in edema by 25%  - Met    Pt will report a decrease in sensation deficits by 25%  - Met    Independent with HEP  - Met    Pt will D/C splint wear  - Met    LTGs     Pt will increase  strength by an additional 5-10#  - Progressing    Pt will increase digit ROM to WNL  - Progressing    Pt will demonstrate decrease in edema by 50%  - Progressing    Pt will increase pinch strength by 3-5#  - Progressing    Pt will report an increase in ADL/IADL participation  - Progressing    Pt will report a decrease in sensation deficits by 50%   - Met            Plan  Plan details:     Patient would benefit from: custom splinting, OT eval and skilled occupational therapy  Referral necessary: Yes  Planned modality interventions: cryotherapy, ultrasound, unattended electrical stimulation and thermotherapy: hydrocollator packs  Planned therapy interventions: manual therapy, massage, patient education, self care, strengthening, stretching, therapeutic activities, fine motor coordination training, home exercise program, functional ROM exercises and therapeutic exercise  Frequency: 2x week  Duration in visits: 8  Duration in weeks: 4  Treatment plan discussed with: patient        Subjective Evaluation    History of Present Illness  Date of onset: 2023  Date of surgery: 1/3/2023  Mechanism of injury: surgery and trauma  Mechanism of injury: S/p crush injury of left hand          Not a recurrent problem   Quality of life: good    Pain  Current pain ratin  At best pain ratin  At worst pain ratin  Location: Left Hand  Quality: dull ache    Social Support  Lives with: spouse    Employment status: not working  Hand dominance: right      Diagnostic Tests  X-ray: abnormal  Treatments  Current treatment: immobilization and occupational therapy  Patient Goals  Patient goals for therapy: decreased edema, decreased pain, increased motion, increased strength and independence with ADLs/IADLs          Objective     Observations     Left Wrist/Hand   Positive for incision  Additional Observation Details  Patient presenting with a 5 cm incision along dorsal 2nd and 3rd metacarpal and 2 cm along IP of 4th digit on the dorsal aspect  Patient presenting with steri-stripes along both incisions       Routine healing noted with minimal scar adhesions    Neurological Testing     Sensation     Wrist/Hand   Left   Diminished: light touch    Right   Intact: light touch    Additional Neurological Details  Patient reports sensation deficits along dorsal aspect of left hand along 2nd and 3rd metacarpal    Improvement in sensation deficits noted since start of care    Patient reports an improvement of 60% in sensation deficits since start of care 2023    Patient reports an improvement of 90% in sensation deficits since start of care on 2023    Active Range of Motion     Left Wrist   Wrist flexion: 35 degrees   Wrist extension: 45 degrees   Radial deviation: 15 degrees   Ulnar deviation: 20 degrees     Right Wrist   Normal active range of motion    Left Thumb   Opposition: WNL    Right Thumb   Opposition: WNL    Left Digits    Flexion   Index     MCP: 70    PIP: 65    DIP: 35  Middle     MCP: 60    PIP: 75    DIP: 45  Ring     MCP: 60    PIP: 75    DIP: 50  Little     MCP: 60    PIP: 65    DIP: 55    Additional Active Range of Motion Details  Patient demonstrating with slight increased left wrist ROM compared to last assessment  Soft fist with 8 cm from tips of digit to [facility]    Soft fist with 6 cm from tips of digit to [facility] on re-assessment on 02/27/2023    Soft fist with 5 cm from tips of digit to [facility] on re-assessment on 03/27/2023    Increased ROM at all DIP,PIP and MCPs since last assessment    Soft fist with 2 cm from tips of digit to [facility] on re-assessment on 04/27/2023    Continued soft fist of 2 cm at re-assessment completed on 05/25/2023    Strength/Myotome Testing     Left Wrist/Hand   Wrist extension: 3-  Wrist flexion: 3-  Radial deviation: 3-  Ulnar deviation: 3-     (2nd hand position)     Trial 1: 22    Thumb Strength  Key/Lateral Pinch     Trial 1: 13    Right Wrist/Hand   Normal wrist strength     (2nd hand position)     Trial 1: 55    Thumb Strength   Key/Lateral Pinch     Trial 1: 14    Additional Strength Details  Increase in  strength since start of care by 7# since last assesment    Swelling     Left Wrist/Hand   Middle     Proximal: 7 5 cm    Middle: 7 cm    Distal: 6 cm  Circumference MCP: 21 5 cm  Circumference wrist: 18 cm    Additional Swelling Details  Decreased in proximal MF swelling compared to last session    Right Wrist/Hand   Middle     Proximal: 7 cm    Middle: 6 5 cm    Distal: 5 2 cm  Circumference MCP: 21 2 cm  Circumference wrist: 17 5 cm  Neuro Exam:     Functional outcomes   Left 9 peg hole test: 0 (seconds)  Right 9 hole peg test: 24 79 (seconds)  Functional outcome comment: Re-assessment completed on 02/27/2023  R 49 66    Re-assessment completed on 04/27/2023  R 43 92    Re-assessment completed on 05/25/2023  R 38 23            Precautions s/p Left Hand Crush  Initial Evaluation completed on: 01/25/2023  Re-Evaluation needs to be completed before: 02/25/2023  Insurance: Medicare  FOTO: 01/25/2023  Auth Visits: N/A          Manuals 05/25/2023       Scar Massage        Digit Extension Stretch 3'       Composite Fist Stretch 30 sec x 3       jt mobs digits 3'       Intrinsic stretches 4'       KT tape        Wrist and digit PROM 10'               Neuro Re-Ed  05/25/2023                                       Ther Ex 05/25/2023       Blocking  DIP  PIP  MCP        TGE        Wrist DB all directions 2#  X 20       Composite fist stretch        Digit Extension        Table top ext stretch        Digi-Flex Blue x 25       theraputty  Intrinsic pushes  lg knob  sm knob Red  2 rows         Hand Power Pro Blue RB x 20       Flex bar  Bends  Twist  Bottle cap Green  X 25  X 25  X 25       Prayer stretch        Towel scrunches        Power Web    Thumb  Intrinsic plus Teracotta  X 20  X 20  X 20       Wrist AROM 6# flex/ext   Stretch x 4               Ther Activity 05/25/2023       Tension Pin Pom Pom Blue TP all poms       Grooved Peg Board        Nuts and bolts board        Ball w/beads        [facility] board                Modalities 05/25/2023       Ultrasound        CP         Performed post w/digits in flexion

## 2023-05-27 ENCOUNTER — APPOINTMENT (OUTPATIENT)
Dept: LAB | Facility: CLINIC | Age: 73
End: 2023-05-27

## 2023-05-27 DIAGNOSIS — Z79.01 LONG TERM (CURRENT) USE OF ANTICOAGULANTS: ICD-10-CM

## 2023-05-27 LAB
ANION GAP SERPL CALCULATED.3IONS-SCNC: 2 MMOL/L (ref 4–13)
BUN SERPL-MCNC: 14 MG/DL (ref 5–25)
CALCIUM SERPL-MCNC: 8.8 MG/DL (ref 8.3–10.1)
CHLORIDE SERPL-SCNC: 109 MMOL/L (ref 96–108)
CO2 SERPL-SCNC: 28 MMOL/L (ref 21–32)
CREAT SERPL-MCNC: 1.02 MG/DL (ref 0.6–1.3)
ERYTHROCYTE [DISTWIDTH] IN BLOOD BY AUTOMATED COUNT: 13 % (ref 11.6–15.1)
GFR SERPL CREATININE-BSD FRML MDRD: 72 ML/MIN/1.73SQ M
GLUCOSE P FAST SERPL-MCNC: 95 MG/DL (ref 65–99)
HCT VFR BLD AUTO: 40.8 % (ref 36.5–49.3)
HGB BLD-MCNC: 13.2 G/DL (ref 12–17)
MCH RBC QN AUTO: 30.8 PG (ref 26.8–34.3)
MCHC RBC AUTO-ENTMCNC: 32.4 G/DL (ref 31.4–37.4)
MCV RBC AUTO: 95 FL (ref 82–98)
PLATELET # BLD AUTO: 243 THOUSANDS/UL (ref 149–390)
PMV BLD AUTO: 10.3 FL (ref 8.9–12.7)
POTASSIUM SERPL-SCNC: 4.5 MMOL/L (ref 3.5–5.3)
RBC # BLD AUTO: 4.28 MILLION/UL (ref 3.88–5.62)
SODIUM SERPL-SCNC: 139 MMOL/L (ref 135–147)
WBC # BLD AUTO: 4.59 THOUSAND/UL (ref 4.31–10.16)

## 2023-06-13 NOTE — PROGRESS NOTES
Daily Note     Today's date: 2023  Patient name: Robert Marie  : 1950  MRN: [id number]  Referring provider: Efra Wolff MD  Dx:   Encounter Diagnosis     ICD-10-CM    1  Closed displaced fracture of shaft of first metacarpal bone of left hand with routine healing, subsequent encounter  [id number]                      Subjective: I'm doing good  It's still stiff  Objective: See treatment diary below      Assessment: Tolerated treatment well  Patient demonstrated fatigue post treatment, exhibited good technique with therapeutic exercises and would benefit from continued OT  Pt reports being able to use left hand without difficulty, he can even carry heavy items with left hand  Pt able to make 3/4 active fist closure, full passive fist closure noted  No pain pre or post tx  Pt to see MD await new orders  Plan: Anticipate d/c       Precautions s/p Left Hand Crush  Initial Evaluation completed on: 2023  Re-Evaluation needs to be completed before: 2023  Insurance: Medicare  FOTO: 2023  Auth Visits: N/A          Manuals 2023 6/15/2023      Scar Massage        Digit Extension Stretch 3' 5'      Composite Fist Stretch 30 sec x 3 30 sec x 3      jt mobs digits 3'       Intrinsic stretches 4'       KT tape        Wrist and digit PROM 10' 10'              Neuro Re-Ed  2023 6/15/2023                                      Ther Ex 2023 6/15/2023      Blocking  DIP  PIP  MCP        TGE        Wrist DB all directions 2#  X 20 2#  X 20      Composite fist stretch        Digit Extension        Table top ext stretch        Digi-Flex Blue x 25 Blue x 25      theraputty  Intrinsic pushes  lg knob  sm knob Red  2 rows         Hand Power Pro Blue RB x 20 Blue RB x 20      Flex bar  Bends  Twist  Bottle cap Green  X 25  X 25  X 25 Green  X 25  X 25  X 25      Prayer stretch        Towel scrunches        Power Web    Thumb  Intrinsic plus Teracotta  X 20  X 20  X 20 teracotta  X 20  X 20  X 20      Wrist AROM 6# flex/ext   Stretch x 4 7# flex/ext stretch x 4              Ther Activity 05/25/2023 6/15/2023      Tension Pin Pom Pom Blue TP all poms Blue TP all poms      Grooved Peg Board        Nuts and bolts board        Ball w/beads        [facility] board                Modalities 05/25/2023 6/15/2023      Ultrasound        CP         Performed post w/digits in flexion Performed post w/digits in flexion

## 2023-06-15 ENCOUNTER — OFFICE VISIT (OUTPATIENT)
Dept: OCCUPATIONAL THERAPY | Facility: CLINIC | Age: 73
End: 2023-06-15
Payer: MEDICARE

## 2023-06-15 DIAGNOSIS — S62.242D CLOSED DISPLACED FRACTURE OF SHAFT OF FIRST METACARPAL BONE OF LEFT HAND WITH ROUTINE HEALING, SUBSEQUENT ENCOUNTER: Primary | ICD-10-CM

## 2023-06-15 PROCEDURE — 97110 THERAPEUTIC EXERCISES: CPT

## 2023-06-15 PROCEDURE — 97140 MANUAL THERAPY 1/> REGIONS: CPT

## 2023-06-21 ENCOUNTER — OFFICE VISIT (OUTPATIENT)
Dept: OBGYN CLINIC | Facility: HOSPITAL | Age: 73
End: 2023-06-21
Payer: MEDICARE

## 2023-06-21 VITALS
HEART RATE: 67 BPM | HEIGHT: 71 IN | BODY MASS INDEX: 28.45 KG/M2 | WEIGHT: 203.2 LBS | DIASTOLIC BLOOD PRESSURE: 81 MMHG | SYSTOLIC BLOOD PRESSURE: 149 MMHG

## 2023-06-21 DIAGNOSIS — M25.642 JOINT STIFFNESS OF HAND, LEFT: Primary | ICD-10-CM

## 2023-06-21 DIAGNOSIS — S62.242A CLOSED DISPLACED FRACTURE OF SHAFT OF FIRST METACARPAL BONE OF LEFT HAND, INITIAL ENCOUNTER: ICD-10-CM

## 2023-06-21 PROCEDURE — 99213 OFFICE O/P EST LOW 20 MIN: CPT | Performed by: PHYSICIAN ASSISTANT

## 2023-06-21 NOTE — PROGRESS NOTES
ASSESSMENT/PLAN:    Assessment:   S/P Irrigation and debridement of the left hand open index and long finger metacarpal fractures  (Skin, subcutaneous tissue, muscle, fascia, and bone)  Irrigation and debridement skin, subcutaneous tissue, fascia and tendon left hand ring finger proximal phalanx wound  - Left, Open reduction and internal fixation left hand index and long finger metacarpal fractures  Open reduction and internal fixation left hand long finger proximal phalanx fracture - Left, and Application short arm splint - Left on 1/3/2023      Plan: It was discussed with the patient to continue with his home exercises as he is doing well with his range of motion  He was advised that it can take some time for the stiffness to resolve  He will follow-up with us as needed    Follow Up:  As needed    To Do Next Visit:  Reevaluation  _____________________________________________________  CHIEF COMPLAINT:  Chief Complaint   Patient presents with   • Left Wrist - Follow-up     DOS- 1/3/23         SUBJECTIVE:  Ene Krueger is a 74 y o  male who presents for follow up regarding surgery 1/3/23 for open left index and long finger metacarpal fractures and open left long finger proximal phalanx fracture  He has been working on his range of motion and states that he has noted improvement since doing so  He states he is very happy with his surgery and overall progress and he is able to get back to his activities of daily living      PAST MEDICAL HISTORY:  Past Medical History:   Diagnosis Date   • Arthritis    • Atrial fibrillation ([address] )    • Cataract    • Glaucoma     Right eye   • Hypertension        PAST SURGICAL HISTORY:  Past Surgical History:   Procedure Laterality Date   • CAST APPLICATION Left 2/1/0656    Procedure: Application short arm splint;  Surgeon: Brian Hernandez MD;  Location: [facility];  Service: Orthopedics   • CHEST SURGERY      Unsure of surgery, pt had a trauma and had chest surgery anterior and L posterior   • FEMUR FRACTURE SURGERY Left    • HAND DEBRIDEMENT Left 1/3/2023    Procedure: Irrigation and debridement of the left hand open index and long finger metacarpal fractures  (Skin, subcutaneous tissue, muscle, fascia, and bone)  Irrigation and debridement skin, subcutaneous tissue, fascia and tendon left hand ring finger proximal phalanx wound ;  Surgeon: Skip Talley MD;  Location: [facility];  Service: Orthopedics   • HAND FRACTURE REPAIR Left 1/3/2023    Procedure: Open reduction and internal fixation left hand index and long finger metacarpal fractures  Open reduction and internal fixation left hand long finger proximal phalanx fracture;  Surgeon: Skip Talley MD;  Location: [facility];  Service: Orthopedics   • HIP FRACTURE SURGERY Left    • KNEE SURGERY Left        FAMILY HISTORY:  History reviewed  No pertinent family history      SOCIAL HISTORY:  Social History     Tobacco Use   • Smoking status: Never   • Smokeless tobacco: Never   Substance Use Topics   • Alcohol use: Yes     Comment: 12 drinks monthly   • Drug use: No       MEDICATIONS:    Current Outpatient Medications:   •  acetaminophen (TYLENOL) 325 mg tablet, Take 650 mg by mouth every 6 (six) hours as needed for mild pain, Disp: , Rfl:   •  AMLODIPINE BENZOATE PO, Take 2 5 mg by mouth daily, Disp: , Rfl:   •  apixaban (ELIQUIS) 5 mg, Take 1 tablet (5 mg total) by mouth 2 (two) times a day, Disp: 60 tablet, Rfl: 5  •  BENAZEPRIL HCL PO, Take 10 mg by mouth daily, Disp: , Rfl:   •  flecainide (TAMBOCOR) 50 mg tablet, Take 1 tablet (50 mg total) by mouth 2 (two) times a day, Disp: 60 tablet, Rfl: 5  •  latanoprost (XALATAN) 0 005 % ophthalmic solution, 1 drop daily at bedtime, Disp: , Rfl:   •  metoprolol succinate (TOPROL-XL) 25 mg 24 hr tablet, Take 1 tablet (25 mg total) by mouth daily, Disp: 60 tablet, Rfl: 5  •  Multiple Vitamins-Minerals (CENTRUM SILVER PO), Take by mouth, Disp: , Rfl:   •  pravastatin (PRAVACHOL) "20 mg tablet, Take 20 mg by mouth daily, Disp: , Rfl:     ALLERGIES:  Allergies   Allergen Reactions   • Demerol [Meperidine]      Unknown reaction        REVIEW OF SYSTEMS:  Pertinent items are noted in HPI  A comprehensive review of systems was negative  LABS:  HgA1c: No results found for: \"HGBA1C\"  BMP:   Lab Results   Component Value Date    CALCIUM 8 8 05/27/2023     05/05/2018    K 4 5 05/27/2023    CO2 28 05/27/2023     (H) 05/27/2023    BUN 14 05/27/2023    CREATININE 1 02 05/27/2023           _____________________________________________________  PHYSICAL EXAMINATION:  Vital signs: /81   Pulse 67   Ht 5' 11\" (1 803 m)   Wt 92 2 kg (203 lb 3 2 oz)   BMI 28 34 kg/m²   General: well developed and well nourished, alert, oriented times 3 and appears comfortable  Psychiatric: Normal  HEENT: Trachea Midline, No torticollis  Cardiovascular: No discernable arrhythmia  Pulmonary: No wheezing or stridor  Abdomen: No rebound or guarding  Extremities: No peripheral edema  Skin: No masses, erythema, lacerations, fluctation, ulcerations  Neurovascular: Sensation Intact to the Median, Ulnar, Radial Nerve, Motor Intact to the Median, Ulnar, Radial Nerve and Pulses Intact    MUSCULOSKELETAL EXAMINATION:  Left hand: Well healed surgicla incisions  No swelling redness induration, no open wounds  No TTP elicited  Stiffenss to index long ring and small fingers  Can passively range MP joints to 80 deg, IP joints to 90 deg   MP and PIP joints passively can achieve full extension  Able to flex extend DIP PIP and MCPJ of all fingers  2+ radial pulse hand warm perfused      _____________________________________________________  STUDIES REVIEWED:  No Additional studies were performed at today's visit      PROCEDURES PERFORMED:  No procedures performed today  "

## 2024-01-04 ENCOUNTER — APPOINTMENT (OUTPATIENT)
Dept: LAB | Facility: CLINIC | Age: 74
End: 2024-01-04
Payer: MEDICARE

## 2024-01-04 DIAGNOSIS — E78.2 MIXED HYPERLIPIDEMIA: ICD-10-CM

## 2024-01-04 DIAGNOSIS — Z79.01 LONG TERM (CURRENT) USE OF ANTICOAGULANTS: ICD-10-CM

## 2024-01-04 DIAGNOSIS — I10 ESSENTIAL HYPERTENSION: ICD-10-CM

## 2024-01-04 LAB
ALBUMIN SERPL BCP-MCNC: 4.5 G/DL (ref 3.5–5)
ALP SERPL-CCNC: 55 U/L (ref 34–104)
ALT SERPL W P-5'-P-CCNC: 19 U/L (ref 7–52)
ANION GAP SERPL CALCULATED.3IONS-SCNC: 13 MMOL/L
AST SERPL W P-5'-P-CCNC: 22 U/L (ref 13–39)
BILIRUB SERPL-MCNC: 0.87 MG/DL (ref 0.2–1)
BUN SERPL-MCNC: 18 MG/DL (ref 5–25)
CALCIUM SERPL-MCNC: 9.3 MG/DL (ref 8.4–10.2)
CHLORIDE SERPL-SCNC: 105 MMOL/L (ref 96–108)
CHOLEST SERPL-MCNC: 134 MG/DL
CO2 SERPL-SCNC: 25 MMOL/L (ref 21–32)
CREAT SERPL-MCNC: 1.09 MG/DL (ref 0.6–1.3)
ERYTHROCYTE [DISTWIDTH] IN BLOOD BY AUTOMATED COUNT: 11.9 % (ref 11.6–15.1)
GFR SERPL CREATININE-BSD FRML MDRD: 66 ML/MIN/1.73SQ M
GLUCOSE P FAST SERPL-MCNC: 88 MG/DL (ref 65–99)
HCT VFR BLD AUTO: 46.8 % (ref 36.5–49.3)
HDLC SERPL-MCNC: 38 MG/DL
HGB BLD-MCNC: 15.4 G/DL (ref 12–17)
LDLC SERPL CALC-MCNC: 81 MG/DL (ref 0–100)
MCH RBC QN AUTO: 31.3 PG (ref 26.8–34.3)
MCHC RBC AUTO-ENTMCNC: 32.9 G/DL (ref 31.4–37.4)
MCV RBC AUTO: 95 FL (ref 82–98)
NONHDLC SERPL-MCNC: 96 MG/DL
PLATELET # BLD AUTO: 238 THOUSANDS/UL (ref 149–390)
PMV BLD AUTO: 10.8 FL (ref 8.9–12.7)
POTASSIUM SERPL-SCNC: 4.8 MMOL/L (ref 3.5–5.3)
PROT SERPL-MCNC: 6.9 G/DL (ref 6.4–8.4)
RBC # BLD AUTO: 4.92 MILLION/UL (ref 3.88–5.62)
SODIUM SERPL-SCNC: 143 MMOL/L (ref 135–147)
TRIGL SERPL-MCNC: 75 MG/DL
WBC # BLD AUTO: 6.65 THOUSAND/UL (ref 4.31–10.16)

## 2024-01-04 PROCEDURE — 80053 COMPREHEN METABOLIC PANEL: CPT

## 2024-01-04 PROCEDURE — 80061 LIPID PANEL: CPT

## 2024-01-04 PROCEDURE — 85027 COMPLETE CBC AUTOMATED: CPT

## 2024-01-04 PROCEDURE — 36415 COLL VENOUS BLD VENIPUNCTURE: CPT

## 2024-07-15 ENCOUNTER — APPOINTMENT (OUTPATIENT)
Dept: LAB | Facility: CLINIC | Age: 74
End: 2024-07-15
Payer: MEDICARE

## 2024-07-15 DIAGNOSIS — Z79.899 ENCOUNTER FOR LONG-TERM (CURRENT) USE OF OTHER MEDICATIONS: ICD-10-CM

## 2024-07-15 LAB
ANION GAP SERPL CALCULATED.3IONS-SCNC: 8 MMOL/L (ref 4–13)
BUN SERPL-MCNC: 15 MG/DL (ref 5–25)
CALCIUM SERPL-MCNC: 9.5 MG/DL (ref 8.4–10.2)
CHLORIDE SERPL-SCNC: 102 MMOL/L (ref 96–108)
CO2 SERPL-SCNC: 30 MMOL/L (ref 21–32)
CREAT SERPL-MCNC: 1.07 MG/DL (ref 0.6–1.3)
ERYTHROCYTE [DISTWIDTH] IN BLOOD BY AUTOMATED COUNT: 12 % (ref 11.6–15.1)
GFR SERPL CREATININE-BSD FRML MDRD: 67 ML/MIN/1.73SQ M
GLUCOSE P FAST SERPL-MCNC: 96 MG/DL (ref 65–99)
HCT VFR BLD AUTO: 45.6 % (ref 36.5–49.3)
HGB BLD-MCNC: 14.8 G/DL (ref 12–17)
MCH RBC QN AUTO: 31 PG (ref 26.8–34.3)
MCHC RBC AUTO-ENTMCNC: 32.5 G/DL (ref 31.4–37.4)
MCV RBC AUTO: 95 FL (ref 82–98)
PLATELET # BLD AUTO: 302 THOUSANDS/UL (ref 149–390)
PMV BLD AUTO: 10.7 FL (ref 8.9–12.7)
POTASSIUM SERPL-SCNC: 4.9 MMOL/L (ref 3.5–5.3)
RBC # BLD AUTO: 4.78 MILLION/UL (ref 3.88–5.62)
SODIUM SERPL-SCNC: 140 MMOL/L (ref 135–147)
WBC # BLD AUTO: 7.8 THOUSAND/UL (ref 4.31–10.16)

## 2024-07-15 PROCEDURE — 85027 COMPLETE CBC AUTOMATED: CPT

## 2024-07-15 PROCEDURE — 36415 COLL VENOUS BLD VENIPUNCTURE: CPT

## 2024-07-15 PROCEDURE — 80048 BASIC METABOLIC PNL TOTAL CA: CPT

## 2024-11-06 ENCOUNTER — HOSPITAL ENCOUNTER (OUTPATIENT)
Dept: ULTRASOUND IMAGING | Facility: HOSPITAL | Age: 74
Discharge: HOME | End: 2024-11-06
Payer: MEDICARE

## 2024-11-06 DIAGNOSIS — B19.20 HEPATITIS C VIRUS INFECTION WITHOUT HEPATIC COMA, UNSPECIFIED CHRONICITY: ICD-10-CM

## 2024-11-06 PROCEDURE — 76705 ECHO EXAM OF ABDOMEN: CPT

## 2025-01-04 ENCOUNTER — APPOINTMENT (OUTPATIENT)
Dept: LAB | Facility: CLINIC | Age: 75
End: 2025-01-04
Payer: COMMERCIAL

## 2025-01-04 DIAGNOSIS — B19.20 HEPATITIS C VIRUS INFECTION WITHOUT HEPATIC COMA, UNSPECIFIED CHRONICITY: ICD-10-CM

## 2025-01-04 DIAGNOSIS — Z79.01 LONG TERM (CURRENT) USE OF ANTICOAGULANTS: ICD-10-CM

## 2025-01-04 DIAGNOSIS — I10 ESSENTIAL HYPERTENSION, MALIGNANT: ICD-10-CM

## 2025-01-04 DIAGNOSIS — E78.5 HYPERLIPIDEMIA, UNSPECIFIED HYPERLIPIDEMIA TYPE: ICD-10-CM

## 2025-01-04 LAB
AFP-TM SERPL-MCNC: 2.76 NG/ML (ref 0–9)
ALBUMIN SERPL BCG-MCNC: 4.5 G/DL (ref 3.5–5)
ALP SERPL-CCNC: 65 U/L (ref 34–104)
ALT SERPL W P-5'-P-CCNC: 13 U/L (ref 7–52)
ANION GAP SERPL CALCULATED.3IONS-SCNC: 8 MMOL/L (ref 4–13)
AST SERPL W P-5'-P-CCNC: 17 U/L (ref 13–39)
BILIRUB SERPL-MCNC: 1.09 MG/DL (ref 0.2–1)
BUN SERPL-MCNC: 14 MG/DL (ref 5–25)
CALCIUM SERPL-MCNC: 9.1 MG/DL (ref 8.4–10.2)
CHLORIDE SERPL-SCNC: 99 MMOL/L (ref 96–108)
CHOLEST SERPL-MCNC: 144 MG/DL (ref ?–200)
CO2 SERPL-SCNC: 29 MMOL/L (ref 21–32)
CREAT SERPL-MCNC: 1.2 MG/DL (ref 0.6–1.3)
CREAT UR-MCNC: 158.7 MG/DL
ERYTHROCYTE [DISTWIDTH] IN BLOOD BY AUTOMATED COUNT: 13.1 % (ref 11.6–15.1)
GFR SERPL CREATININE-BSD FRML MDRD: 58 ML/MIN/1.73SQ M
GLUCOSE P FAST SERPL-MCNC: 102 MG/DL (ref 65–99)
HCT VFR BLD AUTO: 44.4 % (ref 36.5–49.3)
HDLC SERPL-MCNC: 35 MG/DL
HGB BLD-MCNC: 14.3 G/DL (ref 12–17)
LDLC SERPL CALC-MCNC: 95 MG/DL (ref 0–100)
MCH RBC QN AUTO: 30 PG (ref 26.8–34.3)
MCHC RBC AUTO-ENTMCNC: 32.2 G/DL (ref 31.4–37.4)
MCV RBC AUTO: 93 FL (ref 82–98)
MICROALBUMIN UR-MCNC: 68.9 MG/L
MICROALBUMIN/CREAT 24H UR: 43 MG/G CREATININE (ref 0–30)
NONHDLC SERPL-MCNC: 109 MG/DL
PLATELET # BLD AUTO: 215 THOUSANDS/UL (ref 149–390)
PMV BLD AUTO: 10.3 FL (ref 8.9–12.7)
POTASSIUM SERPL-SCNC: 4.8 MMOL/L (ref 3.5–5.3)
PROT SERPL-MCNC: 7.2 G/DL (ref 6.4–8.4)
RBC # BLD AUTO: 4.76 MILLION/UL (ref 3.88–5.62)
SODIUM SERPL-SCNC: 136 MMOL/L (ref 135–147)
TRIGL SERPL-MCNC: 72 MG/DL (ref ?–150)
WBC # BLD AUTO: 7.53 THOUSAND/UL (ref 4.31–10.16)

## 2025-01-04 PROCEDURE — 36415 COLL VENOUS BLD VENIPUNCTURE: CPT

## 2025-01-04 PROCEDURE — 82105 ALPHA-FETOPROTEIN SERUM: CPT

## 2025-01-04 PROCEDURE — 80061 LIPID PANEL: CPT

## 2025-01-04 PROCEDURE — 80053 COMPREHEN METABOLIC PANEL: CPT

## 2025-01-04 PROCEDURE — 82043 UR ALBUMIN QUANTITATIVE: CPT

## 2025-01-04 PROCEDURE — 82570 ASSAY OF URINE CREATININE: CPT

## 2025-01-04 PROCEDURE — 85027 COMPLETE CBC AUTOMATED: CPT

## 2025-04-28 ENCOUNTER — APPOINTMENT (OUTPATIENT)
Dept: LAB | Facility: CLINIC | Age: 75
End: 2025-04-28
Attending: INTERNAL MEDICINE
Payer: COMMERCIAL

## 2025-04-28 DIAGNOSIS — R30.0 DYSURIA: ICD-10-CM

## 2025-04-28 PROCEDURE — 87181 SC STD AGAR DILUTION PER AGT: CPT

## 2025-04-28 PROCEDURE — 87077 CULTURE AEROBIC IDENTIFY: CPT

## 2025-04-28 PROCEDURE — 87086 URINE CULTURE/COLONY COUNT: CPT

## 2025-04-28 PROCEDURE — 87186 SC STD MICRODIL/AGAR DIL: CPT

## 2025-05-01 LAB — BACTERIA UR CULT: ABNORMAL

## 2025-06-10 ENCOUNTER — APPOINTMENT (INPATIENT)
Dept: RADIOLOGY | Facility: HOSPITAL | Age: 75
DRG: 871 | End: 2025-06-10
Payer: COMMERCIAL

## 2025-06-10 ENCOUNTER — APPOINTMENT (EMERGENCY)
Dept: RADIOLOGY | Facility: HOSPITAL | Age: 75
DRG: 871 | End: 2025-06-10
Payer: COMMERCIAL

## 2025-06-10 ENCOUNTER — HOSPITAL ENCOUNTER (INPATIENT)
Facility: HOSPITAL | Age: 75
LOS: 7 days | Discharge: HOME | DRG: 871 | End: 2025-06-17
Attending: FAMILY MEDICINE | Admitting: INTERNAL MEDICINE
Payer: COMMERCIAL

## 2025-06-10 ENCOUNTER — APPOINTMENT (EMERGENCY)
Dept: CT IMAGING | Facility: HOSPITAL | Age: 75
DRG: 871 | End: 2025-06-10
Payer: COMMERCIAL

## 2025-06-10 DIAGNOSIS — R19.7 DIARRHEA: ICD-10-CM

## 2025-06-10 DIAGNOSIS — J18.9 PNEUMONIA: Primary | ICD-10-CM

## 2025-06-10 DIAGNOSIS — R53.1 GENERALIZED WEAKNESS: ICD-10-CM

## 2025-06-10 DIAGNOSIS — I16.0 HYPERTENSIVE URGENCY: ICD-10-CM

## 2025-06-10 DIAGNOSIS — I48.92 ATRIAL FIB/FLUTTER, TRANSIENT (HCC): ICD-10-CM

## 2025-06-10 DIAGNOSIS — J96.01 ACUTE RESPIRATORY FAILURE WITH HYPOXIA (HCC): ICD-10-CM

## 2025-06-10 DIAGNOSIS — I10 PRIMARY HYPERTENSION: ICD-10-CM

## 2025-06-10 DIAGNOSIS — J81.0 FLASH PULMONARY EDEMA (HCC): ICD-10-CM

## 2025-06-10 DIAGNOSIS — K21.9 GERD (GASTROESOPHAGEAL REFLUX DISEASE): ICD-10-CM

## 2025-06-10 DIAGNOSIS — R11.2 NAUSEA AND VOMITING: ICD-10-CM

## 2025-06-10 DIAGNOSIS — R06.00 DYSPNEA: ICD-10-CM

## 2025-06-10 DIAGNOSIS — I48.91 ATRIAL FIB/FLUTTER, TRANSIENT (HCC): ICD-10-CM

## 2025-06-10 DIAGNOSIS — R50.9 FEVER: ICD-10-CM

## 2025-06-10 DIAGNOSIS — R09.02 HYPOXIC: ICD-10-CM

## 2025-06-10 PROBLEM — A41.9 SEPSIS (HCC): Status: ACTIVE | Noted: 2025-06-10

## 2025-06-10 PROBLEM — E87.1 HYPONATREMIA: Status: ACTIVE | Noted: 2025-06-10

## 2025-06-10 PROBLEM — N40.0 BPH (BENIGN PROSTATIC HYPERPLASIA): Status: ACTIVE | Noted: 2025-06-10

## 2025-06-10 PROBLEM — E27.9 ADRENAL NODULE (HCC): Status: ACTIVE | Noted: 2025-06-10

## 2025-06-10 PROBLEM — R91.1 PULMONARY NODULE: Status: ACTIVE | Noted: 2025-06-10

## 2025-06-10 LAB
ALBUMIN SERPL BCG-MCNC: 4 G/DL (ref 3.5–5)
ALBUMIN SERPL BCG-MCNC: 4.1 G/DL (ref 3.5–5)
ALP SERPL-CCNC: 54 U/L (ref 34–104)
ALP SERPL-CCNC: 55 U/L (ref 34–104)
ALT SERPL W P-5'-P-CCNC: 10 U/L (ref 7–52)
ALT SERPL W P-5'-P-CCNC: 11 U/L (ref 7–52)
ANION GAP SERPL CALCULATED.3IONS-SCNC: 7 MMOL/L (ref 4–13)
ANION GAP SERPL CALCULATED.3IONS-SCNC: 9 MMOL/L (ref 4–13)
APTT PPP: 49 SECONDS (ref 23–34)
AST SERPL W P-5'-P-CCNC: 14 U/L (ref 13–39)
AST SERPL W P-5'-P-CCNC: 14 U/L (ref 13–39)
ATRIAL RATE: 94 BPM
BACTERIA UR QL AUTO: NORMAL /HPF
BASE EXCESS BLDA CALC-SCNC: -5 MMOL/L (ref -2–3)
BASOPHILS # BLD AUTO: 0.03 THOUSANDS/ÂΜL (ref 0–0.1)
BASOPHILS NFR BLD AUTO: 0 % (ref 0–1)
BILIRUB SERPL-MCNC: 1.29 MG/DL (ref 0.2–1)
BILIRUB SERPL-MCNC: 1.59 MG/DL (ref 0.2–1)
BILIRUB UR QL STRIP: NEGATIVE
BNP SERPL-MCNC: 409 PG/ML (ref 0–100)
BUN SERPL-MCNC: 14 MG/DL (ref 5–25)
BUN SERPL-MCNC: 15 MG/DL (ref 5–25)
CA-I BLD-SCNC: 1.01 MMOL/L (ref 1.12–1.32)
CA-I BLD-SCNC: 1.16 MMOL/L (ref 1.12–1.32)
CALCIUM SERPL-MCNC: 8.3 MG/DL (ref 8.4–10.2)
CALCIUM SERPL-MCNC: 8.7 MG/DL (ref 8.4–10.2)
CARDIAC TROPONIN I PNL SERPL HS: 16 NG/L (ref ?–50)
CHLORIDE SERPL-SCNC: 103 MMOL/L (ref 96–108)
CHLORIDE SERPL-SCNC: 98 MMOL/L (ref 96–108)
CLARITY UR: CLEAR
CO2 SERPL-SCNC: 25 MMOL/L (ref 21–32)
CO2 SERPL-SCNC: 26 MMOL/L (ref 21–32)
COLOR UR: YELLOW
CREAT SERPL-MCNC: 1.05 MG/DL (ref 0.6–1.3)
CREAT SERPL-MCNC: 1.05 MG/DL (ref 0.6–1.3)
EOSINOPHIL # BLD AUTO: 0.06 THOUSAND/ÂΜL (ref 0–0.61)
EOSINOPHIL NFR BLD AUTO: 1 % (ref 0–6)
ERYTHROCYTE [DISTWIDTH] IN BLOOD BY AUTOMATED COUNT: 13.2 % (ref 11.6–15.1)
FIO2 GAS DIL.REBREATH: 100 L
FLUAV RNA RESP QL NAA+PROBE: NEGATIVE
FLUBV RNA RESP QL NAA+PROBE: NEGATIVE
GFR SERPL CREATININE-BSD FRML MDRD: 68 ML/MIN/1.73SQ M
GFR SERPL CREATININE-BSD FRML MDRD: 68 ML/MIN/1.73SQ M
GLUCOSE SERPL-MCNC: 125 MG/DL (ref 65–140)
GLUCOSE SERPL-MCNC: 125 MG/DL (ref 65–140)
GLUCOSE SERPL-MCNC: 129 MG/DL (ref 65–140)
GLUCOSE UR STRIP-MCNC: NEGATIVE MG/DL
HCO3 BLDA-SCNC: 20.9 MMOL/L (ref 22–28)
HCT VFR BLD AUTO: 38.5 % (ref 36.5–49.3)
HCT VFR BLD CALC: 36 % (ref 36.5–49.3)
HGB BLD-MCNC: 12.3 G/DL (ref 12–17)
HGB BLDA-MCNC: 12.2 G/DL (ref 12–17)
HGB UR QL STRIP.AUTO: NEGATIVE
IMM GRANULOCYTES # BLD AUTO: 0.09 THOUSAND/UL (ref 0–0.2)
IMM GRANULOCYTES NFR BLD AUTO: 1 % (ref 0–2)
INR PPP: 2.41 (ref 0.85–1.19)
KETONES UR STRIP-MCNC: ABNORMAL MG/DL
L PNEUMO1 AG UR QL IA.RAPID: NEGATIVE
LACTATE SERPL-SCNC: 1 MMOL/L (ref 0.5–2)
LEUKOCYTE ESTERASE UR QL STRIP: NEGATIVE
LYMPHOCYTES # BLD AUTO: 0.89 THOUSANDS/ÂΜL (ref 0.6–4.47)
LYMPHOCYTES NFR BLD AUTO: 7 % (ref 14–44)
MAGNESIUM SERPL-MCNC: 1.9 MG/DL (ref 1.9–2.7)
MCH RBC QN AUTO: 30.1 PG (ref 26.8–34.3)
MCHC RBC AUTO-ENTMCNC: 31.9 G/DL (ref 31.4–37.4)
MCV RBC AUTO: 94 FL (ref 82–98)
MONOCYTES # BLD AUTO: 0.63 THOUSAND/ÂΜL (ref 0.17–1.22)
MONOCYTES NFR BLD AUTO: 5 % (ref 4–12)
NEUTROPHILS # BLD AUTO: 10.89 THOUSANDS/ÂΜL (ref 1.85–7.62)
NEUTS SEG NFR BLD AUTO: 86 % (ref 43–75)
NITRITE UR QL STRIP: NEGATIVE
NON-SQ EPI CELLS URNS QL MICRO: NORMAL /HPF
NRBC BLD AUTO-RTO: 0 /100 WBCS
PCO2 BLD: 22 MMOL/L (ref 21–32)
PCO2 BLD: 42.3 MM HG (ref 36–44)
PH BLD: 7.3 [PH] (ref 7.35–7.45)
PH UR STRIP.AUTO: 6 [PH]
PHOSPHATE SERPL-MCNC: 3.1 MG/DL (ref 2.3–4.1)
PLATELET # BLD AUTO: 223 THOUSANDS/UL (ref 149–390)
PMV BLD AUTO: 9.8 FL (ref 8.9–12.7)
PO2 BLD: 107 MM HG (ref 75–129)
POTASSIUM BLD-SCNC: 4.3 MMOL/L (ref 3.5–5.3)
POTASSIUM SERPL-SCNC: 4 MMOL/L (ref 3.5–5.3)
POTASSIUM SERPL-SCNC: 4.2 MMOL/L (ref 3.5–5.3)
PROCALCITONIN SERPL-MCNC: 0.62 NG/ML
PROT SERPL-MCNC: 6.8 G/DL (ref 6.4–8.4)
PROT SERPL-MCNC: 6.8 G/DL (ref 6.4–8.4)
PROT UR STRIP-MCNC: ABNORMAL MG/DL
PROTHROMBIN TIME: 26.5 SECONDS (ref 12.3–15)
QRS AXIS: 94 DEGREES
QRSD INTERVAL: 96 MS
QT INTERVAL: 348 MS
QTC INTERVAL: 432 MS
RBC # BLD AUTO: 4.09 MILLION/UL (ref 3.88–5.62)
RBC #/AREA URNS AUTO: NORMAL /HPF
RSV RNA RESP QL NAA+PROBE: NEGATIVE
S PNEUM AG UR QL: NEGATIVE
SAO2 % BLD FROM PO2: 98 % (ref 60–85)
SARS-COV-2 RNA RESP QL NAA+PROBE: NEGATIVE
SODIUM BLD-SCNC: 136 MMOL/L (ref 136–145)
SODIUM SERPL-SCNC: 131 MMOL/L (ref 135–147)
SODIUM SERPL-SCNC: 137 MMOL/L (ref 135–147)
SP GR UR STRIP.AUTO: 1.01
SPECIMEN SOURCE: ABNORMAL
T WAVE AXIS: 23 DEGREES
UROBILINOGEN UR QL STRIP.AUTO: 0.2 E.U./DL
VENTRICULAR RATE: 93 BPM
WBC # BLD AUTO: 12.59 THOUSAND/UL (ref 4.31–10.16)
WBC #/AREA URNS AUTO: NORMAL /HPF

## 2025-06-10 PROCEDURE — 93010 ELECTROCARDIOGRAM REPORT: CPT | Performed by: INTERNAL MEDICINE

## 2025-06-10 PROCEDURE — 71045 X-RAY EXAM CHEST 1 VIEW: CPT

## 2025-06-10 PROCEDURE — 84295 ASSAY OF SERUM SODIUM: CPT

## 2025-06-10 PROCEDURE — 93005 ELECTROCARDIOGRAM TRACING: CPT

## 2025-06-10 PROCEDURE — 80053 COMPREHEN METABOLIC PANEL: CPT

## 2025-06-10 PROCEDURE — 84484 ASSAY OF TROPONIN QUANT: CPT | Performed by: NURSE PRACTITIONER

## 2025-06-10 PROCEDURE — 99292 CRITICAL CARE ADDL 30 MIN: CPT

## 2025-06-10 PROCEDURE — 85730 THROMBOPLASTIN TIME PARTIAL: CPT

## 2025-06-10 PROCEDURE — 74177 CT ABD & PELVIS W/CONTRAST: CPT

## 2025-06-10 PROCEDURE — 99285 EMERGENCY DEPT VISIT HI MDM: CPT

## 2025-06-10 PROCEDURE — 83880 ASSAY OF NATRIURETIC PEPTIDE: CPT

## 2025-06-10 PROCEDURE — 80053 COMPREHEN METABOLIC PANEL: CPT | Performed by: NURSE PRACTITIONER

## 2025-06-10 PROCEDURE — 82947 ASSAY GLUCOSE BLOOD QUANT: CPT

## 2025-06-10 PROCEDURE — 36415 COLL VENOUS BLD VENIPUNCTURE: CPT

## 2025-06-10 PROCEDURE — 85025 COMPLETE CBC W/AUTO DIFF WBC: CPT

## 2025-06-10 PROCEDURE — 81001 URINALYSIS AUTO W/SCOPE: CPT

## 2025-06-10 PROCEDURE — 84145 PROCALCITONIN (PCT): CPT

## 2025-06-10 PROCEDURE — 83605 ASSAY OF LACTIC ACID: CPT

## 2025-06-10 PROCEDURE — 0241U HB NFCT DS VIR RESP RNA 4 TRGT: CPT | Performed by: FAMILY MEDICINE

## 2025-06-10 PROCEDURE — 71260 CT THORAX DX C+: CPT

## 2025-06-10 PROCEDURE — 99223 1ST HOSP IP/OBS HIGH 75: CPT | Performed by: NURSE PRACTITIONER

## 2025-06-10 PROCEDURE — 99223 1ST HOSP IP/OBS HIGH 75: CPT | Performed by: INTERNAL MEDICINE

## 2025-06-10 PROCEDURE — 99291 CRITICAL CARE FIRST HOUR: CPT | Performed by: FAMILY MEDICINE

## 2025-06-10 PROCEDURE — 87449 NOS EACH ORGANISM AG IA: CPT

## 2025-06-10 PROCEDURE — 84132 ASSAY OF SERUM POTASSIUM: CPT

## 2025-06-10 PROCEDURE — 94640 AIRWAY INHALATION TREATMENT: CPT

## 2025-06-10 PROCEDURE — 94760 N-INVAS EAR/PLS OXIMETRY 1: CPT

## 2025-06-10 PROCEDURE — 84100 ASSAY OF PHOSPHORUS: CPT | Performed by: NURSE PRACTITIONER

## 2025-06-10 PROCEDURE — 82330 ASSAY OF CALCIUM: CPT | Performed by: NURSE PRACTITIONER

## 2025-06-10 PROCEDURE — 83735 ASSAY OF MAGNESIUM: CPT | Performed by: NURSE PRACTITIONER

## 2025-06-10 PROCEDURE — 82330 ASSAY OF CALCIUM: CPT

## 2025-06-10 PROCEDURE — 85610 PROTHROMBIN TIME: CPT

## 2025-06-10 PROCEDURE — NC001 PR NO CHARGE: Performed by: NURSE PRACTITIONER

## 2025-06-10 PROCEDURE — 87040 BLOOD CULTURE FOR BACTERIA: CPT

## 2025-06-10 PROCEDURE — 96361 HYDRATE IV INFUSION ADD-ON: CPT

## 2025-06-10 PROCEDURE — 85014 HEMATOCRIT: CPT

## 2025-06-10 PROCEDURE — 96365 THER/PROPH/DIAG IV INF INIT: CPT

## 2025-06-10 PROCEDURE — 94668 MNPJ CHEST WALL SBSQ: CPT

## 2025-06-10 PROCEDURE — 82803 BLOOD GASES ANY COMBINATION: CPT

## 2025-06-10 RX ORDER — SODIUM CHLORIDE 9 MG/ML
75 INJECTION, SOLUTION INTRAVENOUS CONTINUOUS
Status: DISCONTINUED | OUTPATIENT
Start: 2025-06-10 | End: 2025-06-10

## 2025-06-10 RX ORDER — GUAIFENESIN 600 MG/1
600 TABLET, EXTENDED RELEASE ORAL 2 TIMES DAILY
Status: DISCONTINUED | OUTPATIENT
Start: 2025-06-10 | End: 2025-06-11

## 2025-06-10 RX ORDER — LISINOPRIL 10 MG/1
10 TABLET ORAL DAILY
Status: CANCELLED | OUTPATIENT
Start: 2025-06-11

## 2025-06-10 RX ORDER — AMLODIPINE BESYLATE 2.5 MG/1
2.5 TABLET ORAL DAILY
Status: DISCONTINUED | OUTPATIENT
Start: 2025-06-11 | End: 2025-06-11

## 2025-06-10 RX ORDER — ACETAMINOPHEN 10 MG/ML
1000 INJECTION, SOLUTION INTRAVENOUS ONCE
Status: COMPLETED | OUTPATIENT
Start: 2025-06-10 | End: 2025-06-10

## 2025-06-10 RX ORDER — TAMSULOSIN HYDROCHLORIDE 0.4 MG/1
0.4 CAPSULE ORAL
Status: DISCONTINUED | OUTPATIENT
Start: 2025-06-10 | End: 2025-06-17 | Stop reason: HOSPADM

## 2025-06-10 RX ORDER — LEVALBUTEROL INHALATION SOLUTION 1.25 MG/3ML
1.25 SOLUTION RESPIRATORY (INHALATION)
Status: DISCONTINUED | OUTPATIENT
Start: 2025-06-10 | End: 2025-06-11

## 2025-06-10 RX ORDER — POLYETHYLENE GLYCOL 3350 17 G/17G
17 POWDER, FOR SOLUTION ORAL DAILY
Status: DISCONTINUED | OUTPATIENT
Start: 2025-06-10 | End: 2025-06-11

## 2025-06-10 RX ORDER — AZITHROMYCIN 250 MG/1
500 TABLET, FILM COATED ORAL EVERY 24 HOURS
Status: COMPLETED | OUTPATIENT
Start: 2025-06-11 | End: 2025-06-14

## 2025-06-10 RX ORDER — CHLORHEXIDINE GLUCONATE ORAL RINSE 1.2 MG/ML
15 SOLUTION DENTAL EVERY 12 HOURS SCHEDULED
Status: DISCONTINUED | OUTPATIENT
Start: 2025-06-10 | End: 2025-06-11

## 2025-06-10 RX ORDER — BENAZEPRIL HYDROCHLORIDE 10 MG/1
10 TABLET ORAL DAILY
Status: DISCONTINUED | OUTPATIENT
Start: 2025-06-10 | End: 2025-06-10

## 2025-06-10 RX ORDER — FLECAINIDE ACETATE 50 MG/1
50 TABLET ORAL 2 TIMES DAILY
Status: DISCONTINUED | OUTPATIENT
Start: 2025-06-10 | End: 2025-06-17 | Stop reason: HOSPADM

## 2025-06-10 RX ORDER — FUROSEMIDE 10 MG/ML
80 INJECTION INTRAMUSCULAR; INTRAVENOUS ONCE
Status: COMPLETED | OUTPATIENT
Start: 2025-06-10 | End: 2025-06-10

## 2025-06-10 RX ORDER — PRAVASTATIN SODIUM 20 MG
20 TABLET ORAL
Status: DISCONTINUED | OUTPATIENT
Start: 2025-06-10 | End: 2025-06-10

## 2025-06-10 RX ORDER — LATANOPROST 50 UG/ML
1 SOLUTION/ DROPS OPHTHALMIC
Status: DISCONTINUED | OUTPATIENT
Start: 2025-06-10 | End: 2025-06-17 | Stop reason: HOSPADM

## 2025-06-10 RX ORDER — SODIUM CHLORIDE FOR INHALATION 3 %
4 VIAL, NEBULIZER (ML) INHALATION
Status: DISCONTINUED | OUTPATIENT
Start: 2025-06-10 | End: 2025-06-11

## 2025-06-10 RX ORDER — CALCIUM GLUCONATE 20 MG/ML
2 INJECTION, SOLUTION INTRAVENOUS ONCE
Status: COMPLETED | OUTPATIENT
Start: 2025-06-10 | End: 2025-06-10

## 2025-06-10 RX ORDER — TAMSULOSIN HYDROCHLORIDE 0.4 MG/1
0.4 CAPSULE ORAL
COMMUNITY
Start: 2025-05-26

## 2025-06-10 RX ORDER — LISINOPRIL 10 MG/1
10 TABLET ORAL DAILY
Status: DISCONTINUED | OUTPATIENT
Start: 2025-06-11 | End: 2025-06-17 | Stop reason: HOSPADM

## 2025-06-10 RX ORDER — ONDANSETRON 2 MG/ML
4 INJECTION INTRAMUSCULAR; INTRAVENOUS EVERY 6 HOURS PRN
Status: DISCONTINUED | OUTPATIENT
Start: 2025-06-10 | End: 2025-06-17 | Stop reason: HOSPADM

## 2025-06-10 RX ORDER — METOPROLOL SUCCINATE 25 MG/1
25 TABLET, EXTENDED RELEASE ORAL DAILY
Status: DISCONTINUED | OUTPATIENT
Start: 2025-06-10 | End: 2025-06-17 | Stop reason: HOSPADM

## 2025-06-10 RX ORDER — PRAVASTATIN SODIUM 20 MG
20 TABLET ORAL DAILY
Status: DISCONTINUED | OUTPATIENT
Start: 2025-06-11 | End: 2025-06-17 | Stop reason: HOSPADM

## 2025-06-10 RX ORDER — ACETAMINOPHEN 325 MG/1
650 TABLET ORAL EVERY 6 HOURS PRN
Status: DISCONTINUED | OUTPATIENT
Start: 2025-06-10 | End: 2025-06-17 | Stop reason: HOSPADM

## 2025-06-10 RX ORDER — LABETALOL HYDROCHLORIDE 5 MG/ML
10 INJECTION, SOLUTION INTRAVENOUS EVERY 4 HOURS PRN
Status: DISCONTINUED | OUTPATIENT
Start: 2025-06-10 | End: 2025-06-17 | Stop reason: HOSPADM

## 2025-06-10 RX ORDER — ALBUTEROL SULFATE 0.83 MG/ML
2.5 SOLUTION RESPIRATORY (INHALATION) EVERY 4 HOURS PRN
Status: DISCONTINUED | OUTPATIENT
Start: 2025-06-10 | End: 2025-06-17 | Stop reason: HOSPADM

## 2025-06-10 RX ORDER — DOCUSATE SODIUM 100 MG/1
100 CAPSULE, LIQUID FILLED ORAL 2 TIMES DAILY
Status: DISCONTINUED | OUTPATIENT
Start: 2025-06-10 | End: 2025-06-11

## 2025-06-10 RX ADMIN — APIXABAN 5 MG: 5 TABLET, FILM COATED ORAL at 17:22

## 2025-06-10 RX ADMIN — GUAIFENESIN 600 MG: 600 TABLET ORAL at 17:22

## 2025-06-10 RX ADMIN — ACETAMINOPHEN 1000 MG: 10 INJECTION INTRAVENOUS at 08:54

## 2025-06-10 RX ADMIN — SODIUM CHLORIDE 1000 ML: 0.9 INJECTION, SOLUTION INTRAVENOUS at 09:58

## 2025-06-10 RX ADMIN — LATANOPROST 1 DROP: 50 SOLUTION OPHTHALMIC at 21:41

## 2025-06-10 RX ADMIN — FLECAINIDE ACETATE 50 MG: 50 TABLET ORAL at 17:22

## 2025-06-10 RX ADMIN — AZITHROMYCIN MONOHYDRATE 500 MG: 500 INJECTION, POWDER, LYOPHILIZED, FOR SOLUTION INTRAVENOUS at 15:15

## 2025-06-10 RX ADMIN — TAMSULOSIN HYDROCHLORIDE 0.4 MG: 0.4 CAPSULE ORAL at 17:44

## 2025-06-10 RX ADMIN — FUROSEMIDE 80 MG: 10 INJECTION, SOLUTION INTRAMUSCULAR; INTRAVENOUS at 16:30

## 2025-06-10 RX ADMIN — SODIUM CHLORIDE 1000 ML: 0.9 INJECTION, SOLUTION INTRAVENOUS at 10:01

## 2025-06-10 RX ADMIN — LEVALBUTEROL HYDROCHLORIDE 1.25 MG: 1.25 SOLUTION RESPIRATORY (INHALATION) at 18:07

## 2025-06-10 RX ADMIN — CHLORHEXIDINE GLUCONATE 15 ML: 1.2 SOLUTION ORAL at 21:56

## 2025-06-10 RX ADMIN — SODIUM CHLORIDE 1000 ML: 0.9 INJECTION, SOLUTION INTRAVENOUS at 08:48

## 2025-06-10 RX ADMIN — SODIUM CHLORIDE SOLN NEBU 3% 4 ML: 3 NEBU SOLN at 18:07

## 2025-06-10 RX ADMIN — CALCIUM GLUCONATE 2 G: 20 INJECTION, SOLUTION INTRAVENOUS at 18:40

## 2025-06-10 RX ADMIN — IOHEXOL 100 ML: 350 INJECTION, SOLUTION INTRAVENOUS at 09:19

## 2025-06-10 RX ADMIN — CEFEPIME 2000 MG: 2 INJECTION, POWDER, FOR SOLUTION INTRAVENOUS at 08:53

## 2025-06-10 NOTE — ED PROVIDER NOTES
Time reflects when diagnosis was documented in both MDM as applicable and the Disposition within this note       Time User Action Codes Description Comment    6/10/2025 10:10 AM Perdomo, Matt Add [R19.7] Diarrhea     6/10/2025 10:10 AM Perdomo, Matt Add [R11.2] Nausea and vomiting     6/10/2025 11:04 AM Perdomo, Matt Add [J18.9] Pneumonia     6/10/2025 11:04 AM Perdomo, Matt Modify [R19.7] Diarrhea     6/10/2025 11:04 AM Perdomo, Matt Modify [J18.9] Pneumonia     6/10/2025 11:12 AM Perdomo, Matt Add [R50.9] Fever     6/10/2025 11:12 AM Perdomo, Matt Add [R09.02] Hypoxic     6/10/2025 11:12 AM Perdomo, Matt Add [R06.00] Dyspnea     6/10/2025 11:12 AM Perdomo, Matt Add [I48.91,  I48.92] Atrial fib/flutter, transient (HCC)           ED Disposition       ED Disposition   Admit    Condition   Stable    Date/Time   Tue Nain 10, 2025 10:59 AM    Comment                  Assessment & Plan       Medical Decision Making  Patient is a 74-year-old male with past medical history of hypertension, atrial fibrillation, presenting with a 2-day history of nausea, vomiting, fevers, chills, weakness.  On evaluation patient is sick appearing, vital stable at this time, rhonchorous lung auscultation, abdomen was soft nontender nondistended, lower extremities are without swelling.  Differential at this time includes but is not limited to viral syndrome, acute upper respiratory infection, acute gastritis, acute pancreatitis, acute gastroenteritis, pneumonia.  Evaluation at this time includes septic workup including CBC, CMP, viral swab, lactic, Pro-Roland, lipase.  Imaging studies include portable chest x-ray, CT chest abdomen pelvis with contrast.  Patient is currently being monitored on telemetry given his history of A-fib.  Interventions at this time include 30 cc/kg bolus of normal saline, antibiotic therapy with cefepime.     See ED course for additional information    Amount and/or Complexity of Data  Reviewed  Labs: ordered. Decision-making details documented in ED Course.  Radiology: ordered and independent interpretation performed. Decision-making details documented in ED Course.    Risk  Prescription drug management.  Decision regarding hospitalization.        ED Course as of 06/10/25 1245   Tue Nain 10, 2025   0851 EKG: Appears to be regular appearance with intermittent episodes of atrial fibrillation, rate of approximately 100 bpm, right axis deviation, no ischemic changes appreciated on my interpretation   0906 XR chest 1 view portable  Appreciable right lower lobe opacity and elevated left hemidiaphragm concerning for pneumonia in the setting of his clinical presentation   0916 Procalcitonin(!): 0.62   0921 WBC(!): 12.59   0921 Respirations(!): 24   0941 FLU/RSV/COVID - if FLU/RSV clinically relevant (2hr TAT)  negative   0942 CT chest abdomen pelvis w contrast  CT scan concerning for loculation opacity appreciated in the right lower lobe, as well as potential parapneumonic effusion on the left side on my interpretation   1016 Patient reassessed and informed regarding the current lab and imaging results.  Patient understanding and amenable to plan at this time.   1105 Critical Care Time Statement: Upon my evaluation, this patient had a high probability of imminent or life-threatening deterioration due to significant right sided pneumonia and identified pleural effusion, with an associated increased oxygen requirement requiring approximately 4 L nasal cannula satting at 91%, which required my direct attention, intervention, and personal management.  I spent a total of more than 35 minutes minutes directly providing critical care services, including interpretation of complex medical databases, evaluating for the presence of life-threatening injuries or illnesses, management of organ system failure(s) , complex medical decision making (to support/prevent further life-threatening deterioration).,  interpretation of hemodynamic data, and titration of continuous IV medications (drips). This time is exclusive of procedures, teaching, treating other patients, family meetings, and any prior time recorded by providers other than myself.       1106 Disposition:  - Patient admitted to inpatient [facility].  Condition is stable at time of admission, patient understanding and agreeable with plan going forward for treatment of identified pneumonia.  - Reviewed diagnosis, treatment plan, and expectant course  - Verbal and written instructions given for outpatient follow up            Medications   acetaminophen (Ofirmev) injection 1,000 mg (0 mg Intravenous Stopped 6/10/25 0954)   sodium chloride 0.9 % bolus 1,000 mL (0 mL Intravenous Stopped 6/10/25 0956)     Followed by   sodium chloride 0.9 % bolus 1,000 mL (0 mL Intravenous Stopped 6/10/25 1000)     Followed by   sodium chloride 0.9 % bolus 1,000 mL (0 mL Intravenous Stopped 6/10/25 1150)   cefepime (MAXIPIME) 2 g/50 mL dextrose IVPB (0 mg Intravenous Stopped 6/10/25 0954)   iohexol (OMNIPAQUE) 350 MG/ML injection (MULTI-DOSE) 100 mL (100 mL Intravenous Given 6/10/25 0919)       ED Risk Strat Scores                    (ISAR) Identification of Seniors at Risk  Before the illness or injury that brought you to the Emergency, did you need someone to help you on a regular basis?: 0  In the last 24 hours, have you needed more help than usual?: 0  Have you been hospitalized for one or more nights during the past 6 months?: 0  In general, do you see well?: 0  In general, do you have serious problems with your memory?: 1  Do you take more than three different medications every day?: 1  ISAR Score: 2            SBIRT 20yo+      Flowsheet Row Most Recent Value   Initial Alcohol Screen: US AUDIT-C     1. How often do you have a drink containing alcohol? 0 Filed at: 06/10/2025 0816   2. How many drinks containing alcohol do you have on a typical day you are drinking?  0  Filed at: 06/10/2025 0816   3b. FEMALE Any Age, or MALE 65+: How often do you have 4 or more drinks on one occassion? 0 Filed at: 06/10/2025 0816   Audit-C Score 0 Filed at: 06/10/2025 0816   XIMENA: How many times in the past year have you...    Used an illegal drug or used a prescription medication for non-medical reasons? Never Filed at: 06/10/2025 0816                            History of Present Illness       Chief Complaint   Patient presents with    Diarrhea     Started with diarrhea Saturday, becoming more activity intolerant. Also reporting low grade fever, abdominal pain, shortness of breath and vomiting.       Past Medical History[1]   Past Surgical History[2]   Family History[3]   Social History[4]   E-Cigarette/Vaping      E-Cigarette/Vaping Substances      I have reviewed and agree with the history as documented.     Is a 74-year-old male with past medical history of hypertension and atrial fibrillation, presenting with a 3-day history of nausea, vomiting, fever, chills, shortness of breath.  Patient reports no inciting event.  Reports taking Tylenol with minimal benefit.  Reports initially tolerating p.o. intake however shortly after has an episode of diarrhea.  Denies any overt abdominal pain, chest pain, lower extremity swelling, lower extremity pain, weakness.      History provided by:  Patient  Diarrhea  Associated symptoms: vomiting    Associated symptoms: no abdominal pain, no chills, no fever, no headaches and no myalgias        Review of Systems   Constitutional:  Negative for chills, fatigue and fever.   Respiratory:  Positive for cough and shortness of breath. Negative for chest tightness.    Cardiovascular:  Negative for palpitations.   Gastrointestinal:  Positive for diarrhea, nausea and vomiting. Negative for abdominal pain.   Musculoskeletal:  Negative for myalgias and neck pain.   Neurological:  Negative for weakness, numbness and headaches.   All other systems reviewed and are  negative.          Objective       ED Triage Vitals [06/10/25 0814]   Temperature Pulse Blood Pressure Respirations SpO2 Patient Position - Orthostatic VS   100.2 °F (37.9 °C) 104 143/64 (!) 24 90 % Sitting      Temp Source Heart Rate Source BP Location FiO2 (%) Pain Score    Temporal Monitor Left arm -- 6      Vitals      Date and Time Temp Pulse SpO2 Resp BP Pain Score FACES Pain Rating User   06/10/25 1215 -- 81 93 % 20 122/61 -- -- EM   06/10/25 1200 -- 81 93 % -- 118/56 -- -- EM   06/10/25 1145 97.6 °F (36.4 °C) 83 94 % 22 121/60 -- -- EM   06/10/25 1130 -- 84 92 % 22 125/63 -- -- EM   06/10/25 1115 -- 85 92 % 22 140/68 -- --    06/10/25 1100 -- 86 93 % 20 137/65 -- --    06/10/25 1059 -- 87 91 % -- 148/69 -- -- DK   06/10/25 1044 -- 89 90 % -- 147/65 -- -- DK   06/10/25 1029 -- 82 91 % -- 138/66 -- -- DK   06/10/25 1015 -- 82 92 % -- 125/60 -- -- BM   06/10/25 1010 -- 83 93 % -- 125/60 -- -- DK   06/10/25 1004 -- 83 93 % -- 123/59 -- -- DK   06/10/25 0954 -- 95 90 % -- 137/64 -- -- DK   06/10/25 0901 -- 89 95 % -- 139/67 -- -- DK   06/10/25 0851 -- 94 94 % -- 145/74 -- -- DK   06/10/25 0845 -- 94 93 % -- 152/70 -- -- TG   06/10/25 0814 100.2 °F (37.9 °C) 104 90 % 24 143/64 6 -- TG            Physical Exam  Vitals and nursing note reviewed.   Constitutional:       General: He is not in acute distress.     Appearance: He is ill-appearing.   HENT:      Head: Normocephalic and atraumatic.      Mouth/Throat:      Mouth: Mucous membranes are moist.     Eyes:      Pupils: Pupils are equal, round, and reactive to light.       Cardiovascular:      Rate and Rhythm: Normal rate and regular rhythm.      Pulses: Normal pulses.      Heart sounds: Normal heart sounds. No murmur heard.  Pulmonary:      Effort: Pulmonary effort is normal. No respiratory distress.      Breath sounds: No stridor. Rhonchi present. No wheezing or rales.   Abdominal:      General: Abdomen is flat. There is no distension.      Palpations:  Abdomen is soft.      Tenderness: There is no abdominal tenderness. There is no guarding or rebound.     Musculoskeletal:         General: No swelling or tenderness.     Skin:     General: Skin is warm and dry.     Neurological:      General: No focal deficit present.      Mental Status: Mental status is at baseline.     Psychiatric:         Mood and Affect: Mood normal.         Behavior: Behavior normal.         Results Reviewed       Procedure Component Value Units Date/Time    Urine Microscopic [[id number]]  (Normal) Collected: 06/10/25 0957    Lab Status: Final result Specimen: Urine, Other Updated: 06/10/25 1112     RBC, UA None Seen /hpf      WBC, UA 0-1 /hpf      Epithelial Cells Occasional /hpf      Bacteria, UA Occasional /hpf     UA w Reflex to Microscopic w Reflex to Culture [[id number]]  (Abnormal) Collected: 06/10/25 0957    Lab Status: Final result Specimen: Urine, Other Updated: 06/10/25 1048     Color, UA Yellow     Clarity, UA Clear     Specific Gravity, UA 1.010     pH, UA 6.0     Leukocytes, UA Negative     Nitrite, UA Negative     Protein, UA Trace mg/dl      Glucose, UA Negative mg/dl      Ketones, UA 15 (1+) mg/dl      Urobilinogen, UA 0.2 E.U./dl      Bilirubin, UA Negative     Occult Blood, UA Negative    FLU/RSV/COVID - if FLU/RSV clinically relevant (2hr TAT) [[id number]]  (Normal) Collected: 06/10/25 0836    Lab Status: Final result Specimen: Nares from Nose Updated: 06/10/25 0927     SARS-CoV-2 Negative     INFLUENZA A PCR Negative     INFLUENZA B PCR Negative     RSV PCR Negative    Narrative:      This test has been performed using the CoV-2/Flu/RSV plus assay on the [employer] GeneXpert platform. This test has been validated by the  and verified by the performing laboratory.     This test is designed to amplify and detect the following: nucleocapsid (N), envelope (E), and RNA-dependent RNA polymerase (RdRP) genes of the SARS-CoV-2 genome; matrix (M), basic polymerase (PB2), and  acidic protein (PA) segments of the influenza A genome; matrix (M) and non-structural protein (NS) segments of the influenza B genome, and the nucleocapsid genes of RSV A and RSV B.     Positive results are indicative of the presence of Flu A, Flu B, RSV, and/or SARS-CoV-2 RNA. Positive results for SARS-CoV-2 or suspected novel influenza should be reported to state, local, or federal health departments according to local reporting requirements.      All results should be assessed in conjunction with clinical presentation and other laboratory markers for clinical management.     FOR PEDIATRIC PATIENTS - copy/paste COVID Guidelines URL to browser: [email or web address]       Procalcitonin [[id number]]  (Abnormal) Collected: 06/10/25 0836    Lab Status: Final result Specimen: Blood from Arm, Left Updated: 06/10/25 0915     Procalcitonin 0.62 ng/ml     APTT [[id number]]  (Abnormal) Collected: 06/10/25 0836    Lab Status: Final result Specimen: Blood from Arm, Left Updated: 06/10/25 0915     PTT 49 seconds     Protime-INR [[id number]]  (Abnormal) Collected: 06/10/25 0836    Lab Status: Final result Specimen: Blood from Arm, Left Updated: 06/10/25 0915     Protime 26.5 seconds      INR 2.41    Narrative:      INR Therapeutic Range    Indication                                             INR Range      Atrial Fibrillation                                               2.0-3.0  Hypercoagulable State                                    2.0.2.3  Left Ventricular Asist Device                            2.0-3.0  Mechanical Heart Valve                                  -    Aortic(with afib, MI, embolism, HF, LA enlargement,    and/or coagulopathy)                                     2.0-3.0 (2.5-3.5)     Mitral                                                             2.5-3.5  Prosthetic/Bioprosthetic Heart Valve               2.0-3.0  Venous thromboembolism (VTE: VT, PE         2.0-3.0    CBC and differential [[id number]]  (Abnormal) Collected: 06/10/25 0836    Lab Status: Final result Specimen: Blood from Arm, Left Updated: 06/10/25 0908     WBC 12.59 Thousand/uL      RBC 4.09 Million/uL      Hemoglobin 12.3 g/dL      Hematocrit 38.5 %      MCV 94 fL      MCH 30.1 pg      MCHC 31.9 g/dL      RDW 13.2 %      MPV 9.8 fL      Platelets 223 Thousands/uL      nRBC 0 /100 WBCs      Segmented % 86 %      Immature Grans % 1 %      Lymphocytes % 7 %      Monocytes % 5 %      Eosinophils Relative 1 %      Basophils Relative 0 %      Absolute Neutrophils 10.89 Thousands/µL      Absolute Immature Grans 0.09 Thousand/uL      Absolute Lymphocytes 0.89 Thousands/µL      Absolute Monocytes 0.63 Thousand/µL      Eosinophils Absolute 0.06 Thousand/µL      Basophils Absolute 0.03 Thousands/µL     Lactic acid, plasma (w/reflex if result > 2.0) [[id number]]  (Normal) Collected: 06/10/25 0836    Lab Status: Final result Specimen: Blood from Arm, Left Updated: 06/10/25 0905     LACTIC ACID 1.0 mmol/L     Narrative:      Result may be elevated if tourniquet was used during collection.    Comprehensive metabolic panel [[id number]]  (Abnormal) Collected: 06/10/25 0836    Lab Status: Final result Specimen: Blood from Arm, Left Updated: 06/10/25 0904     Sodium 131 mmol/L      Potassium 4.0 mmol/L      Chloride 98 mmol/L      CO2 26 mmol/L      ANION GAP 7 mmol/L      BUN 15 mg/dL      Creatinine 1.05 mg/dL      Glucose 125 mg/dL      Calcium 8.7 mg/dL      AST 14 U/L      ALT 10 U/L      Alkaline Phosphatase 55 U/L      Total Protein 6.8 g/dL      Albumin 4.1 g/dL      Total Bilirubin 1.59 mg/dL      eGFR 68 ml/min/1.73sq m     Narrative:      National Kidney Disease Foundation guidelines for Chronic Kidney Disease (CKD):     Stage 1 with normal or high GFR (GFR > 90 mL/min/1.73 square meters)    Stage 2 Mild CKD (GFR = 60-89 mL/min/1.73 square meters)    Stage 3A Moderate CKD (GFR = 45-59 mL/min/1.73 square meters)     Stage 3B Moderate CKD (GFR = 30-44 mL/min/1.73 square meters)    Stage 4 Severe CKD (GFR = 15-29 mL/min/1.73 square meters)    Stage 5 End Stage CKD (GFR <15 mL/min/1.73 square meters)  Note: GFR calculation is accurate only with a steady state creatinine    Blood culture #1 [[id number]] Collected: 06/10/25 0836    Lab Status: In process Specimen: Blood from Arm, Left Updated: 06/10/25 0843    Blood culture #2 [[id number]] Collected: 06/10/25 0836    Lab Status: In process Specimen: Blood from Hand, Left Updated: 06/10/25 0843            CT chest abdomen pelvis w contrast   Final Interpretation by Saran Kiran MD (06/10 1051)      1. Significant right lower lobe pneumonia and small right pleural effusion. Mediastinal and hilar lymph nodes are likely reactive. Follow-up noncontrast chest CT is recommended in 3 months.      2. Solid 5 mm left upper lobe pulmonary nodule. The nodule will be reassessed on the aforementioned CT.      3. 1.8 cm right adrenal nodule. Although its imaging features are indeterminate, it does not have suspicious imaging features (heterogeneity, necrosis, irregular margins), therefore this is likely benign, and can be followed by non-contrast abdomen CT or    MRI in 12 months. Please note that for adrenal nodule > 1 cm,  biochemical evaluation is suggested to rule out functioning adenoma, if not already performed. Considerations related to the patient's age and/or comorbidities may be used to alter these    recommendations.      The study was marked in EPIC for immediate notification.      Resident: ZEE MALAVE I, the attending radiologist, have reviewed the images and agree with the final report above.      Workstation performed: [id number]         XR chest 1 view portable   ED Interpretation by Brody Riggs MD (06/10 1058)   Right side Pneumonia           Procedures    ED Medication and Procedure Management   Prior to Admission Medications   Prescriptions Last Dose Informant  Patient Reported? Taking?   AMLODIPINE BENZOATE PO   Yes No   Sig: Take 2.5 mg by mouth daily   BENAZEPRIL HCL PO   Yes No   Sig: Take 10 mg by mouth daily   Multiple Vitamins-Minerals (CENTRUM SILVER PO)  Self Yes No   Sig: Take by mouth   acetaminophen (TYLENOL) 325 mg tablet  Self Yes No   Sig: Take 650 mg by mouth every 6 (six) hours as needed for mild pain   apixaban (ELIQUIS) 5 mg   No No   Sig: Take 1 tablet (5 mg total) by mouth 2 (two) times a day   flecainide (TAMBOCOR) 50 mg tablet   No No   Sig: Take 1 tablet (50 mg total) by mouth 2 (two) times a day   latanoprost (XALATAN) 0.005 % ophthalmic solution  Self Yes No   Si drop daily at bedtime   metoprolol succinate (TOPROL-XL) 25 mg 24 hr tablet   No No   Sig: Take 1 tablet (25 mg total) by mouth daily   pravastatin (PRAVACHOL) 20 mg tablet   Yes No   Sig: Take 20 mg by mouth daily      Facility-Administered Medications: None     Patient's Medications   Discharge Prescriptions    No medications on file     No discharge procedures on file.  ED SEPSIS DOCUMENTATION   Time reflects when diagnosis was documented in both MDM as applicable and the Disposition within this note       Time User Action Codes Description Comment    6/10/2025 10:10 AM PerdomoMatt doll Add [R19.7] Diarrhea     6/10/2025 10:10 AM Stanford Perdomoon Add [R11.2] Nausea and vomiting     6/10/2025 11:04 AM Stanford Perdomoon Add [J18.9] Pneumonia     6/10/2025 11:04 AM Matt Perdomo Modify [R19.7] Diarrhea     6/10/2025 11:04 AM PerdomoStanford dollon Modify [J18.9] Pneumonia     6/10/2025 11:12 AM PerdomoStanford dollon Add [R50.9] Fever     6/10/2025 11:12 AM PerdomoStanford dollon Add [R09.02] Hypoxic     6/10/2025 11:12 AM PerdomoStanford dollon Add [R06.00] Dyspnea     6/10/2025 11:12 AM PerdomoStanford dollon Add [I48.91,  I48.92] Atrial fib/flutter, transient (HCC)            Initial Sepsis Screening       Row Name 06/10/25 0919 06/10/25 0909             Is the patient's history suggestive of a new  or worsening infection? -- Yes (Proceed)  -BM       Suspected source of infection pneumonia  -BM pneumonia  -BM       Indicate SIRS criteria Tachypnea > 20 resp per min;Leukocytosis (WBC > 55486 IJL) OR Leukopenia (WBC <4000 IJL) OR Bandemia (WBC >10% bands)  -BM Tachycardia > 90 bpm;Leukocytosis (WBC > 78817 IJL) OR Leukopenia (WBC <4000 IJL) OR Bandemia (WBC >10% bands)  -BM       Are two or more of the above signs & symptoms of infection both present and new to the patient? Yes (Proceed)  -BM Yes (Proceed)  -BM       Assess for evidence of organ dysfunction: Are any of the below criteria present within 6 hours of suspected infection and SIRS criteria that are NOT considered to be chronic conditions? -- --                 User Key  (r) = Recorded By, (t) = Taken By, (c) = Cosigned By      Initials Name Provider Type    THAO Perdomo PA-C Physician Assistant                           [1]   Past Medical History:  Diagnosis Date    Arthritis     Atrial fibrillation (HCC)     Cataract     Glaucoma     Right eye    Hypertension    [2]   Past Surgical History:  Procedure Laterality Date    CAST APPLICATION Left 1/3/2023    Procedure: Application short arm splint;  Surgeon: Sim Portillo MD;  Location: [facility];  Service: Orthopedics    CHEST SURGERY      Unsure of surgery, pt had a trauma and had chest surgery anterior and L posterior    FEMUR FRACTURE SURGERY Left     HAND DEBRIDEMENT Left 1/3/2023    Procedure: Irrigation and debridement of the left hand open index and long finger metacarpal fractures.  (Skin, subcutaneous tissue, muscle, fascia, and bone).  Irrigation and debridement skin, subcutaneous tissue, fascia and tendon left hand ring finger proximal phalanx wound.;  Surgeon: Sim Portillo MD;  Location: [facility];  Service: Orthopedics    HAND FRACTURE REPAIR Left 1/3/2023    Procedure: Open reduction and internal fixation left hand index and long finger metacarpal fractures.  Open  reduction and internal fixation left hand long finger proximal phalanx fracture;  Surgeon: Sim Portillo MD;  Location: [facility];  Service: Orthopedics    HIP FRACTURE SURGERY Left     KNEE SURGERY Left    [3] No family history on file.  [4]   Social History  Tobacco Use    Smoking status: Never    Smokeless tobacco: Never   Substance Use Topics    Alcohol use: Yes     Comment: 12 drinks monthly    Drug use: No        Matt Perdomo PA-C  06/10/25 7315

## 2025-06-10 NOTE — SEPSIS NOTE
Sepsis Note   Ramon Keith 74 y.o. male MRN: [id number]  Unit/Bed#: ED 14 Encounter: [id number]       Initial Sepsis Screening       Row Name 06/10/25 0919 06/10/25 0909             Is the patient's history suggestive of a new or worsening infection? -- Yes (Proceed)  -BM       Suspected source of infection pneumonia  -BM pneumonia  -BM       Indicate SIRS criteria Tachypnea > 20 resp per min;Leukocytosis (WBC > 96621 IJL) OR Leukopenia (WBC <4000 IJL) OR Bandemia (WBC >10% bands)  -BM Tachycardia > 90 bpm;Leukocytosis (WBC > 37838 IJL) OR Leukopenia (WBC <4000 IJL) OR Bandemia (WBC >10% bands)  -BM       Are two or more of the above signs & symptoms of infection both present and new to the patient? Yes (Proceed)  -BM Yes (Proceed)  -BM       Assess for evidence of organ dysfunction: Are any of the below criteria present within 6 hours of suspected infection and SIRS criteria that are NOT considered to be chronic conditions? -- --                 User Key  (r) = Recorded By, (t) = Taken By, (c) = Cosigned By      Initials Name Provider Type     Matt Perdomo PA-C Physician Assistant                   Initial Sepsis Screening       Row Name 06/10/25 0919 06/10/25 0909                Initial Sepsis Assessment    Is the patient's history suggestive of a new or worsening infection? -- Yes (Proceed)  -BM       Suspected source of infection pneumonia  -BM pneumonia  -BM       Indicate SIRS criteria Tachypnea > 20 resp per min;Leukocytosis (WBC > 43555 IJL) OR Leukopenia (WBC <4000 IJL) OR Bandemia (WBC >10% bands)  -BM Tachycardia > 90 bpm;Leukocytosis (WBC > 36294 IJL) OR Leukopenia (WBC <4000 IJL) OR Bandemia (WBC >10% bands)  -BM       Are two or more of the above signs & symptoms of infection both present and new to the patient? Yes (Proceed)  -BM Yes (Proceed)  -BM                 User Key  (r) = Recorded By, (t) = Taken By, (c) = Cosigned By      Initials Name Provider Type    THAO Perdomo PA-C  Physician Assistant                         Body mass index is 29.43 kg/m².  Wt Readings from Last 1 Encounters:   06/10/25 95.7 kg (211 lb)     IBW (Ideal Body Weight): 75.3 kg    Ideal body weight: 75.3 kg (166 lb 0.1 oz)  Adjusted ideal body weight: 83.5 kg (184 lb 0.1 oz)

## 2025-06-10 NOTE — ASSESSMENT & PLAN NOTE
Noted to be hypertensive at time of rapid response  Has underlying HTN and received 3L crystalloid  Is s/p IV Lasix 80 mg x1  Will start PRN IV Labetalol for SBP > 160  Continue home amlodipine, Toprol XL, and ACE-I   Monitor BP closely - will attempt to decrease SBP < 160 to avoid further flash pulmonary edema

## 2025-06-10 NOTE — RAPID RESPONSE
Rapid Response Note  Ramon Keith 74 y.o. male MRN: [id number]  Unit/Bed#: ICU 03-01 Encounter: [id number]    Rapid Response Notification(s):   Response called date/time:  6/10/2025 4:15 PM  Response team arrival date/time:  6/10/2025 4:16 PM  Response end date/time:  6/10/2025 4:45 PM  Level of care:  [facility]  Rapid response location:  ED (was hold in ED)  Primary reason for rapid response call:  Acute change in RR and acute change in O2 sat    Rapid Response Intervention(s):   Airway:  None  Breathing:  Oxygen  Circulation:  Electrocardiogram  Fluids administered:  None  Medications administered:  Furosemide       Assessment:   Acute hypoxic respiratory failure in setting of flash pulmonary edema and RLL pneumonia  Hypertensive urgency    Plan:   Received IV Lasix 80 mg x1 - monitor for response  Avoiding BIPAP for now as patient endorses nausea/vomiting - currently on NRB w/plans to switch to [facility]  Plan to give PRN IV Labetalol for SBP > 160  Will upgrade to SD1 under [facility]      Rapid Response Outcome:   Transfer:  Transfer to stepdown 1  Primary service notified of transfer: Yes    Code Status: Level 1 (Full Code)      Family notified: No, patient declined Family notification     Background/Situation:   Ramon Keith is a 74 y.o. male w/PMHx of HTN, HLD, AF on Flecainide s/p cardioversion in 2018-AC w/Eliquis, BPH who was initially admitted earlier today for sepsis in setting of RLL pneumonia. Patient was started on broad-spectrum antibiotics and received 3L crystalloid. This afternoon, a rapid response was called as patient developed increased WOB, SOB w/hypoxia, and diffuse rales/expiratory wheezing in setting of flash pulmonary edema.     Review of Systems   Constitutional:  Positive for fatigue. Negative for chills and fever.   HENT:  Positive for congestion.    Eyes:  Negative for visual disturbance.   Respiratory:  Positive for cough, shortness of breath and wheezing.    Cardiovascular:  Positive for  "chest pain.        Chest discomfort   Gastrointestinal:  Positive for nausea and vomiting. Negative for abdominal distention and abdominal pain.   Genitourinary:  Negative for decreased urine volume.   Musculoskeletal:  Negative for back pain.   Neurological:  Negative for dizziness, light-headedness and headaches.   Psychiatric/Behavioral:  Negative for confusion.        Objective:   Vitals:    06/10/25 1649 06/10/25 1807 06/10/25 1821 06/10/25 1832   BP: 150/70      BP Location:       Pulse: 104      Resp: (!) 46      Temp:       TempSrc:       SpO2: 97% 100% 99% 97%   Weight: 94.6 kg (208 lb 8.9 oz)      Height: 5' 11\" (1.803 m)        Physical Exam  Vitals and nursing note reviewed.   Constitutional:       General: He is awake. He is in acute distress.      Appearance: He is ill-appearing.      Interventions: Face mask in place.      Comments: NRB   HENT:      Mouth/Throat:      Mouth: Mucous membranes are moist.     Eyes:      Comments: R eye implant   Neck:      Vascular: JVD present.     Cardiovascular:      Rate and Rhythm: Tachycardia present. Rhythm irregular.      Pulses: Normal pulses.      Heart sounds: Normal heart sounds.      Comments: AF in 100s  Pulmonary:      Effort: Tachypnea and accessory muscle usage present.      Breath sounds: Wheezing and rales present.      Comments: Diffuse expiratory wheezing/rales  Abdominal:      General: There is no distension.      Palpations: Abdomen is soft.      Tenderness: There is no abdominal tenderness.     Musculoskeletal:         General: Normal range of motion.      Right lower leg: Edema present.      Left lower leg: Edema present.     Skin:     General: Skin is warm and dry.      Capillary Refill: Capillary refill takes less than 2 seconds.     Neurological:      General: No focal deficit present.      Mental Status: He is alert and oriented to person, place, and time.     Psychiatric:         Behavior: Behavior is cooperative.         "

## 2025-06-10 NOTE — RESPIRATORY THERAPY NOTE
"RT Protocol Note  Ramon Keith 74 y.o. male MRN: [id number]  Unit/Bed#: ICU 03-01 Encounter: [id number]    Assessment    Principal Problem:    Sepsis (HCC)  Active Problems:    Atrial fibrillation (HCC)    Hypertension    Hyperlipidemia    Nausea and vomiting    Hyponatremia      Home Pulmonary Medications:  None    Home Devices/Therapy: Other (Comment) (None)    Past Medical History[1]  Social History[2]    Subjective         Objective    Physical Exam:   Assessment Type: Assess only  General Appearance: Alert, Awake  Respiratory Pattern: Dyspnea with exertion  Chest Assessment: Chest expansion symmetrical  Bilateral Breath Sounds: Diminished  Cough: None    Vitals:  Blood pressure (!) 173/75, pulse (!) 109, temperature 99 °F (37.2 °C), temperature source Temporal, resp. rate (!) 51, height 5' 11\" (1.803 m), weight 95.7 kg (211 lb), SpO2 94%.          Imaging and other studies: Results Review Statement: I reviewed radiology reports from this admission including: chest xray.          Plan    Respiratory Plan:  (Patient SOB on exertion/ States no pulmonary history)        Resp Comments: Patient states he takes no breathing medication at home . He also states he has no history of breathing problem . Will order patrient an albuterol neb PRN and continue to monitor the patient.        [1]   Past Medical History:  Diagnosis Date    Arthritis     Atrial fibrillation (HCC)     Cataract     Glaucoma     Right eye    Hypertension    [2]   Social History  Socioeconomic History    Marital status: Single   Tobacco Use    Smoking status: Never    Smokeless tobacco: Never   Substance and Sexual Activity    Alcohol use: Yes     Comment: 12 drinks monthly    Drug use: No     Social Drivers of Health     Food Insecurity: No Food Insecurity (6/10/2025)    Nursing - Inadequate Food Risk Classification     Ran Out of Food in the Last Year: Never true   Transportation Needs: No Transportation Needs (6/10/2025)    Nursing - " Transportation Risk Classification     Lack of Transportation: No   Intimate Partner Violence: Unknown (6/10/2025)    Nursing IPS     Physically Hurt by Someone: No     Hurt or Threatened by Someone: No   Housing Stability: Unknown (6/10/2025)    Nursing: Inadequate Housing Risk Classification     Unable to Pay for Housing in the Last Year: No     Has Housin

## 2025-06-10 NOTE — H&P
H&P - Hospitalist   Name: Ramon Keith 74 y.o. male I MRN: [id number]  Unit/Bed#: ICU 03-01 I Date of Admission: 6/10/2025   Date of Service: 6/10/2025 I Hospital Day: 0     Assessment & Plan  Sepsis (HCC)  As evidenced by tachypnea (24), tachycardia (104), temperature 100.1 and WBC 12.59 likely secondary to pneumonia  Lactic acid 1.0  Procalcitonin 0.62, will repeat in a.m.  CT chest with findings significant for right lower lobe pneumonia and small right pleural effusion.  EKG showed A-fib  Follow pneumonia pathway  ED gave 3 L bolus IVF  Received IV cefepime x 1 dose in ED  Will start IV ceftriaxone every 24 hours from tomorrow  Azithromycin IV x 1 dose followed by azithromycin 500 mg p.o. every 24 hours x 4 days  Obtain urine antigens  Sputum culture if able  Mucinex for cough  Incentive spirometry  IV Solu-Medrol 40 mg every 8 hours  Monitor fever and hemodynamics    Atrial fibrillation (HCC)  Continue flecainide and metoprolol  Continue Eliquis  Hypertension  Continue lisinopril  Hyperlipidemia  Continue statin  Nausea and vomiting    Hyponatremia  Admission sodium 131 likely secondary to poor oral intake and nausea/vomiting  ED give 3 L bolus IVF  BMP in a.m.      VTE Pharmacologic Prophylaxis:   Moderate Risk (Score 3-4) - Pharmacological DVT Prophylaxis Ordered: apixaban (Eliquis).  Code Status: Level 1 - Full Code   Discussion with family: Updated  (sister) at bedside.    Anticipated Length of Stay: Patient will be admitted on an inpatient basis with an anticipated length of stay of greater than 2 midnights secondary to pneumonia.    History of Present Illness   Chief Complaint: Nausea/vomiting/chest pain    Ramon Keith is a 74 y.o. male with a PMH of paroxysmal A-fib on Eliquis and flecainide, HLD, hypertension who presents with complaints of nausea, vomiting, fever and chills x 2 days.  Patient reported mL of fish on Sunday and subsequently had vomiting and chills.  He tried Tylenol  to alleviate symptoms with no relief.  He reported shortness of breath, chest pain and soft formed bowel movements.  No dark stool nor blood in emesis.  CT imaging with finding of significant right lower lobe pneumonia.  Patient will be admitted to medicine service for further management.    Review of Systems   Constitutional:  Positive for activity change, chills and fever.   Respiratory:  Positive for cough and shortness of breath.    Cardiovascular:  Positive for chest pain. Negative for leg swelling.   Gastrointestinal:  Positive for nausea and vomiting.   Neurological:  Positive for weakness.       Historical Information   Past Medical History[1]  Past Surgical History[2]  Social History[3]  E-Cigarette/Vaping     E-Cigarette/Vaping Substances       Social History:  Marital Status: Single   Occupation:   Patient Pre-hospital Living Situation:   Patient Pre-hospital Level of Mobility:   Patient Pre-hospital Diet Restrictions:     Meds/Allergies   I have reviewed home medications with patient personally.  Prior to Admission medications    Medication Sig Start Date End Date Taking? Authorizing Provider   acetaminophen (TYLENOL) 325 mg tablet Take 650 mg by mouth every 6 (six) hours as needed for mild pain    Historical Provider, MD   AMLODIPINE BENZOATE PO Take 2.5 mg by mouth daily    Historical Provider, MD   apixaban (ELIQUIS) 5 mg Take 1 tablet (5 mg total) by mouth 2 (two) times a day 6/8/18   Brian Castañeda MD   BENAZEPRIL HCL PO Take 10 mg by mouth daily    Historical Provider, MD   flecainide (TAMBOCOR) 50 mg tablet Take 1 tablet (50 mg total) by mouth 2 (two) times a day 6/15/18   Brian Castañeda MD   latanoprost (XALATAN) 0.005 % ophthalmic solution 1 drop daily at bedtime    Historical Provider, MD   metoprolol succinate (TOPROL-XL) 25 mg 24 hr tablet Take 1 tablet (25 mg total) by mouth daily 6/8/18   Brian Castañeda MD   Multiple Vitamins-Minerals (CENTRUM SILVER PO) Take by mouth    Historical  Provider, MD   pravastatin (PRAVACHOL) 20 mg tablet Take 20 mg by mouth daily    Historical Provider, MD     Allergies   Allergen Reactions    Demerol [Meperidine]      Unknown reaction        Objective :  Temp:  [97.6 °F (36.4 °C)-100.2 °F (37.9 °C)] 97.6 °F (36.4 °C)  HR:  [] 98  BP: (108-176)/(55-80) 163/77  Resp:  [20-32] 26  SpO2:  [84 %-98 %] 97 %  O2 Device: Non-rebreather mask  Nasal Cannula O2 Flow Rate (L/min):  [2 L/min-15 L/min] 15 L/min    Physical Exam  Constitutional:       Appearance: He is ill-appearing.   HENT:      Nose: No congestion.      Mouth/Throat:      Mouth: Mucous membranes are moist.     Cardiovascular:      Rate and Rhythm: Rhythm irregular.      Heart sounds: Murmur heard.   Pulmonary:      Effort: Tachypnea present.      Breath sounds: Examination of the right-upper field reveals wheezing. Examination of the right-lower field reveals wheezing. Decreased breath sounds and wheezing present.     Musculoskeletal:         General: Normal range of motion.     Skin:     General: Skin is warm and dry.     Neurological:      Mental Status: He is alert and oriented to person, place, and time.      Motor: Weakness present.     Psychiatric:         Behavior: Behavior normal.          Lines/Drains:            Lab Results: I have reviewed the following results:  Results from last 7 days   Lab Units 06/10/25  1623 06/10/25  0836   WBC Thousand/uL  --  12.59*   HEMOGLOBIN g/dL  --  12.3   I STAT HEMOGLOBIN g/dl 12.2  --    HEMATOCRIT %  --  38.5   HEMATOCRIT, ISTAT % 36*  --    PLATELETS Thousands/uL  --  223   SEGS PCT %  --  86*   LYMPHO PCT %  --  7*   MONO PCT %  --  5   EOS PCT %  --  1     Results from last 7 days   Lab Units 06/10/25  1623 06/10/25  0836   SODIUM mmol/L  --  131*   POTASSIUM mmol/L  --  4.0   CHLORIDE mmol/L  --  98   CO2 mmol/L  --  26   CO2, I-STAT mmol/L 22  --    BUN mg/dL  --  15   CREATININE mg/dL  --  1.05   ANION GAP mmol/L  --  7   CALCIUM mg/dL  --  8.7  "  ALBUMIN g/dL  --  4.1   TOTAL BILIRUBIN mg/dL  --  1.59*   ALK PHOS U/L  --  55   ALT U/L  --  10   AST U/L  --  14   GLUCOSE RANDOM mg/dL  --  125     Results from last 7 days   Lab Units 06/10/25  0836   INR  2.41*         No results found for: \"HGBA1C\"  Results from last 7 days   Lab Units 06/10/25  0836   LACTIC ACID mmol/L 1.0   PROCALCITONIN ng/ml 0.62*       Imaging Results Review: I reviewed radiology reports from this admission including: chest xray and CT chest.      Administrative Statements       ** Please Note: This note has been constructed using a voice recognition system. **         [1]   Past Medical History:  Diagnosis Date    Arthritis     Atrial fibrillation (HCC)     Cataract     Glaucoma     Right eye    Hypertension    [2]   Past Surgical History:  Procedure Laterality Date    CAST APPLICATION Left 1/3/2023    Procedure: Application short arm splint;  Surgeon: Sim Portillo MD;  Location: [facility];  Service: Orthopedics    CHEST SURGERY      Unsure of surgery, pt had a trauma and had chest surgery anterior and L posterior    FEMUR FRACTURE SURGERY Left     HAND DEBRIDEMENT Left 1/3/2023    Procedure: Irrigation and debridement of the left hand open index and long finger metacarpal fractures.  (Skin, subcutaneous tissue, muscle, fascia, and bone).  Irrigation and debridement skin, subcutaneous tissue, fascia and tendon left hand ring finger proximal phalanx wound.;  Surgeon: Sim Portillo MD;  Location: [facility];  Service: Orthopedics    HAND FRACTURE REPAIR Left 1/3/2023    Procedure: Open reduction and internal fixation left hand index and long finger metacarpal fractures.  Open reduction and internal fixation left hand long finger proximal phalanx fracture;  Surgeon: Sim Portillo MD;  Location: [facility];  Service: Orthopedics    HIP FRACTURE SURGERY Left     KNEE SURGERY Left    [3]   Social History  Tobacco Use    Smoking status: Never    Smokeless tobacco: Never "   Substance and Sexual Activity    Alcohol use: Yes     Comment: 12 drinks monthly    Drug use: No

## 2025-06-10 NOTE — ASSESSMENT & PLAN NOTE
Imaging revealed solid 5 mm left upper lobe pulmonary nodule  Will require follow-up w/repeat imaging as OP

## 2025-06-10 NOTE — APP STUDENT NOTE
Ramon Keith is a 74-year-old male with a PMH of a-fib, HTN, HLD, who presented to the ER with a chief complaint of nausea, vomiting, fever, and chills x 2 days. Patient denies any routine changes or exacerbating factors prior to the onset of these symptoms. He tried alleviating symptoms with Tylenol while at home, but found little relief. He reports associated shortness of breath, chest heaviness, cough, mild night sweats, and soft stools. He denies blood in his emesis, urinary changes, or palpitations, and states that he is able to tolerate food and fluids. He denies any history of swallowing difficulties or history of aspiration. He further denies any recent sick contacts, and states that he has never experienced these symptoms before. At the time of this interview, patient reported improved symptoms, endorsing shortness of breath, but denying nausea, dizziness, chest pain, fever, chills. While in the ER, a CT was completed and revealed significant right lower lobe pneumonia, and the patient was started on IV cefepime.

## 2025-06-10 NOTE — ASSESSMENT & PLAN NOTE
Is s/p successful cardioversion in past, however currently in AF (rate-controlled)  Continue home AC w/Eliquis  Continue home BB and Flecainide   Continue cardiac monitoring

## 2025-06-10 NOTE — ASSESSMENT & PLAN NOTE
Imaging revealed 1.8 cm right adrenal nodule  Will require follow-up as OP w/either non-contrast abdomen CT or MRI in 12 months

## 2025-06-10 NOTE — CONSULTS
ICU Acceptance Note - Critical Care/ICU   Name: Ramon Keith 74 y.o. male I MRN: [id number]  Unit/Bed#: ICU 03-01 I Date of Admission: 6/10/2025   Date of Service: 6/10/2025 I Hospital Day: 0     Reason for Evaluation / Principal Problem: flash pulmonary edema      Assessment & Plan  Acute respiratory failure with hypoxia (HCC)  Admitted earlier today under SLIM for sepsis 2/2 RLL pneumonia w/only 2L NC requirement - rapid response called this afternoon for increased WOB/hypoxia requiring escalation to MFNC  Suspect this was initially in setting of RLL pneumonia w/small R pleural effusion (likely parapneumonic), however suspect escalation in O2 related to flash pulmonary edema   Imaging revealed significant right lower lobe pneumonia and small right pleural effusion w/mediastinal and hilar lymph nodes (likely reactive)  COVID/FLU/RSV negative  Urine strep/legionella pending  Obtain sputum culture if able  Hold further volume resuscitation - is now s/p IV Lasix 80 mg x1  Will start scheduled Xopenex/HTS to assist w/secretion clearance  Continue Mucinex  Consider diagnostic/therapeutic R thoracentesis if needed - at this time, appears minimal   Would consider BIPAP, however will hold off for now as patient endorses nausea w/vomiting - will transition from NRB to MFNC and increase to HFNC if needed  Aggressive pulmonary hygiene to include flutter valve/IS, deep breathing exercises, etc.  Wean O2 for SpO2 > 88%    Sepsis due to pneumonia (HCC)  Present on arrival to ED AEB tachycardia, tachypnea, leukocytosis  In setting of RLL pneumonia   Imaging as noted above  COVID/FLU/RSV negative   Blood cultures pending  UA w/out evidence of infection  Urine strep/legionella pending  Obtain sputum culture if able  LA WNL  Procalcitonin 0.62  Is s/p 3L crystalloid - hold on further volume given overload  Continue IV Ceftriaxone and Azithromycin for CAP  Trend fever and WBC curve  Flash pulmonary edema (HCC)  Rapid response  called this afternoon for escalating O2 requirements after receiving volume resuscitation - noted to have increased WOB/hypoxia/diffuse rales w/expiratory wheezing  Previous TTE from 2018 revealed EF 65-70% w/mild concentric hypertrophy  Is s/p IV Lasix 80 mg x1  Holding on BIPAP for now given nausea/vomiting - plan to place on MFNC and escalate to HFNC if needed  Repeat TTE   Monitor I/O  Daily weights  Hypertensive urgency  Noted to be hypertensive at time of rapid response  Has underlying HTN and received 3L crystalloid  Is s/p IV Lasix 80 mg x1  Will start PRN IV Labetalol for SBP > 160  Continue home amlodipine, Toprol XL, and ACE-I   Monitor BP closely - will attempt to decrease SBP < 160 to avoid further flash pulmonary edema  Atrial fibrillation (HCC)  Is s/p successful cardioversion in past, however currently in AF (rate-controlled)  Continue home AC w/Eliquis  Continue home BB and Flecainide   Continue cardiac monitoring  Pulmonary nodule  Imaging revealed solid 5 mm left upper lobe pulmonary nodule  Will require follow-up w/repeat imaging as OP  Adrenal nodule (HCC)  Imaging revealed 1.8 cm right adrenal nodule  Will require follow-up as OP w/either non-contrast abdomen CT or MRI in 12 months  Hyperlipidemia  Continue home statin  BPH (benign prostatic hyperplasia)  Continue home Flomax    Disposition: Stepdown Level 1    History of Present Illness   Ramon Keith is a 74 y.o. male w/PMHx of HTN, HLD, AF on Flecainide s/p cardioversion in 2018-AC w/Eliquis, BPH who was initially admitted earlier today for sepsis in setting of RLL pneumonia. Patient was started on broad-spectrum antibiotics and received 3L crystalloid. This afternoon, a rapid response was called as patient developed increased WOB, SOB w/hypoxia, and diffuse rales/expiratory wheezing in setting of flash pulmonary edema. He was given IV Lasix 80 mg x1 during rapid response and was placed on NRB. Patient will be upgraded to SD1 under [facility]  for further work-up and management of respiratory failure.    History obtained from chart review and the patient.  Review of Systems: Review of Systems   Constitutional:  Positive for fatigue. Negative for chills and fever.   HENT:  Positive for congestion.    Eyes:  Negative for visual disturbance.   Respiratory:  Positive for cough, shortness of breath and wheezing.    Cardiovascular:  Positive for chest pain.        Chest discomfort   Gastrointestinal:  Positive for nausea and vomiting. Negative for abdominal distention and abdominal pain.   Genitourinary:  Negative for decreased urine volume.   Musculoskeletal:  Negative for back pain.   Neurological:  Negative for dizziness, light-headedness and headaches.   Psychiatric/Behavioral:  Negative for confusion.        Historical Information   Past Medical History:  No date: Arthritis  No date: Atrial fibrillation (HCC)  No date: Cataract  No date: Glaucoma      Comment:  Right eye  No date: Hypertension Past Surgical History:  1/3/2023: CAST APPLICATION; Left      Comment:  Procedure: Application short arm splint;  Surgeon:                Sim Portillo MD;  Location: [facility];  Service:                Orthopedics  No date: CHEST SURGERY      Comment:  Unsure of surgery, pt had a trauma and had chest surgery               anterior and L posterior  No date: FEMUR FRACTURE SURGERY; Left  1/3/2023: HAND DEBRIDEMENT; Left      Comment:  Procedure: Irrigation and debridement of the left hand                open index and long finger metacarpal fractures.  (Skin,                subcutaneous tissue, muscle, fascia, and bone).                 Irrigation and debridement skin, subcutaneous tissue,                fascia and tendon left hand ring finger proximal phalanx                wound.;  Surgeon: Sim Portillo MD;  Location: [facility];  Service: Orthopedics  1/3/2023: HAND FRACTURE REPAIR; Left      Comment:  Procedure: Open reduction and  internal fixation left                hand index and long finger metacarpal fractures.  Open                reduction and internal fixation left hand long finger                proximal phalanx fracture;  Surgeon: Sim Portillo MD;  Location: [facility];  Service: Orthopedics  No date: HIP FRACTURE SURGERY; Left  No date: KNEE SURGERY; Left   Current Outpatient Medications   Medication Instructions    acetaminophen (TYLENOL) 650 mg, Oral, Every 6 hours PRN    AMLODIPINE BENZOATE PO 2.5 mg, Oral, Daily    apixaban (ELIQUIS) 5 mg, Oral, 2 times daily    BENAZEPRIL HCL PO 10 mg, Oral, Daily    flecainide (TAMBOCOR) 50 mg, Oral, 2 times daily    latanoprost (XALATAN) 0.005 % ophthalmic solution 1 drop, Daily at bedtime    metoprolol succinate (TOPROL-XL) 25 mg, Oral, Daily    Multiple Vitamins-Minerals (CENTRUM SILVER PO) Oral    pravastatin (PRAVACHOL) 20 mg, Oral, Daily    tamsulosin (FLOMAX) 0.4 mg, Daily with dinner    Allergies[1]   Social History[2] Family History[3]       Objective :                   Vitals I/O      Most Recent Min/Max in 24hrs   Temp 99 °F (37.2 °C) Temp  Min: 97.6 °F (36.4 °C)  Max: 100.2 °F (37.9 °C)   Pulse 104 Pulse  Min: 76  Max: 109   Resp (!) 46 Resp  Min: 20  Max: 51   /70 BP  Min: 108/55  Max: 176/77   O2 Sat 97 % SpO2  Min: 84 %  Max: 98 %      Intake/Output Summary (Last 24 hours) at 6/10/2025 1739  Last data filed at 6/10/2025 1701  Gross per 24 hour   Intake 2000 ml   Output 675 ml   Net 1325 ml       Diet Cardiovascular; Lo Cholesterol    Invasive Monitoring           Physical Exam   Physical Exam  Vitals and nursing note reviewed.   Eyes:      Extraocular Movements: Extraocular movements intact.      Comments: R eye implant  Skin:     General: Skin is warm and dry.      Capillary Refill: Capillary refill takes less than 2 seconds.   HENT:      Mouth/Throat:      Mouth: Mucous membranes are moist.   Neck:      Vascular: JVD present.   Cardiovascular:       Rate and Rhythm: Tachycardia present. Rhythm irregular.      Pulses: Normal pulses.      Comments: AF in 100s  Musculoskeletal:      Right lower leg: Trace Edema present.      Left lower leg: Trace Edema present.   Abdominal: General: Bowel sounds are normal. There is no distension.      Palpations: Abdomen is soft.      Tenderness: There is no abdominal tenderness.   Constitutional:       General: He is awake. He is in acute distress.      Appearance: He is ill-appearing.      Interventions: Face mask in place.      Comments: NRB   Pulmonary:      Effort: Tachypnea, accessory muscle usage and accessory muscle usage present.      Breath sounds: Wheezing and rales present.      Comments: Diffuse expiratory wheezing/rales  Psychiatric:         Behavior: Behavior is cooperative.   Neurological:      General: No focal deficit present.      Mental Status: He is alert and oriented to person, place and time.          Diagnostic Studies        Lab Results: I have reviewed the following results:     Medications:  Scheduled PRN   [START ON 6/11/2025] amLODIPine, 2.5 mg, Daily  apixaban, 5 mg, BID  [START ON 6/11/2025] azithromycin, 500 mg, Q24H  [START ON 6/11/2025] cefTRIAXone, 1,000 mg, Q24H  chlorhexidine, 15 mL, Q12H HORTENCIA  docusate sodium, 100 mg, BID  flecainide, 50 mg, BID  guaiFENesin, 600 mg, BID  latanoprost, 1 drop, HS  levalbuterol, 1.25 mg, TID  [START ON 6/11/2025] lisinopril, 10 mg, Daily  metoprolol succinate, 25 mg, Daily  [START ON 6/11/2025] multivitamin-minerals, 1 tablet, Daily  polyethylene glycol, 17 g, Daily  pravastatin, 20 mg, Daily With Dinner  sodium chloride, 4 mL, TID  tamsulosin, 0.4 mg, Daily With Dinner      acetaminophen, 650 mg, Q6H PRN  albuterol, 2.5 mg, Q4H PRN  labetalol, 10 mg, Q4H PRN  ondansetron, 4 mg, Q6H PRN       Continuous          Labs:   CBC    Recent Labs     06/10/25  0836 06/10/25  1623   WBC 12.59*  --    HGB 12.3 12.2   HCT 38.5 36*     --      BMP    Recent Labs      06/10/25  0836 06/10/25  1623   SODIUM 131*  --    K 4.0  --    CL 98  --    CO2 26 22   AGAP 7  --    BUN 15  --    CREATININE 1.05  --    CALCIUM 8.7  --        Coags    Recent Labs     06/10/25  0836   INR 2.41*   PTT 49*        Additional Electrolytes  Recent Labs     06/10/25  1623 06/10/25  1729   CAIONIZED 1.16 1.01*          Blood Gas    No recent results  No recent results LFTs  Recent Labs     06/10/25  0836   ALT 10   AST 14   ALKPHOS 55   ALB 4.1   TBILI 1.59*       Infectious  Recent Labs     06/10/25  0836   PROCALCITONI 0.62*     Glucose  Recent Labs     06/10/25  0836   GLUC 125               [1]   Allergies  Allergen Reactions    Demerol [Meperidine]      Unknown reaction    [2]   Social History  Tobacco Use    Smoking status: Never    Smokeless tobacco: Never   Substance Use Topics    Alcohol use: Yes     Comment: 12 drinks monthly    Drug use: No   [3] No family history on file.

## 2025-06-10 NOTE — CODE DOCUMENTATION
Discussed medical course so far with internal medicine team and critical care np. Discussed plan of care: diigore pt and transfer to ICU as SD1

## 2025-06-10 NOTE — ASSESSMENT & PLAN NOTE
Present on arrival to ED AEB tachycardia, tachypnea, leukocytosis  In setting of RLL pneumonia   Imaging as noted above  COVID/FLU/RSV negative   Blood cultures pending  UA w/out evidence of infection  Urine strep/legionella pending  Obtain sputum culture if able  LA WNL  Procalcitonin 0.62  Is s/p 3L crystalloid - hold on further volume given overload  Continue IV Ceftriaxone and Azithromycin for CAP  Trend fever and WBC curve

## 2025-06-10 NOTE — ASSESSMENT & PLAN NOTE
Admitted earlier today under SLIM for sepsis 2/2 RLL pneumonia w/only 2L NC requirement - rapid response called this afternoon for increased WOB/hypoxia requiring escalation to MFNC  Suspect this was initially in setting of RLL pneumonia w/small R pleural effusion (likely parapneumonic), however suspect escalation in O2 related to flash pulmonary edema   Imaging revealed significant right lower lobe pneumonia and small right pleural effusion w/mediastinal and hilar lymph nodes (likely reactive)  COVID/FLU/RSV negative  Urine strep/legionella pending  Obtain sputum culture if able  Hold further volume resuscitation - is now s/p IV Lasix 80 mg x1  Will start scheduled Xopenex/HTS to assist w/secretion clearance  Continue Mucinex  Consider diagnostic/therapeutic R thoracentesis if needed - at this time, appears minimal   Would consider BIPAP, however will hold off for now as patient endorses nausea w/vomiting - will transition from NRB to MFNC and increase to HFNC if needed  Aggressive pulmonary hygiene to include flutter valve/IS, deep breathing exercises, etc.  Wean O2 for SpO2 > 88%

## 2025-06-10 NOTE — QUICK NOTE
Patient admitted into ICU with the following belongings:    Hat  Pair of sneakers  Pant  Sock  T-shirt  Flannel long sleeve shirt  Underwear  Wallet (His wallet was given to his significant other to take home).     All belongings were kept in room closet.

## 2025-06-10 NOTE — PLAN OF CARE
Problem: Potential for Falls  Goal: Patient will remain free of falls  Description: INTERVENTIONS:  - Educate patient/family on patient safety including physical limitations  - Instruct patient to call for assistance with activity   - Consider consulting OT/PT to assist with strengthening/mobility based on AM PAC & JH-HLM score  - Consult OT/PT to assist with strengthening/mobility   - Keep Call bell within reach  - Keep bed low and locked with side rails adjusted as appropriate  - Keep care items and personal belongings within reach  - Initiate and maintain comfort rounds  - Make Fall Risk Sign visible to staff  - Offer Toileting every  Hours, in advance of need  - Initiate/Maintain alarm  - Obtain necessary fall risk management equipment:   - Apply yellow socks and bracelet for high fall risk patients  - Consider moving patient to room near nurses station  Outcome: Progressing     Problem: PAIN - ADULT  Goal: Verbalizes/displays adequate comfort level or baseline comfort level  Description: Interventions:  - Encourage patient to monitor pain and request assistance  - Assess pain using appropriate pain scale  - Administer analgesics as ordered based on type and severity of pain and evaluate response  - Implement non-pharmacological measures as appropriate and evaluate response  - Consider cultural and social influences on pain and pain management  - Notify physician/advanced practitioner if interventions unsuccessful or patient reports new pain  - Educate patient/family on pain management process including their role and importance of  reporting pain   - Provide non-pharmacologic/complimentary pain relief interventions  Outcome: Progressing

## 2025-06-10 NOTE — ASSESSMENT & PLAN NOTE
As evidenced by tachypnea (24), tachycardia (104), temperature 100.1 and WBC 12.59 likely secondary to pneumonia  Lactic acid 1.0  Procalcitonin 0.62, will repeat in a.m.  CT chest with findings significant for right lower lobe pneumonia and small right pleural effusion.  EKG showed A-fib  Follow pneumonia pathway  ED gave 3 L bolus IVF  Received IV cefepime x 1 dose in ED  Will start IV ceftriaxone every 24 hours from tomorrow  Azithromycin IV x 1 dose followed by azithromycin 500 mg p.o. every 24 hours x 4 days  Obtain urine antigens  Sputum culture if able  Mucinex for cough  Incentive spirometry  IV Solu-Medrol 40 mg every 8 hours  Monitor fever and hemodynamics

## 2025-06-10 NOTE — ED ATTENDING ATTESTATION
6/10/2025  I, Brody Riggs MD, saw and evaluated the patient. I have discussed the patient with the resident/non-physician practitioner and agree with the resident's/non-physician practitioner's findings, Plan of Care, and MDM as documented in the resident's/non-physician practitioner's note, except where noted. All available labs and Radiology studies were reviewed.  I was present for key portions of any procedure(s) performed by the resident/non-physician practitioner and I was immediately available to provide assistance.       At this point I agree with the current assessment done in the Emergency Department.  I have conducted an independent evaluation of this patient a history and physical is as follows:    ED Course     74-year-old male who presented to ED with complaint of 2-day history of nausea vomiting fever chills.  Patient states also feels weak.  Does appear to be ill-appearing.  Also complaining of cough.  Patient found to be febrile in the ED with a temp of 100.2.  Saturation was in the low 87 placed on 3 L of cannula.  Differential diagnoses rule out pneumonia/COVID/flu/RSV/diverticulitis/appendicitis/cholecystitis perforated bowel/obstruction/viral syndrome  Plan recommending obtaining sepsis lab CT chest abdomen pelvis fluid antibiotics Tylenol  General: VS reviewed which shows temp of 100.2 heart rate 87 oxygen saturation is 87% on room air placed on nasal cannula  Appears ill appearing  awake, alert.   Well-nourished, well-developed. Appears stated age.   Speaking normally in full sentences.   Head: Normocephalic, atraumatic  Eyes: EOM-I. No diplopia.   No hyphema.   No subconjunctival hemorrhages.  Symmetrical lids.   ENT: Atraumatic external nose and ears.    MMM  No malocclusion. No stridor. Normal phonation. No drooling. Normal swallowing.   Neck: No JVD.  CV: irregular rhythm   Lungs:   Positive tachypnea rhonchi noted  MSK:   FROM spontaneously  Skin: Dry, intact.   Neuro: Awake, alert,  GCS15, CN II-XII grossly intact.   Motor grossly intact.  Psychiatric/Behavioral: Appropriate mood and affect  This EKG was interpreted by me. The EKG demonstrates atrial fibrillation, normal intervals and axis, normal QRS, no acute ST changes present.   The cardiac monitor revealed irregular rhythm with a rate of 94 as interpreted by me. The cardiac monitor was ordered secondary to the patient’s history of  sob/hypoxia and to monitor the patient for dysrhythmia.  This x-ray was read by me concerning for pneumonia will continue with antibiotics sepsis protocol  Disposition admit  Critical Care Time Statement: Upon my evaluation, this patient had a high probability of imminent or life-threatening deterioration due to respiratory distress secondary to pneumonia hypoxia which required my direct attention, intervention, and personal management.  I spent a total of 75 minutes directly providing critical care services, including interpretation of complex medical databases, evaluating for the presence of life-threatening injuries or illnesses, management of organ system failure(s) , complex medical decision making (to support/prevent further life-threatening deterioration)., interpretation of hemodynamic data, and titration of continuous IV medications (drips). This time is exclusive of procedures, teaching, treating other patients, family meetings, and any prior time recorded by providers other than myself.     Critical Care Time  Procedures       no

## 2025-06-10 NOTE — ASSESSMENT & PLAN NOTE
Admission sodium 131 likely secondary to poor oral intake and nausea/vomiting  ED give 3 L bolus IVF  BMP in a.m.

## 2025-06-10 NOTE — RESPIRATORY THERAPY NOTE
"RT Protocol Note  Ramon Keith 74 y.o. male MRN: [id number]  Unit/Bed#: ICU 03-01 Encounter: [id number]    Assessment    Principal Problem:    Acute respiratory failure with hypoxia (HCC)  Active Problems:    Atrial fibrillation (HCC)    Hyperlipidemia    Sepsis due to pneumonia (HCC)    Pulmonary nodule    Hypertensive urgency    Flash pulmonary edema (HCC)    Adrenal nodule (HCC)    BPH (benign prostatic hyperplasia)      Home Pulmonary Medications:  none  Home Devices/Therapy: Other (Comment) (None)    Past Medical History[1]  Social History[2]    Subjective         Objective    Physical Exam:   Assessment Type: (P) During-treatment  General Appearance: (P) Awake, Alert  Respiratory Pattern: (P) Normal  Chest Assessment: (P) Chest expansion symmetrical  Bilateral Breath Sounds: (P) Diminished, Coarse  Cough: Non-productive    Vitals:  Blood pressure 150/70, pulse 104, temperature 99 °F (37.2 °C), temperature source Temporal, resp. rate (!) 46, height 5' 11\" (1.803 m), weight 94.6 kg (208 lb 8.9 oz), SpO2 97%.          Imaging and other studies: Results Review Statement: I reviewed radiology reports from this admission including: chest xray.          Plan    Respiratory Plan:  (Patient SOB on exertion/ States no pulmonary history)  Airway Clearance Plan: (P) Flutter     Resp Comments: (P) Pt ordered flutter by CRNP. Pt c/o cough but feels he cannot bring up mucus.        [1]   Past Medical History:  Diagnosis Date    Arthritis     Atrial fibrillation (HCC)     Cataract     Glaucoma     Right eye    Hypertension    [2]   Social History  Socioeconomic History    Marital status: Single   Tobacco Use    Smoking status: Never    Smokeless tobacco: Never   Substance and Sexual Activity    Alcohol use: Yes     Comment: 12 drinks monthly    Drug use: No     Social Drivers of Health     Food Insecurity: No Food Insecurity (6/10/2025)    Nursing - Inadequate Food Risk Classification     Ran Out of Food in the Last Year: " Never true   Transportation Needs: No Transportation Needs (6/10/2025)    Nursing - Transportation Risk Classification     Lack of Transportation: No   Intimate Partner Violence: Unknown (6/10/2025)    Nursing IPS     Physically Hurt by Someone: No     Hurt or Threatened by Someone: No   Housing Stability: Unknown (6/10/2025)    Nursing: Inadequate Housing Risk Classification     Unable to Pay for Housing in the Last Year: No     Has Housin

## 2025-06-10 NOTE — ASSESSMENT & PLAN NOTE
Rapid response called this afternoon for escalating O2 requirements after receiving volume resuscitation - noted to have increased WOB/hypoxia/diffuse rales w/expiratory wheezing  Previous TTE from 2018 revealed EF 65-70% w/mild concentric hypertrophy  Is s/p IV Lasix 80 mg x1  Holding on BIPAP for now given nausea/vomiting - plan to place on MFNC and escalate to HFNC if needed  Repeat TTE   Monitor I/O  Daily weights

## 2025-06-11 ENCOUNTER — APPOINTMENT (INPATIENT)
Dept: NON INVASIVE DIAGNOSTICS | Facility: HOSPITAL | Age: 75
DRG: 871 | End: 2025-06-11
Payer: COMMERCIAL

## 2025-06-11 LAB
ANION GAP SERPL CALCULATED.3IONS-SCNC: 9 MMOL/L (ref 4–13)
ATRIAL RATE: 166 BPM
BSA FOR ECHO PROCEDURE: 2.15 M2
BUN SERPL-MCNC: 15 MG/DL (ref 5–25)
CA-I BLD-SCNC: 1.06 MMOL/L (ref 1.12–1.32)
CALCIUM SERPL-MCNC: 8.5 MG/DL (ref 8.4–10.2)
CHLORIDE SERPL-SCNC: 101 MMOL/L (ref 96–108)
CO2 SERPL-SCNC: 25 MMOL/L (ref 21–32)
CREAT SERPL-MCNC: 1.01 MG/DL (ref 0.6–1.3)
ERYTHROCYTE [DISTWIDTH] IN BLOOD BY AUTOMATED COUNT: 13.3 % (ref 11.6–15.1)
EST. AVERAGE GLUCOSE BLD GHB EST-MCNC: 126 MG/DL
GFR SERPL CREATININE-BSD FRML MDRD: 71 ML/MIN/1.73SQ M
GLUCOSE SERPL-MCNC: 126 MG/DL (ref 65–140)
HBA1C MFR BLD: 6 %
HCT VFR BLD AUTO: 36.4 % (ref 36.5–49.3)
HGB BLD-MCNC: 11.5 G/DL (ref 12–17)
MAGNESIUM SERPL-MCNC: 1.8 MG/DL (ref 1.9–2.7)
MCH RBC QN AUTO: 30.4 PG (ref 26.8–34.3)
MCHC RBC AUTO-ENTMCNC: 31.6 G/DL (ref 31.4–37.4)
MCV RBC AUTO: 96 FL (ref 82–98)
PHOSPHATE SERPL-MCNC: 2.4 MG/DL (ref 2.3–4.1)
PLATELET # BLD AUTO: 198 THOUSANDS/UL (ref 149–390)
PMV BLD AUTO: 9.8 FL (ref 8.9–12.7)
POTASSIUM SERPL-SCNC: 3.9 MMOL/L (ref 3.5–5.3)
PROCALCITONIN SERPL-MCNC: 0.91 NG/ML
QRS AXIS: 75 DEGREES
QRSD INTERVAL: 98 MS
QT INTERVAL: 366 MS
QTC INTERVAL: 457 MS
RBC # BLD AUTO: 3.78 MILLION/UL (ref 3.88–5.62)
SL CV LV EF: 55
SODIUM SERPL-SCNC: 135 MMOL/L (ref 135–147)
T WAVE AXIS: 70 DEGREES
VENTRICULAR RATE: 94 BPM
WBC # BLD AUTO: 12.38 THOUSAND/UL (ref 4.31–10.16)

## 2025-06-11 PROCEDURE — 94668 MNPJ CHEST WALL SBSQ: CPT

## 2025-06-11 PROCEDURE — 82330 ASSAY OF CALCIUM: CPT | Performed by: NURSE PRACTITIONER

## 2025-06-11 PROCEDURE — 99232 SBSQ HOSP IP/OBS MODERATE 35: CPT | Performed by: INTERNAL MEDICINE

## 2025-06-11 PROCEDURE — 80048 BASIC METABOLIC PNL TOTAL CA: CPT | Performed by: NURSE PRACTITIONER

## 2025-06-11 PROCEDURE — 84145 PROCALCITONIN (PCT): CPT | Performed by: NURSE PRACTITIONER

## 2025-06-11 PROCEDURE — 93306 TTE W/DOPPLER COMPLETE: CPT | Performed by: INTERNAL MEDICINE

## 2025-06-11 PROCEDURE — 94760 N-INVAS EAR/PLS OXIMETRY 1: CPT

## 2025-06-11 PROCEDURE — 84100 ASSAY OF PHOSPHORUS: CPT | Performed by: NURSE PRACTITIONER

## 2025-06-11 PROCEDURE — NC001 PR NO CHARGE: Performed by: NURSE PRACTITIONER

## 2025-06-11 PROCEDURE — 83735 ASSAY OF MAGNESIUM: CPT | Performed by: NURSE PRACTITIONER

## 2025-06-11 PROCEDURE — 85027 COMPLETE CBC AUTOMATED: CPT | Performed by: NURSE PRACTITIONER

## 2025-06-11 PROCEDURE — 97163 PT EVAL HIGH COMPLEX 45 MIN: CPT

## 2025-06-11 PROCEDURE — 83036 HEMOGLOBIN GLYCOSYLATED A1C: CPT | Performed by: NURSE PRACTITIONER

## 2025-06-11 PROCEDURE — 92610 EVALUATE SWALLOWING FUNCTION: CPT

## 2025-06-11 PROCEDURE — 94664 DEMO&/EVAL PT USE INHALER: CPT

## 2025-06-11 PROCEDURE — 94640 AIRWAY INHALATION TREATMENT: CPT

## 2025-06-11 PROCEDURE — 97167 OT EVAL HIGH COMPLEX 60 MIN: CPT

## 2025-06-11 PROCEDURE — 93306 TTE W/DOPPLER COMPLETE: CPT

## 2025-06-11 PROCEDURE — 93010 ELECTROCARDIOGRAM REPORT: CPT | Performed by: INTERNAL MEDICINE

## 2025-06-11 RX ORDER — AMLODIPINE BESYLATE 5 MG/1
5 TABLET ORAL DAILY
Status: DISCONTINUED | OUTPATIENT
Start: 2025-06-12 | End: 2025-06-17 | Stop reason: HOSPADM

## 2025-06-11 RX ORDER — GUAIFENESIN 600 MG/1
1200 TABLET, EXTENDED RELEASE ORAL 2 TIMES DAILY
Status: DISCONTINUED | OUTPATIENT
Start: 2025-06-11 | End: 2025-06-17 | Stop reason: HOSPADM

## 2025-06-11 RX ORDER — MAGNESIUM SULFATE HEPTAHYDRATE 40 MG/ML
2 INJECTION, SOLUTION INTRAVENOUS ONCE
Status: COMPLETED | OUTPATIENT
Start: 2025-06-11 | End: 2025-06-11

## 2025-06-11 RX ORDER — CALCIUM GLUCONATE 20 MG/ML
1 INJECTION, SOLUTION INTRAVENOUS ONCE
Status: COMPLETED | OUTPATIENT
Start: 2025-06-11 | End: 2025-06-11

## 2025-06-11 RX ORDER — METOPROLOL TARTRATE 1 MG/ML
5 INJECTION, SOLUTION INTRAVENOUS ONCE
Status: COMPLETED | OUTPATIENT
Start: 2025-06-11 | End: 2025-06-11

## 2025-06-11 RX ORDER — POLYETHYLENE GLYCOL 3350 17 G/17G
17 POWDER, FOR SOLUTION ORAL DAILY PRN
Status: DISCONTINUED | OUTPATIENT
Start: 2025-06-11 | End: 2025-06-17 | Stop reason: HOSPADM

## 2025-06-11 RX ORDER — FAMOTIDINE 20 MG/1
20 TABLET, FILM COATED ORAL 2 TIMES DAILY
Status: DISCONTINUED | OUTPATIENT
Start: 2025-06-11 | End: 2025-06-17 | Stop reason: HOSPADM

## 2025-06-11 RX ORDER — AMLODIPINE BESYLATE 2.5 MG/1
2.5 TABLET ORAL ONCE
Status: COMPLETED | OUTPATIENT
Start: 2025-06-11 | End: 2025-06-11

## 2025-06-11 RX ORDER — LIDOCAINE 50 MG/G
1 PATCH TOPICAL DAILY
Status: DISCONTINUED | OUTPATIENT
Start: 2025-06-11 | End: 2025-06-17 | Stop reason: HOSPADM

## 2025-06-11 RX ORDER — FAMOTIDINE 20 MG/1
20 TABLET, FILM COATED ORAL 2 TIMES DAILY
Status: DISCONTINUED | OUTPATIENT
Start: 2025-06-11 | End: 2025-06-11

## 2025-06-11 RX ADMIN — AZITHROMYCIN DIHYDRATE 500 MG: 250 TABLET ORAL at 08:50

## 2025-06-11 RX ADMIN — FAMOTIDINE 20 MG: 20 TABLET, FILM COATED ORAL at 14:31

## 2025-06-11 RX ADMIN — CALCIUM GLUCONATE 1 G: 20 INJECTION, SOLUTION INTRAVENOUS at 05:26

## 2025-06-11 RX ADMIN — MAGNESIUM SULFATE HEPTAHYDRATE 2 G: 40 INJECTION, SOLUTION INTRAVENOUS at 05:26

## 2025-06-11 RX ADMIN — GUAIFENESIN 1200 MG: 600 TABLET ORAL at 18:16

## 2025-06-11 RX ADMIN — METOPROLOL SUCCINATE 25 MG: 25 TABLET, EXTENDED RELEASE ORAL at 08:50

## 2025-06-11 RX ADMIN — CEFTRIAXONE SODIUM 1000 MG: 10 INJECTION, POWDER, FOR SOLUTION INTRAVENOUS at 10:00

## 2025-06-11 RX ADMIN — GUAIFENESIN 600 MG: 600 TABLET ORAL at 08:51

## 2025-06-11 RX ADMIN — LISINOPRIL 10 MG: 10 TABLET ORAL at 08:50

## 2025-06-11 RX ADMIN — CHLORHEXIDINE GLUCONATE 15 ML: 1.2 SOLUTION ORAL at 08:51

## 2025-06-11 RX ADMIN — FLECAINIDE ACETATE 50 MG: 50 TABLET ORAL at 18:16

## 2025-06-11 RX ADMIN — LEVALBUTEROL HYDROCHLORIDE 1.25 MG: 1.25 SOLUTION RESPIRATORY (INHALATION) at 07:23

## 2025-06-11 RX ADMIN — TAMSULOSIN HYDROCHLORIDE 0.4 MG: 0.4 CAPSULE ORAL at 16:13

## 2025-06-11 RX ADMIN — LATANOPROST 1 DROP: 50 SOLUTION OPHTHALMIC at 21:54

## 2025-06-11 RX ADMIN — LEVALBUTEROL HYDROCHLORIDE 1.25 MG: 1.25 SOLUTION RESPIRATORY (INHALATION) at 13:09

## 2025-06-11 RX ADMIN — APIXABAN 5 MG: 5 TABLET, FILM COATED ORAL at 08:50

## 2025-06-11 RX ADMIN — FLECAINIDE ACETATE 50 MG: 50 TABLET ORAL at 08:50

## 2025-06-11 RX ADMIN — SODIUM CHLORIDE SOLN NEBU 3% 4 ML: 3 NEBU SOLN at 13:09

## 2025-06-11 RX ADMIN — ACETAMINOPHEN 650 MG: 325 TABLET ORAL at 16:19

## 2025-06-11 RX ADMIN — METOROPROLOL TARTRATE 5 MG: 5 INJECTION, SOLUTION INTRAVENOUS at 23:20

## 2025-06-11 RX ADMIN — AMLODIPINE BESYLATE 2.5 MG: 2.5 TABLET ORAL at 16:13

## 2025-06-11 RX ADMIN — SODIUM CHLORIDE SOLN NEBU 3% 4 ML: 3 NEBU SOLN at 07:23

## 2025-06-11 RX ADMIN — PRAVASTATIN SODIUM 20 MG: 20 TABLET ORAL at 08:50

## 2025-06-11 RX ADMIN — AMLODIPINE BESYLATE 2.5 MG: 2.5 TABLET ORAL at 08:50

## 2025-06-11 RX ADMIN — APIXABAN 5 MG: 5 TABLET, FILM COATED ORAL at 18:16

## 2025-06-11 RX ADMIN — ACETAMINOPHEN 650 MG: 325 TABLET ORAL at 04:24

## 2025-06-11 RX ADMIN — Medication 1 TABLET: at 08:50

## 2025-06-11 RX ADMIN — LIDOCAINE 1 PATCH: 700 PATCH TOPICAL at 16:13

## 2025-06-11 NOTE — PLAN OF CARE
Problem: Potential for Falls  Goal: Patient will remain free of falls  Description: INTERVENTIONS:  - Educate patient/family on patient safety including physical limitations  - Instruct patient to call for assistance with activity   - Consider consulting OT/PT to assist with strengthening/mobility based on AM PAC & JH-HLM score  - Consult OT/PT to assist with strengthening/mobility   - Keep Call bell within reach  - Keep bed low and locked with side rails adjusted as appropriate  - Keep care items and personal belongings within reach  - Initiate and maintain comfort rounds  - Make Fall Risk Sign visible to staff  - Offer Toileting every 2 Hours, in advance of need  - Initiate/Maintain bed alarm  - Obtain necessary fall risk management equipment  - Apply yellow socks and bracelet for high fall risk patients  - Consider moving patient to room near nurses station  Outcome: Progressing     Problem: PAIN - ADULT  Goal: Verbalizes/displays adequate comfort level or baseline comfort level  Description: Interventions:  - Encourage patient to monitor pain and request assistance  - Assess pain using appropriate pain scale  - Administer analgesics as ordered based on type and severity of pain and evaluate response  - Implement non-pharmacological measures as appropriate and evaluate response  - Consider cultural and social influences on pain and pain management  - Notify physician/advanced practitioner if interventions unsuccessful or patient reports new pain  - Educate patient/family on pain management process including their role and importance of  reporting pain   - Provide non-pharmacologic/complimentary pain relief interventions  Outcome: Progressing     Problem: INFECTION - ADULT  Goal: Absence or prevention of progression during hospitalization  Description: INTERVENTIONS:  - Assess and monitor for signs and symptoms of infection  - Monitor lab/diagnostic results  - Monitor all insertion sites, i.e. indwelling lines,  tubes, and drains  - Monitor endotracheal if appropriate and nasal secretions for changes in amount and color  - Riddle appropriate cooling/warming therapies per order  - Administer medications as ordered  - Instruct and encourage patient and family to use good hand hygiene technique  - Identify and instruct in appropriate isolation precautions for identified infection/condition  Outcome: Progressing     Problem: RESPIRATORY - ADULT  Goal: Achieves optimal ventilation and oxygenation  Description: INTERVENTIONS:  - Assess for changes in respiratory status  - Assess for changes in mentation and behavior  - Position to facilitate oxygenation and minimize respiratory effort  - Oxygen administered by appropriate delivery if ordered  - Initiate smoking cessation education as indicated  - Encourage broncho-pulmonary hygiene including cough, deep breathe, Incentive Spirometry  - Assess the need for suctioning and aspirate as needed  - Assess and instruct to report SOB or any respiratory difficulty  - Respiratory Therapy support as indicated  Outcome: Progressing

## 2025-06-11 NOTE — PROGRESS NOTES
Progress Note - Critical Care/ICU   Name: Ramon Keith 74 y.o. male I MRN: [id number]  Unit/Bed#: ICU 03-01 I Date of Admission: 6/10/2025   Date of Service: 6/11/2025 I Hospital Day: 1      Assessment & Plan  Acute respiratory failure with hypoxia (HCC)  Admitted earlier today under SLIM for sepsis 2/2 RLL pneumonia w/only 2L NC requirement - rapid response called this afternoon for increased WOB/hypoxia requiring escalation to MFNC  Suspect this was initially in setting of RLL pneumonia w/small R pleural effusion (likely parapneumonic), however suspect escalation in O2 related to flash pulmonary edema   Imaging revealed significant right lower lobe pneumonia and small right pleural effusion w/mediastinal and hilar lymph nodes (likely reactive)  COVID/FLU/RSV negative  Urine strep/legionella pending  Obtain sputum culture if able  Hold further volume resuscitation - is now s/p IV Lasix 80 mg x1  Will start scheduled Xopenex/HTS to assist w/secretion clearance  Continue Mucinex  Consider diagnostic/therapeutic R thoracentesis if needed - at this time, appears minimal   Continue Midflow   Aggressive pulmonary hygiene to include flutter valve/IS, deep breathing exercises, etc.  Wean O2 for SpO2 > 88%    Sepsis due to pneumonia (HCC)  Present on arrival to ED AEB tachycardia, tachypnea, leukocytosis  In setting of RLL pneumonia   Imaging as noted above  COVID/FLU/RSV negative   Blood cultures pending  UA w/out evidence of infection  Urine strep/legionella pending  Obtain sputum culture if able  LA WNL  Procalcitonin 0.62  Is s/p 3L crystalloid - hold on further volume given overload  Continue IV Ceftriaxone and Azithromycin for CAP  Trend fever and WBC curve  Flash pulmonary edema (HCC)  Rapid response called this afternoon for escalating O2 requirements after receiving volume resuscitation - noted to have increased WOB/hypoxia/diffuse rales w/expiratory wheezing  Previous TTE from 2018 revealed EF 65-70% w/mild  concentric hypertrophy  Is s/p IV Lasix 80 mg x1  MFNC and escalate to HFNC if needed  Repeat TTE   Monitor I/O  Daily weights  Hypertensive urgency  Noted to be hypertensive at time of rapid response  Has underlying HTN and received 3L crystalloid  Is s/p IV Lasix 80 mg x1  Will start PRN IV Labetalol for SBP > 160  Continue home amlodipine, Toprol XL, and ACE-I   Monitor BP closely - will attempt to decrease SBP < 160 to avoid further flash pulmonary edema  Atrial fibrillation (HCC)  Is s/p successful cardioversion in past, however currently in AF (rate-controlled)  Continue home AC w/Eliquis  Continue home BB and Flecainide   Continue cardiac monitoring  Pulmonary nodule  Imaging revealed solid 5 mm left upper lobe pulmonary nodule  Will require follow-up w/repeat imaging as OP  Adrenal nodule (HCC)  Imaging revealed 1.8 cm right adrenal nodule  Will require follow-up as OP w/either non-contrast abdomen CT or MRI in 12 months  Hyperlipidemia  Continue home statin  BPH (benign prostatic hyperplasia)  Continue home Flomax  Disposition: Stepdown Level 2    ICU Core Measures     A: Assess, Prevent, and Manage Pain Has pain been assessed? Yes  Need for changes to pain regimen? N/A   B: Both SAT/SAT  N/A   C: Choice of Sedation RASS Goal: N/A patient not on sedation  Need for changes to sedation or analgesia regimen? N/A   D: Delirium CAM-ICU: Negative   E: Early Mobility  Plan for early mobility? Yes   F: Family Engagement Plan for family engagement today? Yes       Antibiotic Review: Awaiting culture results.       Prophylaxis:  VTE VTE covered by:  apixaban, Oral, 5 mg at 06/10/25 1722       Stress Ulcer  not ordered         24 Hour Events : Patient diuresed well with lasix and oxygen weaned down to 6 L midflow.     Subjective   Review of Systems: Review of Systems   Constitutional:  Positive for fatigue. Negative for chills and fever.   HENT:  Negative for sore throat and trouble swallowing.    Respiratory:   Positive for cough and shortness of breath. Negative for chest tightness.    Cardiovascular:  Negative for chest pain, palpitations and leg swelling.   Gastrointestinal:  Negative for abdominal distention, abdominal pain, diarrhea, nausea and vomiting.   Genitourinary:  Negative for difficulty urinating.   Musculoskeletal:  Negative for arthralgias and myalgias.   Skin:  Negative for color change.   Neurological:  Negative for dizziness and weakness.   Psychiatric/Behavioral:  The patient is not nervous/anxious.    All other systems reviewed and are negative.      Objective :                   Vitals I/O      Most Recent Min/Max in 24hrs   Temp 99 °F (37.2 °C) Temp  Min: 97.6 °F (36.4 °C)  Max: 100.2 °F (37.9 °C)   Pulse 96 Pulse  Min: 76  Max: 109   Resp (!) 24 Resp  Min: 20  Max: 51   /78 BP  Min: 108/55  Max: 176/77   O2 Sat 97 % SpO2  Min: 84 %  Max: 100 %      Intake/Output Summary (Last 24 hours) at 6/11/2025 0349  Last data filed at 6/11/2025 0000  Gross per 24 hour   Intake 2320 ml   Output 2475 ml   Net -155 ml       Diet Cardiovascular; Lo Cholesterol    Invasive Monitoring           Physical Exam   Physical Exam  Eyes:      Extraocular Movements: Extraocular movements intact.      Conjunctiva/sclera: Conjunctivae normal.      Pupils: Pupils are equal, round, and reactive to light.   Skin:     General: Skin is warm and dry.      Capillary Refill: Capillary refill takes less than 2 seconds.   HENT:      Mouth/Throat:      Mouth: Mucous membranes are dry.   Cardiovascular:      Rate and Rhythm: Normal rate and regular rhythm.      Pulses: Normal pulses.   Abdominal: General: Bowel sounds are normal. There is no distension.      Palpations: Abdomen is soft.      Tenderness: There is no abdominal tenderness.   Constitutional:       Appearance: He is not ill-appearing or toxic-appearing.   Pulmonary:      Effort: Pulmonary effort is normal.      Breath sounds: Rhonchi present.   Psychiatric:          Speech: Speech normal.         Behavior: Behavior normal. Behavior is cooperative.   Neurological:      Mental Status: He is oriented to person, place, and time and oriented to person, place and time.      GCS: GCS eye subscore is 4. GCS verbal subscore is 5. GCS motor subscore is 6.          Diagnostic Studies        Lab Results: I have reviewed the following results:     Medications:  Scheduled PRN   amLODIPine, 2.5 mg, Daily  apixaban, 5 mg, BID  azithromycin, 500 mg, Q24H  cefTRIAXone, 1,000 mg, Q24H  chlorhexidine, 15 mL, Q12H HORTENCIA  docusate sodium, 100 mg, BID  flecainide, 50 mg, BID  guaiFENesin, 600 mg, BID  latanoprost, 1 drop, HS  levalbuterol, 1.25 mg, TID  lisinopril, 10 mg, Daily  metoprolol succinate, 25 mg, Daily  multivitamin-minerals, 1 tablet, Daily  polyethylene glycol, 17 g, Daily  pravastatin, 20 mg, Daily  sodium chloride, 4 mL, TID  tamsulosin, 0.4 mg, Daily With Dinner      acetaminophen, 650 mg, Q6H PRN  albuterol, 2.5 mg, Q4H PRN  labetalol, 10 mg, Q4H PRN  ondansetron, 4 mg, Q6H PRN       Continuous          Labs:   CBC    Recent Labs     06/10/25  0836 06/10/25  1623   WBC 12.59*  --    HGB 12.3 12.2   HCT 38.5 36*     --      BMP    Recent Labs     06/10/25  0836 06/10/25  1623 06/10/25  1729   SODIUM 131*  --  137   K 4.0  --  4.2   CL 98  --  103   CO2 26 22 25   AGAP 7  --  9   BUN 15  --  14   CREATININE 1.05  --  1.05   CALCIUM 8.7  --  8.3*       Coags    Recent Labs     06/10/25  0836   INR 2.41*   PTT 49*        Additional Electrolytes  Recent Labs     06/10/25  1623 06/10/25  1729   MG  --  1.9   PHOS  --  3.1   CAIONIZED 1.16 1.01*          Blood Gas    No recent results  No recent results LFTs  Recent Labs     06/10/25  0836 06/10/25  1729   ALT 10 11   AST 14 14   ALKPHOS 55 54   ALB 4.1 4.0   TBILI 1.59* 1.29*       Infectious  Recent Labs     06/10/25  0836   PROCALCITONI 0.62*     Glucose  Recent Labs     06/10/25  0836 06/10/25  1729   GLUC 125 125         Alert and oriented to person, place and time

## 2025-06-11 NOTE — ASSESSMENT & PLAN NOTE
Rapid response called this afternoon for escalating O2 requirements after receiving volume resuscitation - noted to have increased WOB/hypoxia/diffuse rales w/expiratory wheezing  Previous TTE from 2018 revealed EF 65-70% w/mild concentric hypertrophy  Is s/p IV Lasix 80 mg x1  MFNC and escalate to HFNC if needed  Repeat TTE   Monitor I/O  Daily weights

## 2025-06-11 NOTE — PLAN OF CARE
Problem: OCCUPATIONAL THERAPY ADULT  Goal: Performs self-care activities at highest level of function for planned discharge setting.  See evaluation for individualized goals.  Description: Treatment Interventions: ADL retraining, Functional transfer training, UE strengthening/ROM, Endurance training, Patient/family training, Compensatory technique education, Energy conservation, Activityengagement     See flowsheet documentation for full assessment, interventions and recommendations.   Note: Limitation: Decreased ADL status, Decreased UE strength, Decreased Safe judgement during ADL, Decreased endurance, Decreased self-care trans, Decreased high-level ADLs  Prognosis: Good  Assessment: Pt is a 74 y.o. male seen for OT evaluation s/p admit to [facility] on 6/10/2025 w/ Acute respiratory failure with hypoxia (HCC).  Comorbidities affecting pt's functional performance at time of assessment include: a-fib, hyperlipidemia, pulmonary nodule, edema, BPH, adrenal nodule, HTN, GEE. Personal factors affecting pt at time of IE include:steps to enter environment, difficulty performing ADLS, difficulty performing IADLS , decreased initiation and engagement , health management , and environment. OT order placed to assess Pt's ADLs, cognitive status, and performance during functional tasks in order to maximize safety and independence while making most appropriate d/c recommendations. Prior to admission, pt was I with ADLs, mobility and I with ADLs. Upon evaluation: the following deficits impact occupational performance: weakness, decreased strength, decreased balance, decreased tolerance, impaired problem solving, decreased safety awareness, and increased pain. Pt's clinical presentation is currently stable  given new onset deficits that effect Pt's occupational performance and ability to safely return to [facility] including decrease activity tolerance, decrease standing balance, decrease sitting balance, decrease performance during  ADL tasks, decrease safety awareness , decrease generalized strength, decrease activity engagement, decrease performance during functional transfers, and high fall risk combined with medical complications of hypertension , poor blood pressure control, pain impacting overall mobility status, A-fib, abnormal CBC, decreased skin integrity, multiple readmissions, and need for input for mobility technique/safety.  Pt to benefit from continued skilled OT tx while in the hospital to address deficits as defined above and maximize level of functional independence w ADL's and functional mobility. Occupational Performance areas to address include: grooming, bathing/shower, toilet hygiene, dressing, functional mobility, community mobility, and clothing management. From OT standpoint, recommendation at time of d/c would with minimal intensity OT resources.     Rehab Resource Intensity Level, OT: III (Minimum Resource Intensity)     Sadie Cordova OTR/L

## 2025-06-11 NOTE — PHYSICAL THERAPY NOTE
PHYSICAL THERAPY EVALUATION  NAME:  Ramon Keith  DATE: 06/11/25    AGE:   74 y.o.  Mrn:   [id number]  ADMIT DX:  Diarrhea [R19.7]  Pneumonia [J18.9]  Dyspnea [R06.00]  Nausea and vomiting [R11.2]  Hypoxic [R09.02]  Fever [R50.9]  Atrial fib/flutter, transient (HCC) [I48.91, I48.92]  Problem List: Problem List[1]    Past Medical History  Past Medical History[2]    Past Surgical History  Past Surgical History[3]    Length Of Stay: 1  Performed at least 2 patient identifiers during session: Name and ID bracelet       06/11/25 0924   PT Last Visit   PT Visit Date 06/11/25   Note Type   Note type Evaluation   Pain Assessment   Pain Assessment Tool 0-10   Pain Score 3   Pain Location/Orientation Location: Abdomen   Pain Onset/Description Descriptor: [facility] Pain Intervention(s) Repositioned   Restrictions/Precautions   Weight Bearing Precautions Per Order No   Other Precautions Fall Risk;Pain;Visual impairment;Chair Alarm;Multiple lines;Telemetry;O2;Contact/isolation  (3 L/min)   Home Living   Type of Home Mobile home   Home Layout One level;Stairs to enter without rails  (1 CLARITA)   Bathroom Shower/Tub Tub/shower unit   Bathroom Toilet Standard   Bathroom Equipment Grab bars in shower;Grab bars around toilet;Toilet raiser;Shower chair   Home Equipment Walker;Cane;Wheelchair-manual  (no AD used at baseline)   Prior Function   Level of [place] Independent with ADLs;Independent with functional mobility   Lives With Significant other   IADLs Family/Friend/Other provides transportation;Independent with meal prep;Independent with medication management   Falls in the last 6 months 0   Vocational Retired   General   Family/Caregiver Present No   Cognition   Overall Cognitive Status WFL   Arousal/Participation Alert   Attention Within functional limits   Orientation Level Oriented X4   Memory Within functional limits   Following Commands Follows all commands and directions without difficulty   RLE Assessment    RLE Assessment WFL   LLE Assessment   LLE Assessment WFL   Vision-Basic Assessment   Current Vision Does not wear glasses  (decreased R eye vision)   Light Touch   RLE Light Touch Impaired   LLE Light Touch Impaired   Transfers   Sit to Stand 4  Minimal assistance   Additional items Assist x 1;Increased time required;Verbal cues   Stand to Sit 4  Minimal assistance   Additional items Assist x 1;Armrests;Increased time required;Verbal cues   Additional Comments pt required cues for proper hand placement  (pt denied dizziness with transitional movement)   Ambulation/Elevation   Gait pattern Improper Weight shift;Decreased foot clearance;Forward Flexion;Short stride   Gait Assistance 4  Minimal assist   Additional items Assist x 1;Verbal cues   Assistive Device Rolling walker   Distance 20 ft   Ambulation/Elevation Additional Comments fair safety awareness noted  (assistance with RW navigation)   Balance   Static Sitting Good   Dynamic Sitting Fair +   Static Standing Fair   Dynamic Standing Fair -   Ambulatory Fair -   Endurance Deficit   Endurance Deficit Yes   Endurance Deficit Description pt reported fatigue with activity   Activity Tolerance   Activity Tolerance Patient limited by fatigue   Assessment   Prognosis Fair   Assessment Pt is 74 y.o. male seen for PT evaluation s/p admit to [facility] on 6/10/2025 w/ Acute respiratory failure with hypoxia (HCC). PT consulted to assess pt's functional mobility and d/c needs. Order placed for PT eval and tx, w/ activity as tolerated order. Pt agreeable to PT  session upon arrival, pt found seated OOB in recliner.  PTA, pt was independent w/ all functional mobility w/ no AD, ambulates community distances and elevations, has 1 CLARITA, lives w/ SO in 1 level home, and retired.  Pt to benefit from continued PT tx to address deficits and maximize level of functional independent mobility and consistency. Upon conclusion pt  seated in recliner. Complexity: Comorbidities  affecting pt's physical performance at time of assessment include: a fib, respiratory failure, and sepsis and pneumonia . Personal factors affecting pt at time of IE include: lack of handrail at steps, limited insight into impairments, ambulating with assistive device, and steps to enter home. Please find objective findings from PT assessment regarding body systems outlined above with impairments and limitations including impaired balance, decreased endurance, gait deviations, pain, decreased activity tolerance, decreased functional mobility tolerance, altered sensation, and fall risk.  Pt's clinical presentation is currently unstable/unpredictable seen in pt's presentation of abnormal H&H, abnormal WBC count, abnormal calcium levels, new onset of O2 use, pain, telemetry use, and step down status. The patient's AM-PAC Basic Mobility Inpatient Short Form Raw Score is 19.  Based on patient presentations and impairments, pt would most appropriately benefit from Level 3 resource intensity upon discharge. A Raw score of greater than 16 suggests the patient may benefit from discharge to home. Please also refer to the recommendation of the Physical Therapist for safe discharge planning. RN verbalized pt appropriate for PT session. Pt seen as a co-eval with OT due to the patient's co-morbidities, clinically unstable presentation, and present impairments which are a regression from the patient's baseline.   Barriers to Discharge Inaccessible home environment   Goals   Patient Goals to go home   LTG Expiration Date 06/21/25   Long Term Goal #1 Pt will: Perform bed mobility tasks to modified I to improve ease of bed mobility. Perform transfers to modified I to improve ease of transfers. Perform ambulation with MI and LRAD for 250 feet to increase Indep in home environment. Increase dynamic standing balance to F+ to decrease fall risk. Increase OOB activity tolerance to 10 minutes without s/s of exertion to decrease fall risk.  Navigate up and down  1 step with MI so patient can enter and exit home.   Plan   Treatment/Interventions Functional transfer training;Therapeutic exercise;Endurance training;Gait training;Bed mobility;Equipment eval/education;Elevations   PT Frequency 3-5x/wk   Discharge Recommendation   Rehab Resource Intensity Level, PT III (Minimum Resource Intensity)   Equipment Recommended Walker   Walker Package Recommended Wheeled walker   AM-PAC Basic Mobility Inpatient   Turning in Flat Bed Without Bedrails 4   Lying on Back to Sitting on Edge of Flat Bed Without Bedrails 4   Moving Bed to Chair 3   Standing Up From Chair Using Arms 3   Walk in Room 3   Climb 3-5 Stairs With Railing 2   Basic Mobility Inpatient Raw Score 19   Basic Mobility Standardized Score 42.48   [facility] Highest Level Of Mobility   -HLM Goal 6: Walk 10 steps or more   -HLM Achieved 7: Walk 25 feet or more       Time In: 0908  Time Out: 0924  Total Evaluation Minutes: 16    Charity Atkinson, PT         [1]   Patient Active Problem List  Diagnosis    Atrial fibrillation (HCC)    Hypertension    Hyperlipidemia    Preoperative clearance    Crush injury    Crush injury of hand    GEE (acute kidney injury) (HCC)    Constipation    Sepsis due to pneumonia (HCC)    Hyponatremia    Pulmonary nodule    Acute respiratory failure with hypoxia (HCC)    Hypertensive urgency    Flash pulmonary edema (HCC)    Adrenal nodule (HCC)    BPH (benign prostatic hyperplasia)   [2]   Past Medical History:  Diagnosis Date    Arthritis     Atrial fibrillation (HCC)     Cataract     Glaucoma     Right eye    Hypertension    [3]   Past Surgical History:  Procedure Laterality Date    CAST APPLICATION Left 1/3/2023    Procedure: Application short arm splint;  Surgeon: Sim Portillo MD;  Location: [facility];  Service: Orthopedics    CHEST SURGERY      Unsure of surgery, pt had a trauma and had chest surgery anterior and L posterior    FEMUR FRACTURE SURGERY Left      HAND DEBRIDEMENT Left 1/3/2023    Procedure: Irrigation and debridement of the left hand open index and long finger metacarpal fractures.  (Skin, subcutaneous tissue, muscle, fascia, and bone).  Irrigation and debridement skin, subcutaneous tissue, fascia and tendon left hand ring finger proximal phalanx wound.;  Surgeon: Sim Portillo MD;  Location: [facility];  Service: Orthopedics    HAND FRACTURE REPAIR Left 1/3/2023    Procedure: Open reduction and internal fixation left hand index and long finger metacarpal fractures.  Open reduction and internal fixation left hand long finger proximal phalanx fracture;  Surgeon: Sim Portillo MD;  Location: [facility];  Service: Orthopedics    HIP FRACTURE SURGERY Left     KNEE SURGERY Left

## 2025-06-11 NOTE — UTILIZATION REVIEW
Initial Clinical Review    Admission: Date/Time/Statement:   Admission Orders (From admission, onward)       Ordered        06/10/25 1105  INPATIENT ADMISSION  Once                          Orders Placed This Encounter   Procedures    INPATIENT ADMISSION     Standing Status:   Standing     Number of Occurrences:   1     Level of Care:   Med Surg [16]     Estimated length of stay:   More than 2 Midnights     Certification:   I certify that inpatient services are medically necessary for this patient for a duration of greater than two midnights. See H&P and MD Progress Notes for additional information about the patient's course of treatment.     ED Arrival Information       Expected   -    Arrival   6/10/2025 08:08    Acuity   Urgent              Means of arrival   Walk-In    Escorted by   Family Member    Service   Critical Care/ICU    Admission type   Emergency              Arrival complaint   vomiting diarrhea headache             Chief Complaint   Patient presents with    Diarrhea     Started with diarrhea Saturday, becoming more activity intolerant. Also reporting low grade fever, abdominal pain, shortness of breath and vomiting.       Initial Presentation: 74 y.o. male with hx paroxysmal A-fib on Eliquis and flecainide, HLD, hypertension who presents to ED from home with complaints of nausea, vomiting, fever and chills x 2 days. No relief from home Tylenol. Pt also reports chest pain, SOB.  On exam, temp 100.2 F, pt tachypneic, wheezing and decreased breath sounds noted. O2 sat 90 % on RA Motor weakness. Ill appearing .  Labs : WBC 12.59 , procal 0.62 . Na 131  . CT shows RLL pneumonia , small R pleural effusion . ECG- A fib . Pt given 3 L IVF, IV abx in ED   Admitted as Inpatient with sepsis likely 2/2 Pneumonia .Hyponatremia .  Plan- IV ceftriaxone starting tomorrow.IV Azithromycin x 1 then PO Azithromycin . Obtain sputum cx . Mucinex. Spirometry . IV Solumedrol q8h. Monitor fever curve, hemodynamics . BMP in  am .   Anticipated Length of Stay/Certification Statement: Patient will be admitted on an inpatient basis with an anticipated length of stay of greater than 2 midnights secondary to pneumonia.     Rapid response : patient developed increased WOB, SOB w/hypoxia, O2 sat 84 v% on 2 L O2,and diffuse rales/expiratory wheezing in setting of flash pulmonary edema. + JVD .BLE edema .  Pt given IV lasix 80 mg x 1 Pt currently on NRB with plans to switch to MFNC. Hypertensive urgency Give prn Labetalol for SBP >160 . Pt upgraded to level 1 SD.     Critical care : Acute respiratory failure with hypoxia :Will start scheduled Xopenex/HTS to assist w/secretion clearance . Continue mucinex. Consider diagnostic/therapeutic R thoracentesis if needed - at this time, appears minimal . Consider BIPAP, however will hold off for now as patient endorses nausea w/vomiting - will transition from NRB to MFNC and increase to HFNC if needed  . Aggressive pulmonary hygiene to include flutter valve/IS, deep breathing exercises. Wean O2 to keep Os2 sats >88 % .   Flash pulm edema : Obtain TTE. Daily wt, I/O . Monitor response to IV lasix given during rapid  response .  .   Sepsis 2/2 PNA :Continue IV Ceftriaxone and Azithromycin . Trend WBC, temp . Hypertensive urgency at time of rapid response : Continue home amlodipine, Toprol XL, and ACE-I  . PRN IV Labetalol for SBP > 160 . Close BP monitoring.     Date: 6/11    Day 2:     Patient diuresed well with lasix and oxygen weaned down to 6 L midflow. Continue to wean O2 as able . Rhonchi noted on exam . Non productive cough -unable to produce sputum for Cx .Mucinex increased .  Dry mucous membranes . No further diuresis at this  point.    WBC 12.38, procal up to 0.91 today . IV Ceftriaxone, po Azithromycin  . F/U cx .    Na normalized . Echo 6/11- EF 55 % , normal wall motion . Telemetry - A fib . BP improved .Home Amlodipine increased to 5 mg daily today  . Pt downgraded to med surg today .  Procal , BMP, CBC,ionized Ca, mag, phos in am .    ED Treatment-Medication Administration from 06/10/2025 0804 to 06/10/2025 1637         Date/Time Order Dose Route Action     06/10/2025 0854 acetaminophen (Ofirmev) injection 1,000 mg 1,000 mg Intravenous New Bag     06/10/2025 0848 sodium chloride 0.9 % bolus 1,000 mL 1,000 mL Intravenous New Bag     06/10/2025 0958 sodium chloride 0.9 % bolus 1,000 mL 1,000 mL Intravenous New Bag     06/10/2025 1001 sodium chloride 0.9 % bolus 1,000 mL 1,000 mL Intravenous New Bag     06/10/2025 0853 cefepime (MAXIPIME) 2 g/50 mL dextrose IVPB 2,000 mg Intravenous New Bag     06/10/2025 0919 iohexol (OMNIPAQUE) 350 MG/ML injection (MULTI-DOSE) 100 mL 100 mL Intravenous Given     06/10/2025 1515 azithromycin (ZITHROMAX) 500 mg in sodium chloride 0.9 % 250 mL IVPB 500 mg Intravenous New Bag     06/10/2025 1630 furosemide (LASIX) injection 80 mg 80 mg Intravenous Given            Scheduled Medications:  amLODIPine, 2.5 mg, Oral, Once  [START ON 6/12/2025] amLODIPine, 5 mg, Oral, Daily  apixaban, 5 mg, Oral, BID  azithromycin, 500 mg, Oral, Q24H  cefTRIAXone, 1,000 mg, Intravenous, Q24H  famotidine, 20 mg, Oral, BID  flecainide, 50 mg, Oral, BID  guaiFENesin, 1,200 mg, Oral, BID  latanoprost, 1 drop, Both Eyes, HS  lidocaine, 1 patch, Topical, Daily  lisinopril, 10 mg, Oral, Daily  metoprolol succinate, 25 mg, Oral, Daily  multivitamin-minerals, 1 tablet, Oral, Daily  pravastatin, 20 mg, Oral, Daily  tamsulosin, 0.4 mg, Oral, Daily With Dinner    amLODIPine (NORVASC) tablet 2.5 mg  Dose: 2.5 mg  Freq: Once Route: PO  Start: 06/11/25 1545  amLODIPine (NORVASC) tablet 2.5 mg  Dose: 2.5 mg  Freq: Daily Route: PO  Start: 06/11/25 0900 End: 06/11/25 1537      guaiFENesin (MUCINEX) 12 hr tablet 600 mg  Dose: 600 mg  Freq: 2 times daily Route: PO  Start: 06/10/25 1800 End: 06/11/25 1537  levalbuterol (XOPENEX) inhalation solution 1.25 mg  Dose: 1.25 mg  Freq: 3 times daily (RESP) Route:  NEBULIZATION  Start: 06/10/25 2000 End: 06/11/25 1322  sodium chloride 3 % inhalation solution 4 mL  Dose: 4 mL  Freq: 3 times daily (RESP) Route: NEBULIZATION  Start: 06/10/25 2000 End: 06/11/25 1322  calcium gluconate 1 g in sodium chloride 0.9% 50 mL (premix)  Dose: 1 g  Freq: Once Route: IV  Last Dose: 1 g (06/11/25 0526)  Start: 06/11/25 0515 End: 06/11/25 0556  magnesium sulfate 2 g/50 mL IVPB (premix) 2 g  Dose: 2 g  Freq: Once Route: IV  Last Dose: 2 g (06/11/25 0526)  Start: 06/11/25 0515 End: 06/11/25 0626  Continuous IV Infusions:     PRN Meds:  acetaminophen, 650 mg, Oral, Q6H PRN  albuterol, 2.5 mg, Nebulization, Q4H PRN  labetalol, 10 mg, Intravenous, Q4H PRN  ondansetron, 4 mg, Intravenous, Q6H PRN  polyethylene glycol, 17 g, Oral, Daily PRN      ED Triage Vitals [06/10/25 0814]   Temperature Pulse Respirations Blood Pressure SpO2 Pain Score   100.2 °F (37.9 °C) 104 (!) 24 143/64 90 % 6     Weight (last 2 days)       Date/Time Weight    06/11/25 0723 94.8 (209)    06/11/25 0600 95.1 (209.66)    06/10/25 1649 94.6 (208.56)    06/10/25 0814 95.7 (211)            Vital Signs (last 3 days)       Date/Time Temp Pulse Resp BP MAP (mmHg) SpO2 Calculated FIO2 (%) - Nasal Cannula O2 Flow Rate (L/min) Nasal Cannula O2 Flow Rate (L/min) O2 Device Patient Position - Orthostatic VS Dane Coma Scale Score Pain    06/11/25 1400 -- 98 22 144/64 92 94 % -- -- -- -- -- -- --    06/11/25 1309 -- 95 24 153/72 104 96 % 28 -- 2 L/min  Nasal cannula -- -- --    06/11/25 1300 -- 98 20 153/72 104 95 % -- -- -- -- -- -- --    06/11/25 0924 -- -- -- -- -- -- -- -- -- -- -- -- 3    06/11/25 0908 -- -- -- -- -- -- -- -- -- -- -- -- 3    06/11/25 0900 -- 97 24 127/57 82 96 % -- -- -- -- -- -- --    06/11/25 0856 -- -- -- -- -- -- 32 -- 3 L/min  Nasal cannula -- -- --    06/11/25 0800 -- -- -- -- -- -- 40 -- 5 L/min -- -- 15 --    06/11/25 0731 -- -- -- -- -- -- -- -- -- -- -- -- 3    06/11/25 0723 -- 76 -- 128/80 -- 96 % 40 -- 5  L/min  Nasal cannula  -- -- --    06/11/25 0700 98 °F (36.7 °C) 88 25 157/72 104 98 % -- -- -- -- Lying -- --    06/11/25 0424 -- -- -- -- -- -- -- -- -- -- -- -- 6    06/11/25 0400 98.5 °F (36.9 °C) 103 22 157/75 108 95 % 44 -- 6 L/min Mid flow nasal cannula Lying -- No Pain    06/11/25 0202 -- -- -- -- -- -- 44 6 L/min 6 L/min  Mid flow nasal cannula -- -- --    06/11/25 0000 99 °F (37.2 °C) 96 24 145/78 106 97 % 52 -- 8 L/min Mid flow nasal cannula Lying 15 No Pain    06/10/25 2200 -- 85 24 143/67 97 99 % -- -- -- -- -- -- --    06/10/25 2100 -- 90 22 122/58 84 97 % 52 8 L/min 8 L/min Mid flow nasal cannula -- -- --    06/10/25 2000 -- 88 20 136/68 94 96 % 52 -- 8 L/min Mid flow nasal cannula Lying 15 No Pain    06/10/25 1933 99.9 °F (37.7 °C) -- -- -- -- -- -- -- -- -- -- -- --    06/10/25 1832 -- -- -- -- -- 97 % 52 -- 8 L/min  Mid flow nasal cannula -- -- --    06/10/25 1821 -- -- -- -- -- 99 % 60 -- 10 L/min  Mid flow nasal cannula -- -- --    06/10/25 1807 -- -- -- -- -- 100 % 72 -- 13 L/min  Mid flow nasal cannula -- -- --    06/10/25 1649 -- 104 46 150/70 101 97 % -- -- -- Mid flow nasal cannula -- 15 No Pain    06/10/25 1644 99 °F (37.2 °C) 109 51 173/75 107 94 % -- -- -- Mid flow nasal cannula -- -- --    06/10/25 1639 -- -- -- -- -- -- 80 -- 15 L/min Mid flow nasal cannula -- -- --    06/10/25 1617 -- 98 26 163/77 -- 97 % -- -- -- Non-rebreather mask Sitting -- --    06/10/25 1616 -- -- -- -- -- -- -- -- -- -- -- 15 --    06/10/25 1615 -- 96 28 160/74 106 98 % 80 -- 15 L/min Non-rebreather mask -- -- --    06/10/25 1600 -- 100 32 173/80 115 84 % 28 -- 2 L/min Nasal cannula -- -- --    06/10/25 1530 -- 87 24 176/77 110 92 % 28 -- 2 L/min Nasal cannula -- -- --    06/10/25 1415 -- 77 20 110/59 81 95 % 28 -- 2 L/min Nasal cannula -- -- --    06/10/25 1345 -- 76 22 117/58 83 94 % 28 -- 2 L/min Nasal cannula -- -- --    06/10/25 1330 -- 78 22 114/59 80 95 % 28 -- 2 L/min Nasal cannula -- -- --    06/10/25  1315 -- 82 20 108/55 76 93 % 28 -- 2 L/min Nasal cannula -- -- --    06/10/25 1300 -- 81 22 111/58 78 91 % 28 -- 2 L/min Nasal cannula -- -- --    06/10/25 1245 -- 79 22 111/55 77 93 % 28 -- 2 L/min Nasal cannula -- -- --    06/10/25 1215 -- 81 20 122/61 83 93 % 28 -- 2 L/min Nasal cannula -- -- --    06/10/25 1200 -- 81 -- 118/56 80 93 % 28 -- 2 L/min Nasal cannula -- -- --    06/10/25 1145 97.6 °F (36.4 °C) 83 22 121/60 84 94 % 28 -- 2 L/min Nasal cannula -- -- --    06/10/25 1130 -- 84 22 125/63 88 92 % 28 -- 2 L/min Nasal cannula -- -- --    06/10/25 1115 -- 85 22 140/68 97 92 % 28 -- 2 L/min Nasal cannula -- -- --    06/10/25 1100 -- 86 20 137/65 93 93 % 28 -- 2 L/min Nasal cannula -- -- --    06/10/25 1059 -- 87 -- 148/69 99 91 % -- -- -- -- -- -- --    06/10/25 1044 -- 89 -- 147/65 93 90 % -- -- -- -- -- -- --    06/10/25 1029 -- 82 -- 138/66 95 91 % -- -- -- -- -- -- --    06/10/25 1015 -- 82 -- 125/60 -- 92 % -- -- -- -- -- -- --    06/10/25 1014 -- -- -- -- -- -- -- -- -- -- -- 15 --    06/10/25 1010 -- 83 -- 125/60 87 93 % -- -- -- -- -- -- --    06/10/25 1004 -- 83 -- 123/59 85 93 % -- -- -- -- -- -- --    06/10/25 0954 -- 95 -- 137/64 92 90 % -- -- -- -- -- -- --    06/10/25 0901 -- 89 -- 139/67 96 95 % -- -- -- -- -- -- --    06/10/25 0851 -- 94 -- 145/74 102 94 % -- -- -- -- -- -- --    06/10/25 0845 -- 94 -- 152/70 100 93 % -- -- -- -- -- -- --    06/10/25 0814 100.2 °F (37.9 °C) 104 24 143/64 -- 90 % -- -- -- None (Room air) Sitting -- 6              Pertinent Labs/Diagnostic Test Results:   Radiology:  XR chest portable ICU   Final Interpretation by Mery Clark MD (06/11 0807)      Improving right lower lobe pneumonia.            Workstation performed: [id number]         CT chest abdomen pelvis w contrast   Final Interpretation by Saran Kiran MD (06/10 0801)      1. Significant right lower lobe pneumonia and small right pleural effusion. Mediastinal and hilar lymph nodes are likely  reactive. Follow-up noncontrast chest CT is recommended in 3 months.      2. Solid 5 mm left upper lobe pulmonary nodule. The nodule will be reassessed on the aforementioned CT.      3. 1.8 cm right adrenal nodule. Although its imaging features are indeterminate, it does not have suspicious imaging features (heterogeneity, necrosis, irregular margins), therefore this is likely benign, and can be followed by non-contrast abdomen CT or    MRI in 12 months. Please note that for adrenal nodule > 1 cm,  biochemical evaluation is suggested to rule out functioning adenoma, if not already performed. Considerations related to the patient's age and/or comorbidities may be used to alter these    recommendations.      The study was marked in EPIC for immediate notification.      Resident: ZEE MALAVE I, the attending radiologist, have reviewed the images and agree with the final report above.      Workstation performed: [id number]         XR chest 1 view portable   ED Interpretation by Brody Riggs MD (06/10 1058)   Right side Pneumonia       Final Interpretation by Khris Gonzalez MD (06/10 1615)      Right lower lobe pneumonia.            Workstation performed: [id number]           Cardiology:  Echo complete w/ contrast if indicated   Final Result by Lane Menchaca MD (06/11 0832)        Left Ventricle: Left ventricular cavity size is normal. Wall thickness    is normal. The left ventricular ejection fraction is 55%. Systolic    function is normal. Wall motion is normal.     Right Ventricle: Right ventricular cavity size is normal. Systolic    function is normal.     Left Atrium: The atrium is dilated.         ECG 12 lead   Final Result by Lane Menchaca MD (06/11 0836)   Atrial fibrillation   Controlled ventricular response   Nonspecific ST-t wave changes   when compared to  10-Nain-2025 08:24,   No significant change was found      Confirmed by Lane Menchaca ([id number]) on 6/11/2025 8:36:01 AM      ECG 12 lead    Final Result by Lane Menchaca MD (06/10 1111)   Atrial fibrillation   Rightward axis   Intraventricular conduction delay   When compared with ECG of 02-Jan-2023 14:14,   Vent. rate has increased by  35 bpm   Confirmed by Lane Menchaca ([id number]) on 6/10/2025 11:04:09 AM        G  Results from last 7 days   Lab Units 06/10/25  0836   SARS-COV-2  Negative     Results from last 7 days   Lab Units 06/11/25  0426 06/10/25  1623 06/10/25  0836   WBC Thousand/uL 12.38*  --  12.59*   HEMOGLOBIN g/dL 11.5*  --  12.3   I STAT HEMOGLOBIN g/dl  --  12.2  --    HEMATOCRIT % 36.4*  --  38.5   HEMATOCRIT, ISTAT %  --  36*  --    PLATELETS Thousands/uL 198  --  223   TOTAL NEUT ABS Thousands/µL  --   --  10.89*         Results from last 7 days   Lab Units 06/11/25  0426 06/10/25  1729 06/10/25  1623 06/10/25  0836   SODIUM mmol/L 135 137  --  131*   POTASSIUM mmol/L 3.9 4.2  --  4.0   CHLORIDE mmol/L 101 103  --  98   CO2 mmol/L 25 25  --  26   CO2, I-STAT mmol/L  --   --  22  --    ANION GAP mmol/L 9 9  --  7   BUN mg/dL 15 14  --  15   CREATININE mg/dL 1.01 1.05  --  1.05   EGFR ml/min/1.73sq m 71 68  --  68   CALCIUM mg/dL 8.5 8.3*  --  8.7   CALCIUM, IONIZED mmol/L 1.06* 1.01*  --   --    CALCIUM, IONIZED, ISTAT mmol/L  --   --  1.16  --    MAGNESIUM mg/dL 1.8* 1.9  --   --    PHOSPHORUS mg/dL 2.4 3.1  --   --      Results from last 7 days   Lab Units 06/10/25  1729 06/10/25  0836   AST U/L 14 14   ALT U/L 11 10   ALK PHOS U/L 54 55   TOTAL PROTEIN g/dL 6.8 6.8   ALBUMIN g/dL 4.0 4.1   TOTAL BILIRUBIN mg/dL 1.29* 1.59*         Results from last 7 days   Lab Units 06/11/25  0426 06/10/25  1729 06/10/25  0836   GLUCOSE RANDOM mg/dL 126 125 125                      Results from last 7 days   Lab Units 06/10/25  1623   I STAT BASE EXC mmol/L -5*   I STAT O2 SAT % 98*   ISTAT PH ART  7.302*   I STAT ART PCO2 mm HG 42.3   I STAT ART PO2 mm .0   I STAT ART HCO3 mmol/L 20.9*         Results from last 7 days   Lab Units  06/10/25  1729   HS TNI 0HR ng/L 16         Results from last 7 days   Lab Units 06/10/25  0836   PROTIME seconds 26.5*   INR  2.41*   PTT seconds 49*         Results from last 7 days   Lab Units 06/11/25  0426 06/10/25  0836   PROCALCITONIN ng/ml 0.91* 0.62*     Results from last 7 days   Lab Units 06/10/25  0836   LACTIC ACID mmol/L 1.0             Results from last 7 days   Lab Units 06/10/25  1504   BNP pg/mL 409*                   Results from last 7 days   Lab Units 06/10/25  0957   CLARITY UA  Clear   COLOR UA  Yellow   SPEC GRAV UA  1.010   PH UA  6.0   GLUCOSE UA mg/dl Negative   KETONES UA mg/dl 15 (1+)*   BLOOD UA  Negative   PROTEIN UA mg/dl Trace*   NITRITE UA  Negative   BILIRUBIN UA  Negative   UROBILINOGEN UA E.U./dl 0.2   LEUKOCYTES UA  Negative   WBC UA /hpf 0-1   RBC UA /hpf None Seen   BACTERIA UA /hpf Occasional   EPITHELIAL CELLS WET PREP /hpf Occasional     Results from last 7 days   Lab Units 06/10/25  1503 06/10/25  0836   STREP PNEUMONIAE ANTIGEN, URINE  Negative  --    LEGIONELLA URINARY ANTIGEN  Negative  --    INFLUENZA A PCR   --  Negative   INFLUENZA B PCR   --  Negative   RSV PCR   --  Negative                             Results from last 7 days   Lab Units 06/10/25  0836   BLOOD CULTURE  No Growth at 24 hrs.  No Growth at 24 hrs.                   Past Medical History[1]  Present on Admission:   Atrial fibrillation (HCC)   Hyperlipidemia   Sepsis due to pneumonia (HCC)   Pulmonary nodule   Acute respiratory failure with hypoxia (HCC)   Adrenal nodule (HCC)   BPH (benign prostatic hyperplasia)      Admitting Diagnosis: Diarrhea [R19.7]  Pneumonia [J18.9]  Dyspnea [R06.00]  Nausea and vomiting [R11.2]  Hypoxic [R09.02]  Fever [R50.9]  Atrial fib/flutter, transient (HCC) [I48.91, I48.92]  Age/Sex: 74 y.o. male    Network Utilization Review Department  ATTENTION: Please call with any questions or concerns to [phone] and carefully listen to the prompts so that you are directed to  the right person. All voicemails are confidential.   For Discharge needs, contact Care Management DC Support Team at [phone] opt. 2  Send all requests for admission clinical reviews, approved or denied determinations and any other requests to dedicated fax number below belonging to the campus where the patient is receiving treatment. List of dedicated fax numbers for the Facilities:  FACILITY NAME UR FAX NUMBER   ADMISSION DENIALS (Administrative/Medical Necessity) [phone]   DISCHARGE SUPPORT TEAM (NETWORK) [phone]   PARENT CHILD HEALTH (Maternity/NICU/Pediatrics) [phone]   [facility] [phone]   [facility] [phone]   [facility] [phone]   [facility] [phone]   [facility] [phone]   [facility] [phone]   [facility] [phone]   [facility] [phone]   [facility] [phone]   [facility] [phone]   [facility] [phone]   [facility] [phone]              [1]   Past Medical History:  Diagnosis Date    Arthritis     Atrial fibrillation (HCC)     Cataract     Glaucoma     Right eye    Hypertension

## 2025-06-11 NOTE — INCIDENTAL FINDINGS
The following findings require follow up:  Radiographic finding   Findin.8 cm right adrenal nodule. Although its imaging features are indeterminate, it does not have suspicious imaging features (heterogeneity, necrosis, irregular margins), therefore this is likely benign, and can be followed by non-contrast abdomen CT or  MRI in 12 months. Please note that for adrenal nodule > 1 cm,  biochemical evaluation is suggested to rule out functioning adenoma, if not already performed. Considerations related to the patient's age and/or comorbidities may be used to alter these   recommendations.   Follow up required: YES   Follow up imaging should be done within 12 months.     Please notify the following clinician to assist with the follow up:   Dr. Holliday (PCP)    Incidental finding results were discussed with the Patient by RILEY Barber on 25.   They expressed understanding and all questions answered.

## 2025-06-11 NOTE — ASSESSMENT & PLAN NOTE
Admitted earlier today under SLIM for sepsis 2/2 RLL pneumonia w/only 2L NC requirement - rapid response called this afternoon for increased WOB/hypoxia requiring escalation to MFNC  Suspect this was initially in setting of RLL pneumonia w/small R pleural effusion (likely parapneumonic), however suspect escalation in O2 related to flash pulmonary edema   Imaging revealed significant right lower lobe pneumonia and small right pleural effusion w/mediastinal and hilar lymph nodes (likely reactive)  COVID/FLU/RSV negative  Urine strep/legionella pending  Obtain sputum culture if able  Hold further volume resuscitation - is now s/p IV Lasix 80 mg x1  Will start scheduled Xopenex/HTS to assist w/secretion clearance  Continue Mucinex  Consider diagnostic/therapeutic R thoracentesis if needed - at this time, appears minimal   Continue Midflow   Aggressive pulmonary hygiene to include flutter valve/IS, deep breathing exercises, etc.  Wean O2 for SpO2 > 88%

## 2025-06-11 NOTE — PROGRESS NOTES
Progress Note - Critical Care/ICU   Name: Ramon Keith 74 y.o. male I MRN: [id number]  Unit/Bed#: ICU 03-01 I Date of Admission: 6/10/2025   Date of Service: 6/11/2025 I Hospital Day: 1       Critical Care Interval Transfer Note:    Brief Hospital Summary: Ramon Keith is a 74 y.o. male w/PMHx of HTN, HLD, AF on Flecainide s/p cardioversion in 2018-AC w/Eliquis, BPH who was initially admitted earlier today for sepsis in setting of RLL pneumonia. Patient was started on broad-spectrum antibiotics and received 3L crystalloid. Yesterday afternoon, a rapid response was called as patient developed increased WOB, SOB w/hypoxia, and diffuse rales/expiratory wheezing in setting of flash pulmonary edema. He diuresed w/IV Lasix 80 mg x1 and O2 has been able to be weaned.     Barriers to discharge:   No need for further diuresis at this point - TTE w/normal heart function  Continue Azithromycin/Ceftriaxone D#2 for CAP  Home Amlodipine increased to 5 mg daily   PT/OT      Consults: IP CONSULT TO CASE MANAGEMENT    Recommended to review admission imaging for incidental findings and document in discharge navigator: Incidental findings have been documented in discharge navigator. Patient and/or family was informed and they expressed understanding.      Discharge Plan: Anticipate discharge in 24-48 hrs to discharge location to be determined pending rehab evaluations.       Patient seen and evaluated by Critical Care today and deemed to be appropriate for transfer to [facility] with Telemetry. Spoke to Dr. Dietz from [facility] to accept transfer. Critical care can be contacted via SecureChat with any questions or concerns. Please use the Critical Care AP Role in Secure Chat for any provider inquires until the patient is transferred out of the ICU or until tomorrow at 0600.

## 2025-06-11 NOTE — INCIDENTAL FINDINGS
The following findings require follow up:  Radiographic finding   Finding:   Solid 5 mm left upper lobe pulmonary nodule. Recommend follow-up noncontrast chest CT in 3 months   Follow up required: YES   Follow up imaging should be done within 3 months.     Please notify the following clinician to assist with the follow up:   Dr. Holliday (PCP)    Incidental finding results were discussed with the Patient by RILEY Barber on 06/11/25.   They expressed understanding and all questions answered.

## 2025-06-11 NOTE — QUICK NOTE
Patient updated at bedside regarding plan of care and overall clinical status. All questions/concerns were answered and addressed.

## 2025-06-11 NOTE — CASE MANAGEMENT
Case Management Assessment & Discharge Planning Note    Patient name Ramon Keith  Location ICU 03/ICU - MRN [id number]  : 1950 Date 2025       Current Admission Date: 6/10/2025  Current Admission Diagnosis:Acute respiratory failure with hypoxia (HCC)   Patient Active Problem List    Diagnosis Date Noted    Sepsis due to pneumonia (HCC) 06/10/2025    Hyponatremia 06/10/2025    Pulmonary nodule 06/10/2025    Acute respiratory failure with hypoxia (HCC) 06/10/2025    Hypertensive urgency 06/10/2025    Flash pulmonary edema (HCC) 06/10/2025    Adrenal nodule (HCC) 06/10/2025    BPH (benign prostatic hyperplasia) 06/10/2025    Hypertension 2023    Hyperlipidemia 2023    Preoperative clearance 2023    Crush injury 2023    Crush injury of hand 2023    GEE (acute kidney injury) (HCC) 2023    Constipation 2023    Atrial fibrillation (HCC) 2018      LOS (days): 1  Geometric Mean LOS (GMLOS) (days): 4.9  Days to GMLOS:3.8     OBJECTIVE:    Risk of Unplanned Readmission Score: 15.27     Current admission status: Inpatient    Preferred Pharmacy:   [id number] [address] [address]  Phone: [phone] Fax: [phone]    Primary Care Provider: Jamel Holliday MD    Primary Insurance: [facility]  Secondary Insurance:     ASSESSMENT:  Active Health Care Proxies    There are no active Health Care Proxies on file.       Advance Directives  Does patient have a Health Care POA?: No  Was patient offered paperwork?: Yes (Paperwork provided)  Does patient currently have a Health Care decision maker?: Yes, please see Health Care Proxy section  Does patient have Advance Directives?: No  Was patient offered paperwork?: Yes (Paperwork provided)  Primary Contact: Teresa Ordaz sister    Readmission Root Cause  30 Day Readmission: No    Patient Information  Admitted from:: Home  Mental Status:  Alert  During Assessment patient was accompanied by: Not accompanied during assessment  Assessment information provided by:: Patient  Primary Caregiver: Self  Caregiver's Name:: Teresa Ordaz sister  Caregiver's Relationship to Patient:: Family Member  Caregiver's Telephone Number:: [phone]  Support Systems: Family members  County of Residence: [place]  What city do you live in?: [place]  Home entry access options. Select all that apply.: Stairs  Number of steps to enter home.: 1  Do the steps have railings?: No  Type of Current Residence: [facility]  Living Arrangements: Lives w/ Spouse/significant other  Is patient a ?: No    Activities of Daily Living Prior to Admission  Functional Status: Independent  Completes ADLs independently?: Yes  Ambulates independently?: Yes  Does patient use assisted devices?: No  Does patient currently own DME?: Yes  What DME does the patient currently own?: Walker, Straight Cane  Does patient have a history of Outpatient Therapy (PT/OT)?: No  Does the patient have a history of Short-Term Rehab?: No  Does patient have a history of HHC?: No  Does patient currently have HHC?: No    Patient Information Continued  Income Source: Pension/nursing home  Does patient have prescription coverage?: Yes ([employer])  Can the patient afford their medications and any related supplies (such as glucometers or test strips)?: Yes  Does patient receive dialysis treatments?: No    Means of Transportation  Means of Transport to Appts:: Drives Self  DISCHARGE DETAILS:    Discharge planning discussed with:: Pt     Contacts  Patient Contacts: Teresa Birdjag  Relationship to Patient:: Family  Contact Method: Phone  Phone Number: [phone]  Evaluated the pt at the bedside.  Pt states he lives in a mobile home with his SO Roya.  Pt was IPA and drives.  Does not wear oxygen at baseline.  Sister will transport home.

## 2025-06-11 NOTE — PLAN OF CARE
Problem: PHYSICAL THERAPY ADULT  Goal: Performs mobility at highest level of function for planned discharge setting.  See evaluation for individualized goals.  Description: Treatment/Interventions: Functional transfer training, Therapeutic exercise, Endurance training, Gait training, Bed mobility, Equipment eval/education, Elevations  Equipment Recommended: Walker       See flowsheet documentation for full assessment, interventions and recommendations.  Outcome: Progressing  Note: Prognosis: Fair     Assessment: Pt is 74 y.o. male seen for PT evaluation s/p admit to [facility] on 6/10/2025 w/ Acute respiratory failure with hypoxia (HCC). PT consulted to assess pt's functional mobility and d/c needs. Order placed for PT eval and tx, w/ activity as tolerated order. Pt agreeable to PT  session upon arrival, pt found seated OOB in recliner.  PTA, pt was independent w/ all functional mobility w/ no AD, ambulates community distances and elevations, has 1 CLARITA, lives w/ SO in 1 level home, and retired.  Pt to benefit from continued PT tx to address deficits and maximize level of functional independent mobility and consistency. Upon conclusion pt  seated in recliner. Complexity: Comorbidities affecting pt's physical performance at time of assessment include: a fib, respiratory failure, and sepsis and pneumonia . Personal factors affecting pt at time of IE include: lack of handrail at steps, limited insight into impairments, ambulating with assistive device, and steps to enter home. Please find objective findings from PT assessment regarding body systems outlined above with impairments and limitations including impaired balance, decreased endurance, gait deviations, pain, decreased activity tolerance, decreased functional mobility tolerance, altered sensation, and fall risk.  Pt's clinical presentation is currently unstable/unpredictable seen in pt's presentation of abnormal H&H, abnormal WBC count, abnormal calcium  levels, new onset of O2 use, pain, telemetry use, and step down status. The patient's AM-PAC Basic Mobility Inpatient Short Form Raw Score is 19.  Based on patient presentations and impairments, pt would most appropriately benefit from Level 3 resource intensity upon discharge. A Raw score of greater than 16 suggests the patient may benefit from discharge to home. Please also refer to the recommendation of the Physical Therapist for safe discharge planning. RN verbalized pt appropriate for PT session. Pt seen as a co-eval with OT due to the patient's co-morbidities, clinically unstable presentation, and present impairments which are a regression from the patient's baseline.  Barriers to Discharge: Inaccessible home environment     Rehab Resource Intensity Level, PT: III (Minimum Resource Intensity)    See flowsheet documentation for full assessment.

## 2025-06-11 NOTE — SPEECH THERAPY NOTE
Speech Language/Pathology  Speech/Language Pathology  Assessment    Patient Name: Ramon Keith  Today's Date: 6/11/2025     Problem List  Principal Problem:    Acute respiratory failure with hypoxia (HCC)  Active Problems:    Atrial fibrillation (HCC)    Hyperlipidemia    Sepsis due to pneumonia (HCC)    Pulmonary nodule    Hypertensive urgency    Flash pulmonary edema (HCC)    Adrenal nodule (HCC)    BPH (benign prostatic hyperplasia)    Past Medical History  Past Medical History[1]  Past Surgical History  Past Surgical History[2]     Bedside Swallow Evaluation:    Summary:  Pt presented w/ oral and pharyngeal stages of swallow WNL. Positioned upright OOB in recliner and fully alert. Agreeable to trials of soft solids and thin liquids via straw with single/successive sips. Mastication, bolus control, formation, and transfer were WNL. Swallows appeared prompt. No overt s/s of aspiration. Pt denied difficulties with chewing. He did reported difficulty swallowing at times as intermittent reflux. Stated is worse when he eats certain foods. Pt also stated intermittent belching following meals. Denied difficulties with medications. Min intake overall as pt reported did not feel nauseous this am and wants to see if he will feel nauseous at lunch. Spoke to nursing reported no overt difficulties. ST reviewed diet recommendations and safe swallow strategies pt understood.     Recommendations:  Diet:Regular   Liquid:thin   Meds:As tolerated  Supervision:Full   Positioning:Upright  Strategies: slow rate, alternate liquids with solids, swallow prior to additional po   Pt to take PO/Meds only when fully alert and upright.   Oral care  Aspiration precautions  Reflux precautions  Eval only, No f/u tx indicated.     Consider consult w/:  Rehab  GI f/u IP or OP reports reflux and belching     H&P/Admit info/ pertinent provider notes: (PMH noted above)  Ramon Keith is a 74 y.o. male with a PMH of paroxysmal A-fib on Eliquis  "and flecainide, HLD, hypertension who presents with complaints of nausea, vomiting, fever and chills x 2 days.  Patient reported mL of fish on Sunday and subsequently had vomiting and chills.  He tried Tylenol to alleviate symptoms with no relief.  He reported shortness of breath, chest pain and soft formed bowel movements.  No dark stool nor blood in emesis.  CT imaging with finding of significant right lower lobe pneumonia.  Patient will be admitted to medicine service for further management    Special Studies:  XR CHEST PORTABLE ICU 06/10/2025  IMPRESSION:  Improving right lower lobe pneumonia.  CT CHEST, ABDOMEN AND PELVIS WITH IV CONTRAST 06/10/2025  IMPRESSION:  1. Significant right lower lobe pneumonia and small right pleural effusion. Mediastinal and hilar lymph nodes are likely reactive. Follow-up noncontrast chest CT is recommended in 3 months.  2. Solid 5 mm left upper lobe pulmonary nodule. The nodule will be reassessed on the aforementioned CT.  3. 1.8 cm right adrenal nodule. Although its imaging features are indeterminate, it does not have suspicious imaging features (heterogeneity, necrosis, irregular margins), therefore this is likely benign, and can be followed by non-contrast abdomen CT or   MRI in 12 months. Please note that for adrenal nodule > 1 cm,  biochemical evaluation is suggested to rule out functioning adenoma, if not already performed. Considerations related to the patient's age and/or comorbidities may be used to alter these   recommendations.    Procalcitonin: 0.91             06/11/2025   WBC: 12.38           06/11/2025     Code Status : level 1 full code     Previous MBS: none    Patient's goal: \" nice meeting you\"    Did the pt report pain? no  If yes, was nursing notified/was it addressed? N/a    Reason for consult:  R/o aspiration  Determine safest and least restrictive diet  respiratory compromise  poor intake    Precautions:  Contact    Food Allergies: No known    Current Diet: " Regular and thin   Premorbid diet: Regular and thin   O2 requirement: NC 3 liters    Social/Prior living Lives with family   Voice/Speech: WNL   Follows commands: WNL   Cognitive status: WNL     Oral [facility] exam:  Dentition:Adequate   Lips (VII):WNL  Tongue (XII):WNL  Mandible (V):WNL  Face/oral sensation (V):WNL  Velum (X):WNL    Items administered:  Soft solids and thin liquids via straw     Oral stage:  Lip closure:WNL  Mastication:WNL  Bolus formation:WNL  Bolus control:WNL  Transfer:WNL    Pharyngeal stage:  Swallow promptness: prompt   Laryngeal rise:adequate   No overt s/s aspiration    Esophageal stage:  No s/s reported    Results d/w:  Pt, nursing,   Time In:10:21  Time Out: 10:31                  [1]   Past Medical History:  Diagnosis Date    Arthritis     Atrial fibrillation (HCC)     Cataract     Glaucoma     Right eye    Hypertension    [2]   Past Surgical History:  Procedure Laterality Date    CAST APPLICATION Left 1/3/2023    Procedure: Application short arm splint;  Surgeon: Sim Portillo MD;  [facility];  Service: Orthopedics    CHEST SURGERY      Unsure of surgery, pt had a trauma and had chest surgery anterior and L posterior    FEMUR FRACTURE SURGERY Left     HAND DEBRIDEMENT Left 1/3/2023    Procedure: Irrigation and debridement of the left hand open index and long finger metacarpal fractures.  (Skin, subcutaneous tissue, muscle, fascia, and bone).  Irrigation and debridement skin, subcutaneous tissue, fascia and tendon left hand ring finger proximal phalanx wound.;  Surgeon: Sim Portillo MD;  [facility];  Service: Orthopedics    HAND FRACTURE REPAIR Left 1/3/2023    Procedure: Open reduction and internal fixation left hand index and long finger metacarpal fractures.  Open reduction and internal fixation left hand long finger proximal phalanx fracture;  Surgeon: Sim Portillo MD;  [facility];  Service: Orthopedics    HIP FRACTURE SURGERY Left     KNEE  SURGERY Left

## 2025-06-11 NOTE — OCCUPATIONAL THERAPY NOTE
Occupational Therapy Evaluation     Patient Name: Ramon Keith  Today's Date: 6/11/2025  Problem List  Principal Problem:    Acute respiratory failure with hypoxia (HCC)  Active Problems:    Atrial fibrillation (HCC)    Hyperlipidemia    Sepsis due to pneumonia (HCC)    Pulmonary nodule    Hypertensive urgency    Flash pulmonary edema (HCC)    Adrenal nodule (HCC)    BPH (benign prostatic hyperplasia)    Past Medical History  Past Medical History[1]  Past Surgical History  Past Surgical History[2]        06/11/25 0908   OT Last Visit   OT Visit Date 06/11/25   Note Type   Note type Evaluation   Additional Comments Pt seen as a co-eval with PT due to the patient's co-morbidities, clinically unstable presentation, and present impairments which are a regression from the patient's baseline.   Pain Assessment   Pain Assessment Tool 0-10   Pain Score 3   Pain Location/Orientation Location: Abdomen   Pain Radiating Towards n/a   Pain Onset/Description Descriptor: Sharp   Effect of Pain on Daily Activities n/a   Patient's Stated Pain Goal No pain   Hospital Pain Intervention(s) Repositioned   Restrictions/Precautions   Weight Bearing Precautions Per Order No   Other Precautions Fall Risk;Pain;Visual impairment;Chair Alarm;Multiple lines;Telemetry;O2;Contact/isolation  (3L/min, does not wear at baseline)   Home Living   Type of Home Mobile home   Home Layout One level;Stairs to enter without rails  (1 CLARITA)   Bathroom Shower/Tub Tub/shower unit   Bathroom Toilet Standard   Bathroom Equipment Grab bars in shower;Shower chair;Toilet raiser;Grab bars around toilet   Bathroom Accessibility Accessible   Home Equipment Walker;Wheelchair-manual;Cane  (Pt denies use of AD at baseline but reports availability)   Prior Function   Level of [place] Independent with ADLs;Independent with functional mobility;Independent with IADLS   Lives With Significant other   Receives Help From Family   IADLs Family/Friend/Other provides  "transportation;Independent with medication management;Independent with meal prep   Falls in the last 6 months 0  (pt denies)   Vocational Retired   Lifestyle   Autonomy Pt resides in one level mobile home w/ significant other; I with ADLs, mobility and IADLs with some assistance; -    Reciprocal Relationships supportive family   Service to Others retired   Subjective   Subjective \"It feels okay to get up and move around\"   ADL   Where Assessed Chair   Eating Assistance 6  Modified independent   Grooming Assistance 6  Modified Independent   UB Bathing Assistance 5  Supervision/Setup   LB Bathing Assistance 4  Minimal Assistance   UB Dressing Assistance 5  Supervision/Setup   LB Dressing Assistance 4  Minimal Assistance   Additional Comments Given functional performance skills + medical complexity, therapist suspects via clinical judgement + skilled analysis; pt currently requires stated assist above to perform each area of ADLs d/t limitations including: functional mobility, functional activity tolerance, functional transfers, coordination, sitting/standing balance, pain and overall problem-solving   Bed Mobility   Additional Comments DNT; pt seated in recliner upon arrival and conclusion of session   Transfers   Sit to Stand 4  Minimal assistance   Additional items Assist x 1;Increased time required;Verbal cues   Stand to Sit 4  Minimal assistance   Additional items Assist x 1;Armrests;Increased time required;Verbal cues   Additional Comments RW used; VC for safe hand placement, proper body mechanics and overall RW management during directional turns   Functional Mobility   Functional Mobility 4  Minimal assistance   Additional Comments Pt completed short distance ADL mobility around room and hallway at min A x1 w/ RW. No significant LOB observed, mild to moderate instability  (SpO2 on 5L/min during mobility 87% - increased to >90% with standing rest break)   Additional items Rolling walker   Balance   Static " Sitting Good   Dynamic Sitting Fair +   Static Standing Fair   Dynamic Standing Fair -   Ambulatory Fair -   Activity Tolerance   Activity Tolerance Patient limited by fatigue;Patient limited by pain   Medical Staff Made Aware Yes, CM made aware of d/c recs   Nurse Made Aware Yes, nursing staff made aware of session outcomes   RUE Assessment   RUE Assessment WFL   RUE Strength   RUE Overall Strength   (3+/5)   LUE Assessment   LUE Assessment WFL   LUE Strength   LUE Overall Strength   (3+/5)   Hand Function   Gross Motor Coordination Functional   Fine Motor Coordination Functional   Vision-Basic Assessment   Current Vision Does not wear glasses  (decreased R eye vision)   Psychosocial   Psychosocial (WDL) WDL   Cognition   Overall Cognitive Status WFL   Arousal/Participation Alert;Responsive;Cooperative   Attention Within functional limits   Orientation Level Oriented X4   Memory Within functional limits   Following Commands Follows all commands and directions without difficulty   Comments Pt agreeable to OT evaluation, pleasant   Assessment   Limitation Decreased ADL status;Decreased UE strength;Decreased Safe judgement during ADL;Decreased endurance;Decreased self-care trans;Decreased high-level ADLs   Prognosis Good   Assessment Pt is a 74 y.o. male seen for OT evaluation s/p admit to [facility] on 6/10/2025 w/ Acute respiratory failure with hypoxia (HCC).  Comorbidities affecting pt's functional performance at time of assessment include: a-fib, hyperlipidemia, pulmonary nodule, edema, BPH, adrenal nodule, HTN, GEE. Personal factors affecting pt at time of IE include:steps to enter environment, difficulty performing ADLS, difficulty performing IADLS , decreased initiation and engagement , health management , and environment. OT order placed to assess Pt's ADLs, cognitive status, and performance during functional tasks in order to maximize safety and independence while making most appropriate d/c recommendations.  Prior to admission, pt was I with ADLs, mobility and I with ADLs. Upon evaluation: the following deficits impact occupational performance: weakness, decreased strength, decreased balance, decreased tolerance, impaired problem solving, decreased safety awareness, and increased pain. Pt's clinical presentation is currently stable  given new onset deficits that effect Pt's occupational performance and ability to safely return to PLOF including decrease activity tolerance, decrease standing balance, decrease sitting balance, decrease performance during ADL tasks, decrease safety awareness , decrease generalized strength, decrease activity engagement, decrease performance during functional transfers, and high fall risk combined with medical complications of hypertension , poor blood pressure control, pain impacting overall mobility status, A-fib, abnormal CBC, decreased skin integrity, multiple readmissions, and need for input for mobility technique/safety.  Pt to benefit from continued skilled OT tx while in the hospital to address deficits as defined above and maximize level of functional independence w ADL's and functional mobility. Occupational Performance areas to address include: grooming, bathing/shower, toilet hygiene, dressing, functional mobility, community mobility, and clothing management. From OT standpoint, recommendation at time of d/c would with minimal intensity OT resources.   Goals   Patient Goals to go home soon   Plan   Treatment Interventions ADL retraining;Functional transfer training;UE strengthening/ROM;Endurance training;Patient/family training;Compensatory technique education;Energy conservation;Activityengagement   Goal Expiration Date 06/21/25   OT Treatment Day 0   OT Frequency 2-3x/wk   Discharge Recommendation   Rehab Resource Intensity Level, OT III (Minimum Resource Intensity)   Additional Comments  The patient's raw score on the AM-PAC Daily Activity inpatient short form is 18,  standardized score is 38.66, less than 39.4. Patients at this level are likely to benefit from discharge with minimal intensity OT resources. Please refer to the recommendation of the Occupational Therapist for safe discharge planning.   AM-PAC Daily Activity Inpatient   Lower Body Dressing 2   Bathing 2   Toileting 3   Upper Body Dressing 3   Grooming 4   Eating 4   Daily Activity Raw Score 18   Daily Activity Standardized Score (Calc for Raw Score >=11) 38.66   AM-PAC Applied Cognition Inpatient   Following a Speech/Presentation 3   Understanding Ordinary Conversation 4   Taking Medications 4   Remembering Where Things Are Placed or Put Away 3   Remembering List of 4-5 Errands 3   Taking Care of Complicated Tasks 3   Applied Cognition Raw Score 20   Applied Cognition Standardized Score 41.76     GOALS    Pt will achieve the following within specified time frame: LTG  Pt will achieve the following goals within 10 days    *ADL transfers with (I) for inc'd independence with ADLs/purposeful tasks    *UB ADL with (I) for inc'd independence with self cares    *LB ADL with (I) using AE prn for inc'd independence with self cares    *Toileting with (I) for clothing management and hygiene for return to [facility] with personal care    *Increase static stand balance and dyn stand balance to G- for inc'd safety with standing purposeful tasks    *Increase stand tolerance x7 m for inc'd tolerance with standing purposeful tasks    *Bed mobility- (I) for inc'd independence to manage own comfort and initiate EOB & OOB purposeful tasks    Participate in 10-15m UE therex to increase overall stamina/activity tolerance for purposeful tasks      Sadie Cordova, MS, OTR/L              [1]   Past Medical History:  Diagnosis Date    Arthritis     Atrial fibrillation (HCC)     Cataract     Glaucoma     Right eye    Hypertension    [2]   Past Surgical History:  Procedure Laterality Date    CAST APPLICATION Left 1/3/2023    Procedure: Application  short arm splint;  Surgeon: Sim Portillo MD;  [facility];  Service: Orthopedics    CHEST SURGERY      Unsure of surgery, pt had a trauma and had chest surgery anterior and L posterior    FEMUR FRACTURE SURGERY Left     HAND DEBRIDEMENT Left 1/3/2023    Procedure: Irrigation and debridement of the left hand open index and long finger metacarpal fractures.  (Skin, subcutaneous tissue, muscle, fascia, and bone).  Irrigation and debridement skin, subcutaneous tissue, fascia and tendon left hand ring finger proximal phalanx wound.;  Surgeon: Sim Portillo MD;  [facility];  Service: Orthopedics    HAND FRACTURE REPAIR Left 1/3/2023    Procedure: Open reduction and internal fixation left hand index and long finger metacarpal fractures.  Open reduction and internal fixation left hand long finger proximal phalanx fracture;  Surgeon: Sim Portillo MD;  [facility];  Service: Orthopedics    HIP FRACTURE SURGERY Left     KNEE SURGERY Left

## 2025-06-12 LAB
ANION GAP SERPL CALCULATED.3IONS-SCNC: 8 MMOL/L (ref 4–13)
ATRIAL RATE: 174 BPM
BUN SERPL-MCNC: 15 MG/DL (ref 5–25)
CA-I BLD-SCNC: 1.08 MMOL/L (ref 1.12–1.32)
CALCIUM SERPL-MCNC: 9 MG/DL (ref 8.4–10.2)
CHLORIDE SERPL-SCNC: 100 MMOL/L (ref 96–108)
CO2 SERPL-SCNC: 28 MMOL/L (ref 21–32)
CREAT SERPL-MCNC: 0.82 MG/DL (ref 0.6–1.3)
ERYTHROCYTE [DISTWIDTH] IN BLOOD BY AUTOMATED COUNT: 13.4 % (ref 11.6–15.1)
GFR SERPL CREATININE-BSD FRML MDRD: 85 ML/MIN/1.73SQ M
GLUCOSE SERPL-MCNC: 131 MG/DL (ref 65–140)
HCT VFR BLD AUTO: 37.6 % (ref 36.5–49.3)
HGB BLD-MCNC: 11.9 G/DL (ref 12–17)
MAGNESIUM SERPL-MCNC: 2.3 MG/DL (ref 1.9–2.7)
MCH RBC QN AUTO: 30.5 PG (ref 26.8–34.3)
MCHC RBC AUTO-ENTMCNC: 31.6 G/DL (ref 31.4–37.4)
MCV RBC AUTO: 96 FL (ref 82–98)
PHOSPHATE SERPL-MCNC: 3.3 MG/DL (ref 2.3–4.1)
PLATELET # BLD AUTO: 229 THOUSANDS/UL (ref 149–390)
PMV BLD AUTO: 9.6 FL (ref 8.9–12.7)
POTASSIUM SERPL-SCNC: 4.3 MMOL/L (ref 3.5–5.3)
PROCALCITONIN SERPL-MCNC: 0.95 NG/ML
QRS AXIS: -88 DEGREES
QRSD INTERVAL: 56 MS
QT INTERVAL: 204 MS
QTC INTERVAL: 346 MS
RBC # BLD AUTO: 3.9 MILLION/UL (ref 3.88–5.62)
SODIUM SERPL-SCNC: 136 MMOL/L (ref 135–147)
T WAVE AXIS: 233 DEGREES
VENTRICULAR RATE: 173 BPM
WBC # BLD AUTO: 12.37 THOUSAND/UL (ref 4.31–10.16)

## 2025-06-12 PROCEDURE — 80048 BASIC METABOLIC PNL TOTAL CA: CPT | Performed by: NURSE PRACTITIONER

## 2025-06-12 PROCEDURE — 85027 COMPLETE CBC AUTOMATED: CPT | Performed by: NURSE PRACTITIONER

## 2025-06-12 PROCEDURE — 94664 DEMO&/EVAL PT USE INHALER: CPT

## 2025-06-12 PROCEDURE — 84145 PROCALCITONIN (PCT): CPT | Performed by: NURSE PRACTITIONER

## 2025-06-12 PROCEDURE — 97110 THERAPEUTIC EXERCISES: CPT

## 2025-06-12 PROCEDURE — 83735 ASSAY OF MAGNESIUM: CPT | Performed by: NURSE PRACTITIONER

## 2025-06-12 PROCEDURE — 93005 ELECTROCARDIOGRAM TRACING: CPT

## 2025-06-12 PROCEDURE — 84100 ASSAY OF PHOSPHORUS: CPT | Performed by: NURSE PRACTITIONER

## 2025-06-12 PROCEDURE — 97116 GAIT TRAINING THERAPY: CPT

## 2025-06-12 PROCEDURE — 94760 N-INVAS EAR/PLS OXIMETRY 1: CPT

## 2025-06-12 PROCEDURE — 94668 MNPJ CHEST WALL SBSQ: CPT

## 2025-06-12 PROCEDURE — 93010 ELECTROCARDIOGRAM REPORT: CPT | Performed by: INTERNAL MEDICINE

## 2025-06-12 PROCEDURE — 99232 SBSQ HOSP IP/OBS MODERATE 35: CPT

## 2025-06-12 PROCEDURE — 82330 ASSAY OF CALCIUM: CPT | Performed by: NURSE PRACTITIONER

## 2025-06-12 RX ADMIN — FLECAINIDE ACETATE 50 MG: 50 TABLET ORAL at 17:17

## 2025-06-12 RX ADMIN — METOPROLOL SUCCINATE 25 MG: 25 TABLET, EXTENDED RELEASE ORAL at 08:09

## 2025-06-12 RX ADMIN — LIDOCAINE 1 PATCH: 700 PATCH TOPICAL at 08:08

## 2025-06-12 RX ADMIN — CEFTRIAXONE SODIUM 1000 MG: 10 INJECTION, POWDER, FOR SOLUTION INTRAVENOUS at 10:00

## 2025-06-12 RX ADMIN — GUAIFENESIN 1200 MG: 600 TABLET ORAL at 17:18

## 2025-06-12 RX ADMIN — APIXABAN 5 MG: 5 TABLET, FILM COATED ORAL at 08:08

## 2025-06-12 RX ADMIN — LISINOPRIL 10 MG: 10 TABLET ORAL at 08:08

## 2025-06-12 RX ADMIN — ACETAMINOPHEN 650 MG: 325 TABLET ORAL at 00:01

## 2025-06-12 RX ADMIN — LATANOPROST 1 DROP: 50 SOLUTION OPHTHALMIC at 21:09

## 2025-06-12 RX ADMIN — FAMOTIDINE 20 MG: 20 TABLET, FILM COATED ORAL at 08:08

## 2025-06-12 RX ADMIN — FAMOTIDINE 20 MG: 20 TABLET, FILM COATED ORAL at 17:17

## 2025-06-12 RX ADMIN — GUAIFENESIN 1200 MG: 600 TABLET ORAL at 08:09

## 2025-06-12 RX ADMIN — AZITHROMYCIN DIHYDRATE 500 MG: 250 TABLET ORAL at 08:08

## 2025-06-12 RX ADMIN — AMLODIPINE BESYLATE 5 MG: 5 TABLET ORAL at 08:09

## 2025-06-12 RX ADMIN — FLECAINIDE ACETATE 50 MG: 50 TABLET ORAL at 08:08

## 2025-06-12 RX ADMIN — APIXABAN 5 MG: 5 TABLET, FILM COATED ORAL at 17:18

## 2025-06-12 RX ADMIN — PRAVASTATIN SODIUM 20 MG: 20 TABLET ORAL at 08:08

## 2025-06-12 RX ADMIN — Medication 1 TABLET: at 08:09

## 2025-06-12 RX ADMIN — TAMSULOSIN HYDROCHLORIDE 0.4 MG: 0.4 CAPSULE ORAL at 17:17

## 2025-06-12 RX ADMIN — ACETAMINOPHEN 650 MG: 325 TABLET ORAL at 17:21

## 2025-06-12 NOTE — ASSESSMENT & PLAN NOTE
Is s/p successful cardioversion in past, however currently in AF (rate-controlled)  Continue home AC w/Eliquis  Continue home metoprolol for rate control  Patient had an incident of elevated heart rate at 0509 this a.m.-EKG reveals atrial tachycardia with rapid ventricular response heart rate 173.  Spoke to nursing staff patient did not have any chest pain at the time.  He was receiving a.m. care at the time likely related to exertion.  In any event we will continue to monitor on telemetry overnight tonight

## 2025-06-12 NOTE — ASSESSMENT & PLAN NOTE
Rapid response called afternoon of 6/10 for escalating O2 requirements after receiving volume resuscitation - noted to have increased WOB/hypoxia/diffuse rales w/expiratory wheezing-patient was transferred to ICU as a level 1 stepdown  See also plan for acute respiratory failure with hypoxia  6/11 TTE LVEF 55% with normal systolic function and normal wall motion.  Right ventricular normal systolic function  Patient is not on diuretics in the outpatient setting  Is s/p IV Lasix 80 mg x1  Continue to monitor fluid status closely  Daily weight/I & O  Patient is -1.4 L to date, weight is down 4 pounds from yesterday, euvolemic on exam  Patient is not receiving diuretics currently  Patient is on 2 L nasal cannula oxygen as of today.  Patient is stable and slim resumed service today

## 2025-06-12 NOTE — ASSESSMENT & PLAN NOTE
Present on arrival to ED   AEB tachycardia, tachypnea, leukocytosis  In setting of RLL pneumonia   COVID/FLU/RSV negative   Blood cultures for 24 hours  UA w/out evidence of infection  Urine strep/legionella negative  Obtain sputum culture to be obtained of yet  LA WNL  Procalcitonin 0.62  Is s/p IV fluid resuscitation  Continue IV Ceftriaxone and Azithromycin for CAP, planning to further de-escalate tomorrow  Patient is afebrile, mild leukocytosis today, some tachypnea with exertion, tachycardia resolved  CBC a.m.

## 2025-06-12 NOTE — PROGRESS NOTES
Progress Note - Hospitalist   Name: Ramon Keith 74 y.o. male I MRN: [id number]  Unit/Bed#: ICU 03-01 I Date of Admission: 6/10/2025   Date of Service: 6/12/2025 I Hospital Day: 2    Assessment & Plan  Sepsis due to pneumonia (HCC)  Present on arrival to ED   AEB tachycardia, tachypnea, leukocytosis  In setting of RLL pneumonia   COVID/FLU/RSV negative   Blood cultures for 24 hours  UA w/out evidence of infection  Urine strep/legionella negative  Obtain sputum culture to be obtained of yet  LA WNL  Procalcitonin 0.62  Is s/p IV fluid resuscitation  Continue IV Ceftriaxone and Azithromycin for CAP, planning to further de-escalate tomorrow  Patient is afebrile, mild leukocytosis today, some tachypnea with exertion, tachycardia resolved  CBC a.m.  Atrial fibrillation (HCC)  Is s/p successful cardioversion in past, however currently in AF (rate-controlled)  Continue home AC w/Eliquis  Continue home metoprolol for rate control  Patient had an incident of elevated heart rate at 0509 this a.m.-EKG reveals atrial tachycardia with rapid ventricular response heart rate 173.  Spoke to nursing staff patient did not have any chest pain at the time.  He was receiving a.m. care at the time likely related to exertion.  In any event we will continue to monitor on telemetry overnight tonight  Hyperlipidemia  Continue statin  Pulmonary nodule  Imaging revealed solid 5 mm left upper lobe pulmonary nodule  Will require follow-up w/repeat imaging as OP  Acute respiratory failure with hypoxia (HCC)  Admitted 6/10 under [facility] for sepsis 2/2 RLL pneumonia w/only 2L NC requirement - rapid response called same afternoon for increased WOB/hypoxia requiring escalation to [facility].  Patient was transferred to stepdown level 1 ICU transfer  Suspect this was initially in setting of RLL pneumonia w/small R pleural effusion (likely parapneumonic), however suspect escalation in O2 related to flash pulmonary edema(see plan for same)  See also plan for  sepsis  6/12 patient stabilized and slim resumed care of patient  On 2 L nasal cannula oxygen as of this morning  Encourage mobility, I-S, out of bed to chair  Respiratory protocol continue to wean oxygen as able  Hypertensive urgency  Noted to be hypertensive at time of rapid response  Has underlying HTN and received 3L crystalloid  Is s/p IV Lasix 80 mg x1  BPs are now well-controlled  Continue prehospital amlodipine, lisinopril, metoprolol  Continue as needed labetalol  Monitor BP per protocol  Flash pulmonary edema (HCC)  Rapid response called afternoon of 6/10 for escalating O2 requirements after receiving volume resuscitation - noted to have increased WOB/hypoxia/diffuse rales w/expiratory wheezing-patient was transferred to ICU as a level 1 stepdown  See also plan for acute respiratory failure with hypoxia  6/11 TTE LVEF 55% with normal systolic function and normal wall motion.  Right ventricular normal systolic function  Patient is not on diuretics in the outpatient setting  Is s/p IV Lasix 80 mg x1  Continue to monitor fluid status closely  Daily weight/I & O  Patient is -1.4 L to date, weight is down 4 pounds from yesterday, euvolemic on exam  Patient is not receiving diuretics currently  Patient is on 2 L nasal cannula oxygen as of today.  Patient is stable and slim resumed service today  Adrenal nodule (HCC)  Imaging revealed 1.8 cm right adrenal nodule  Will require follow-up as OP w/either non-contrast abdomen CT or MRI in 12 months  BPH (benign prostatic hyperplasia)  Continue home Flomax  Monitor urinary output    VTE Pharmacologic Prophylaxis: VTE Score: 9 High Risk (Score >/= 5) - Pharmacological DVT Prophylaxis Ordered: apixaban (Eliquis). Sequential Compression Devices Ordered.    Mobility:   Basic Mobility Inpatient Raw Score: 19  JH-HLM Goal: 6: Walk 10 steps or more  JH-HLM Achieved: 6: Walk 10 steps or more  JH-HLM Goal achieved. Continue to encourage appropriate mobility.    Patient Centered  Rounds: I performed bedside rounds with nursing staff today.   Discussions with Specialists or Other Care Team Provider: Nursing, case management    Education and Discussions with Family / Patient: Attempted to update  (sister) via phone. Left voicemail.     Current Length of Stay: 2 day(s)  Current Patient Status: Inpatient   Certification Statement: The patient will continue to require additional inpatient hospital stay due to continued treatment of CAP with IV antibiotics, monitoring fluid status closely, attempting to wean oxygen, repeat labs a.m.  Discharge Plan: Anticipate discharge in 24-48 hrs to home.  Continuing IV antibiotics for pneumonia today.  Patient is down to 2 L of oxygen today.  Encouraged mobility, I-S, and to continue to wean oxygen as able.  Repeat labs in the AM.    Code Status: Level 1 - Full Code    Subjective   Denies dizziness lightheadedness chest pain or palpitations.  Denies pain or discomfort.  Feeling better.    Objective :  Temp:  [96.9 °F (36.1 °C)-99.9 °F (37.7 °C)] 97.8 °F (36.6 °C)  HR:  [] 85  BP: ()/(52-88) 145/71  Resp:  [18-42] 24  SpO2:  [88 %-98 %] 95 %  O2 Device: Nasal cannula  Nasal Cannula O2 Flow Rate (L/min):  [2 L/min] 2 L/min    Body mass index is 28.69 kg/m².     Input and Output Summary (last 24 hours):     Intake/Output Summary (Last 24 hours) at 6/12/2025 1302  Last data filed at 6/12/2025 1224  Gross per 24 hour   Intake 1570 ml   Output 2575 ml   Net -1005 ml       Physical Exam  Vitals reviewed.   Constitutional:       General: He is not in acute distress.     Appearance: He is well-developed.   HENT:      Head: Normocephalic and atraumatic.     Cardiovascular:      Rate and Rhythm: Normal rate. Rhythm irregular.      Heart sounds: No murmur heard.  Pulmonary:      Effort: Pulmonary effort is normal. No respiratory distress.      Breath sounds: No wheezing or rales.      Comments: Nonlabored respirations at rest on O2 2 L nasal  cannula, decreased breath sounds bilateral bases, no cough  Abdominal:      General: There is no distension.      Palpations: Abdomen is soft.      Tenderness: There is no abdominal tenderness. There is no guarding.     Musculoskeletal:         General: No swelling.     Skin:     General: Skin is warm and dry.      Capillary Refill: Capillary refill takes less than 2 seconds.     Neurological:      General: No focal deficit present.      Mental Status: He is alert and oriented to person, place, and time. Mental status is at baseline.     Psychiatric:         Mood and Affect: Mood normal.         Behavior: Behavior normal.         Thought Content: Thought content normal.         Judgment: Judgment normal.           Lines/Drains:        Telemetry:  Telemetry Orders (From admission, onward)               24 Hour Telemetry Monitoring  Continuous x 24 Hours (Telem)        Question:  Reason for 24 Hour Telemetry  Answer:  Decompensated CHF- and any one of the following: continuous diuretic infusion or total diuretic dose >200 mg daily, associated electrolyte derangement (I.e. K < 3.0), inotropic drip (continuous infusion), hx of ventricular arrhythmia, or new EF < 35%                     Telemetry Reviewed: Atrial fibrillation. HR averaging 80s-the episode of RVR with heart rate 170 overnight  Indication for Continued Telemetry Use: Arrthymias requiring medical therapy               Lab Results: I have reviewed the following results:   Results from last 7 days   Lab Units 06/12/25  0505 06/10/25  1623 06/10/25  0836   WBC Thousand/uL 12.37*   < > 12.59*   HEMOGLOBIN g/dL 11.9*   < > 12.3   I STAT HEMOGLOBIN   --    < >  --    HEMATOCRIT % 37.6   < > 38.5   HEMATOCRIT, ISTAT   --    < >  --    PLATELETS Thousands/uL 229   < > 223   SEGS PCT %  --   --  86*   LYMPHO PCT %  --   --  7*   MONO PCT %  --   --  5   EOS PCT %  --   --  1    < > = values in this interval not displayed.     Results from last 7 days   Lab Units  06/12/25  0505 06/11/25  0426 06/10/25  1729   SODIUM mmol/L 136   < > 137   POTASSIUM mmol/L 4.3   < > 4.2   CHLORIDE mmol/L 100   < > 103   CO2 mmol/L 28   < > 25   BUN mg/dL 15   < > 14   CREATININE mg/dL 0.82   < > 1.05   ANION GAP mmol/L 8   < > 9   CALCIUM mg/dL 9.0   < > 8.3*   ALBUMIN g/dL  --   --  4.0   TOTAL BILIRUBIN mg/dL  --   --  1.29*   ALK PHOS U/L  --   --  54   ALT U/L  --   --  11   AST U/L  --   --  14   GLUCOSE RANDOM mg/dL 131   < > 125    < > = values in this interval not displayed.     Results from last 7 days   Lab Units 06/10/25  0836   INR  2.41*         Results from last 7 days   Lab Units 06/11/25  0426   HEMOGLOBIN A1C % 6.0*     Results from last 7 days   Lab Units 06/12/25  0505 06/11/25  0426 06/10/25  0836   LACTIC ACID mmol/L  --   --  1.0   PROCALCITONIN ng/ml 0.95* 0.91* 0.62*       Recent Cultures (last 7 days):   Results from last 7 days   Lab Units 06/10/25  1503 06/10/25  0836   BLOOD CULTURE   --  No Growth at 48 hrs.  No Growth at 48 hrs.   LEGIONELLA URINARY ANTIGEN  Negative  --        Imaging Results Review: I reviewed radiology reports from this admission including: Chest x-ray, CT chest abdomen pelvis, follow-up chest x-ray.  Other Study Results Review: Other studies reviewed include: TTE    Last 24 Hours Medication List:     Current Facility-Administered Medications:     acetaminophen (TYLENOL) tablet 650 mg, Q6H PRN    albuterol inhalation solution 2.5 mg, Q4H PRN    amLODIPine (NORVASC) tablet 5 mg, Daily    apixaban (ELIQUIS) tablet 5 mg, BID    azithromycin (ZITHROMAX) tablet 500 mg, Q24H    ceftriaxone (ROCEPHIN) 1 g/50 mL in dextrose IVPB, Q24H, Last Rate: Stopped (06/12/25 1030)    famotidine (PEPCID) tablet 20 mg, BID    flecainide (TAMBOCOR) tablet 50 mg, BID    guaiFENesin (MUCINEX) 12 hr tablet 1,200 mg, BID    labetalol (NORMODYNE) injection 10 mg, Q4H PRN    latanoprost (XALATAN) 0.005 % ophthalmic solution 1 drop, HS    lidocaine (LIDODERM) 5 % patch 1  patch, Daily    lisinopril (ZESTRIL) tablet 10 mg, Daily    metoprolol succinate (TOPROL-XL) 24 hr tablet 25 mg, Daily    multivitamin-minerals (CENTRUM) tablet 1 tablet, Daily    ondansetron (ZOFRAN) injection 4 mg, Q6H PRN    polyethylene glycol (MIRALAX) packet 17 g, Daily PRN    pravastatin (PRAVACHOL) tablet 20 mg, Daily    tamsulosin (FLOMAX) capsule 0.4 mg, Daily With Dinner    Administrative Statements   Today, Patient Was Seen By: RILEY Cage  I have spent a total time of 40 minutes in caring for this patient on the day of the visit/encounter including Diagnostic results, Patient and family education, Counseling / Coordination of care, Documenting in the medical record, Reviewing/placing orders in the medical record (including tests, medications, and/or procedures), Obtaining or reviewing history  , and Communicating with other healthcare professionals .    **Please Note: This note may have been constructed using a voice recognition system.**

## 2025-06-12 NOTE — UTILIZATION REVIEW
Continued Stay Review    Date: 6/12/2025                          Current Patient Class: Inpatient  Current Level of Care:  Med Surg     HPI:74 y.o. male initially admitted on 6/10/2025   Current Diagnosis: Sepsis due to pneumonia, Atrial fibrillation, Hyperlipidemia. Pulmonary nodule, Hypertensive Urgency      Assessment/Plan:  6/12 Pt continues with treatment of CAP with IV antibiotics, monitoring fluid status closely,attempting to wean oxygen, currently at 2 L NC.    Afib rate averaging 80's, overnight he had an episode of RVR with , He was receiving AM care at the time, likely related to exertion.       Medications:   Scheduled Medications:  amLODIPine, 5 mg, Oral, Daily  apixaban, 5 mg, Oral, BID  azithromycin, 500 mg, Oral, Q24H  cefTRIAXone, 1,000 mg, Intravenous, Q24H  famotidine, 20 mg, Oral, BID  flecainide, 50 mg, Oral, BID  guaiFENesin, 1,200 mg, Oral, BID  latanoprost, 1 drop, Both Eyes, HS  lidocaine, 1 patch, Topical, Daily  lisinopril, 10 mg, Oral, Daily  metoprolol succinate, 25 mg, Oral, Daily  multivitamin-minerals, 1 tablet, Oral, Daily  pravastatin, 20 mg, Oral, Daily  tamsulosin, 0.4 mg, Oral, Daily With Dinner      Continuous IV Infusions:     PRN Meds:  acetaminophen, 650 mg, Oral, Q6H PRN-6/11 x2- 6/12 x1   albuterol, 2.5 mg, Nebulization, Q4H PRN  labetalol, 10 mg, Intravenous, Q4H PRN  ondansetron, 4 mg, Intravenous, Q6H PRN  polyethylene glycol, 17 g, Oral, Daily PRN      Discharge Plan:  TBD     Vital Signs (last 3 days)       Date/Time Temp Pulse Resp BP MAP (mmHg) SpO2 Calculated FIO2 (%) - Nasal Cannula O2 Flow Rate (L/min) Nasal Cannula O2 Flow Rate (L/min) O2 Device Patient Position - Orthostatic VS Dane Coma Scale Score Pain    06/12/25 1200 -- 85 24 145/71 100 95 % -- -- -- -- -- -- --    06/12/25 1142 97.8 °F (36.6 °C) 83 31 136/66 92 95 % -- -- -- Nasal cannula Sitting -- --    06/12/25 0820 -- -- -- -- -- -- -- -- -- -- -- -- 2    06/12/25 0800 -- 86 22 164/77 110 95 %  -- -- -- -- -- 15 --    06/12/25 0759 -- -- -- -- -- 95 % 28 -- 2 L/min Nasal cannula -- -- --    06/12/25 0730 97.2 °F (36.2 °C) 79 25 102/52 74 97 % 28 -- 2 L/min Nasal cannula Lying -- No Pain    06/12/25 0600 -- 78 31 120/63 85 97 % -- -- -- -- -- -- --    06/12/25 0500 96.9 °F (36.1 °C) 84 33 -- -- 98 % -- -- -- -- -- -- --    06/12/25 0400 -- 74 22 97/53 70 98 % -- -- -- -- -- -- --    06/12/25 0300 -- 77 25 -- -- 97 % -- -- -- -- -- -- --    06/12/25 0200 -- 81 27 107/54 78 97 % -- -- -- -- -- -- --    06/12/25 0101 -- 92 32 -- -- 91 % -- -- -- -- -- -- --    06/12/25 0100 -- 91 35 -- -- 92 % -- -- -- -- -- -- --    06/12/25 0001 -- 109 30 189/81 117 88 % -- -- -- -- -- -- 3    06/11/25 2320 -- 114 42 205/88 127 92 % -- -- -- -- -- -- --    06/11/25 2300 -- 112 37 193/88 126 90 % -- -- -- -- -- -- --    06/11/25 2200 -- 105 30 139/78 104 91 % -- -- -- -- -- -- --    06/11/25 2100 -- 87 32 136/61 88 93 % -- -- -- -- -- -- --    06/11/25 2027 -- -- 18 -- -- -- 28 -- 2 L/min Nasal cannula -- -- --    06/11/25 2000 -- 87 33 -- -- 94 % -- -- -- -- -- 15 No Pain    06/11/25 1916 98.1 °F (36.7 °C) -- -- -- -- -- -- -- -- -- -- -- --    06/11/25 1900 -- 83 28 98/57 75 93 % -- -- -- -- -- -- --    06/11/25 1800 -- 93 26 142/66 95 93 % 28 -- 2 L/min Nasal cannula -- -- --    06/11/25 1700 -- 99 26 136/65 92 93 % -- -- -- Nasal cannula -- -- --    06/11/25 1600 99.9 °F (37.7 °C) 97 22 142/77 99 95 % -- -- -- None (Room air) Lying 15 --    06/11/25 1400 -- 98 22 144/64 92 94 % -- -- -- -- -- -- --    06/11/25 1309 -- 95 24 153/72 104 96 % 28 -- 2 L/min  Nasal cannula -- -- --    06/11/25 1300 -- 98 20 153/72 104 95 % -- -- -- -- -- -- --    06/11/25 1200 -- -- -- -- -- -- -- -- -- -- -- 15 --    06/11/25 0924 -- -- -- -- -- -- -- -- -- -- -- -- 3    06/11/25 0908 -- -- -- -- -- -- -- -- -- -- -- -- 3    06/11/25 0900 -- 97 24 127/57 82 96 % -- -- -- -- -- -- --    06/11/25 0856 -- -- -- -- -- -- 32 -- 3 L/min  Nasal  cannula -- -- --    06/11/25 0800 -- -- -- -- -- -- 40 -- 5 L/min -- -- 15 --    06/11/25 0731 -- -- -- -- -- -- -- -- -- -- -- -- 3    06/11/25 0723 -- 76 -- 128/80 -- 96 % 40 -- 5 L/min  Nasal cannula  -- -- --    06/11/25 0700 98 °F (36.7 °C) 88 25 157/72 104 98 % -- -- -- -- Lying -- --    06/11/25 0424 -- -- -- -- -- -- -- -- -- -- -- -- 6    06/11/25 0400 98.5 °F (36.9 °C) 103 22 157/75 108 95 % 44 -- 6 L/min Mid flow nasal cannula Lying -- No Pain    06/11/25 0202 -- -- -- -- -- -- 44 6 L/min 6 L/min  Mid flow nasal cannula -- -- --    06/11/25 0000 99 °F (37.2 °C) 96 24 145/78 106 97 % 52 -- 8 L/min Mid flow nasal cannula Lying 15 No Pain    06/10/25 2200 -- 85 24 143/67 97 99 % -- -- -- -- -- -- --    06/10/25 2100 -- 90 22 122/58 84 97 % 52 8 L/min 8 L/min Mid flow nasal cannula -- -- --    06/10/25 2000 -- 88 20 136/68 94 96 % 52 -- 8 L/min Mid flow nasal cannula Lying 15 No Pain    06/10/25 1933 99.9 °F (37.7 °C) -- -- -- -- -- -- -- -- -- -- -- --    06/10/25 1832 -- -- -- -- -- 97 % 52 -- 8 L/min  Mid flow nasal cannula -- -- --    06/10/25 1821 -- -- -- -- -- 99 % 60 -- 10 L/min  Mid flow nasal cannula -- -- --    06/10/25 1807 -- -- -- -- -- 100 % 72 -- 13 L/min  Mid flow nasal cannula -- -- --    06/10/25 1649 -- 104 46 150/70 101 97 % -- -- -- Mid flow nasal cannula -- 15 No Pain    06/10/25 1644 99 °F (37.2 °C) 109 51 173/75 107 94 % -- -- -- Mid flow nasal cannula -- -- --    06/10/25 1639 -- -- -- -- -- -- 80 -- 15 L/min Mid flow nasal cannula -- -- --    06/10/25 1617 -- 98 26 163/77 -- 97 % -- -- -- Non-rebreather mask Sitting -- --    06/10/25 1616 -- -- -- -- -- -- -- -- -- -- -- 15 --    06/10/25 1615 -- 96 28 160/74 106 98 % 80 -- 15 L/min Non-rebreather mask -- -- --    06/10/25 1600 -- 100 32 173/80 115 84 % 28 -- 2 L/min Nasal cannula -- -- --    06/10/25 1530 -- 87 24 176/77 110 92 % 28 -- 2 L/min Nasal cannula -- -- --    06/10/25 1415 -- 77 20 110/59 81 95 % 28 -- 2 L/min Nasal  cannula -- -- --    06/10/25 1345 -- 76 22 117/58 83 94 % 28 -- 2 L/min Nasal cannula -- -- --    06/10/25 1330 -- 78 22 114/59 80 95 % 28 -- 2 L/min Nasal cannula -- -- --    06/10/25 1315 -- 82 20 108/55 76 93 % 28 -- 2 L/min Nasal cannula -- -- --    06/10/25 1300 -- 81 22 111/58 78 91 % 28 -- 2 L/min Nasal cannula -- -- --    06/10/25 1245 -- 79 22 111/55 77 93 % 28 -- 2 L/min Nasal cannula -- -- --    06/10/25 1215 -- 81 20 122/61 83 93 % 28 -- 2 L/min Nasal cannula -- -- --    06/10/25 1200 -- 81 -- 118/56 80 93 % 28 -- 2 L/min Nasal cannula -- -- --    06/10/25 1145 97.6 °F (36.4 °C) 83 22 121/60 84 94 % 28 -- 2 L/min Nasal cannula -- -- --    06/10/25 1130 -- 84 22 125/63 88 92 % 28 -- 2 L/min Nasal cannula -- -- --    06/10/25 1115 -- 85 22 140/68 97 92 % 28 -- 2 L/min Nasal cannula -- -- --    06/10/25 1100 -- 86 20 137/65 93 93 % 28 -- 2 L/min Nasal cannula -- -- --    06/10/25 1059 -- 87 -- 148/69 99 91 % -- -- -- -- -- -- --    06/10/25 1044 -- 89 -- 147/65 93 90 % -- -- -- -- -- -- --    06/10/25 1029 -- 82 -- 138/66 95 91 % -- -- -- -- -- -- --    06/10/25 1015 -- 82 -- 125/60 -- 92 % -- -- -- -- -- -- --    06/10/25 1014 -- -- -- -- -- -- -- -- -- -- -- 15 --    06/10/25 1010 -- 83 -- 125/60 87 93 % -- -- -- -- -- -- --    06/10/25 1004 -- 83 -- 123/59 85 93 % -- -- -- -- -- -- --    06/10/25 0954 -- 95 -- 137/64 92 90 % -- -- -- -- -- -- --    06/10/25 0901 -- 89 -- 139/67 96 95 % -- -- -- -- -- -- --    06/10/25 0851 -- 94 -- 145/74 102 94 % -- -- -- -- -- -- --    06/10/25 0845 -- 94 -- 152/70 100 93 % -- -- -- -- -- -- --    06/10/25 0814 100.2 °F (37.9 °C) 104 24 143/64 -- 90 % -- -- -- None (Room air) Sitting -- 6          Weight (last 2 days)       Date/Time Weight    06/12/25 0500 93.3 (205.69)    06/11/25 0723 94.8 (209)    06/11/25 0600 95.1 (209.66)    06/10/25 1649 94.6 (208.56)    06/10/25 0814 95.7 (211)            Pertinent Labs/Diagnostic Results:   Radiology:  XR chest portable ICU    Final Interpretation by Mery Clark MD (06/11 0807)      Improving right lower lobe pneumonia.            Workstation performed: [id number]         CT chest abdomen pelvis w contrast   Final Interpretation by Saran Kiran MD (06/10 1051)      1. Significant right lower lobe pneumonia and small right pleural effusion. Mediastinal and hilar lymph nodes are likely reactive. Follow-up noncontrast chest CT is recommended in 3 months.      2. Solid 5 mm left upper lobe pulmonary nodule. The nodule will be reassessed on the aforementioned CT.      3. 1.8 cm right adrenal nodule. Although its imaging features are indeterminate, it does not have suspicious imaging features (heterogeneity, necrosis, irregular margins), therefore this is likely benign, and can be followed by non-contrast abdomen CT or    MRI in 12 months. Please note that for adrenal nodule > 1 cm,  biochemical evaluation is suggested to rule out functioning adenoma, if not already performed. Considerations related to the patient's age and/or comorbidities may be used to alter these    recommendations.      The study was marked in EPIC for immediate notification.      Resident: ZEE MALAVE I, the attending radiologist, have reviewed the images and agree with the final report above.      Workstation performed: [id number]         XR chest 1 view portable   ED Interpretation by Brody Riggs MD (06/10 1058)   Right side Pneumonia       Final Interpretation by Khris Gonzalez MD (06/10 1615)      Right lower lobe pneumonia.            Workstation performed: [id number]           Cardiology:  ECG 12 lead   Final Result by Lane Menchaca MD (06/12 0853)   Atrial tachycardia with rapid ventricular response   Left axis deviation   Low voltage QRS   Nonspecific ST-t wave changes   When compared with ECG of 10-Nain-2025 17:59,   Vent. rate has increased by  79 bpm      Confirmed by Lane Menchaca ([id number]) on 6/12/2025 8:53:38 AM      Echo  complete w/ contrast if indicated   Final Result by Lane Menchaca MD (06/11 0832)        Left Ventricle: Left ventricular cavity size is normal. Wall thickness    is normal. The left ventricular ejection fraction is 55%. Systolic    function is normal. Wall motion is normal.     Right Ventricle: Right ventricular cavity size is normal. Systolic    function is normal.     Left Atrium: The atrium is dilated.         ECG 12 lead   Final Result by Lane Menchaca MD (06/11 0836)   Atrial fibrillation   Controlled ventricular response   Nonspecific ST-t wave changes   when compared to  10-Nain-2025 08:24,   No significant change was found      Confirmed by Lane Menchaca ([id number]) on 6/11/2025 8:36:01 AM      ECG 12 lead   Final Result by Lane Menchaca MD (06/10 1104)   Atrial fibrillation   Rightward axis   Intraventricular conduction delay   When compared with ECG of 02-Jan-2023 14:14,   Vent. rate has increased by  35 bpm   Confirmed by Lane Menchaca ([id number]) on 6/10/2025 11:04:09 AM        GI:  No orders to display       Results from last 7 days   Lab Units 06/10/25  0836   SARS-COV-2  Negative     Results from last 7 days   Lab Units 06/12/25  0505 06/11/25  0426 06/10/25  1623 06/10/25  0836   WBC Thousand/uL 12.37* 12.38*  --  12.59*   HEMOGLOBIN g/dL 11.9* 11.5*  --  12.3   I STAT HEMOGLOBIN g/dl  --   --  12.2  --    HEMATOCRIT % 37.6 36.4*  --  38.5   HEMATOCRIT, ISTAT %  --   --  36*  --    PLATELETS Thousands/uL 229 198  --  223   TOTAL NEUT ABS Thousands/µL  --   --   --  10.89*         Results from last 7 days   Lab Units 06/12/25  0505 06/11/25  0426 06/10/25  1729 06/10/25  1623 06/10/25  0836   SODIUM mmol/L 136 135 137  --  131*   POTASSIUM mmol/L 4.3 3.9 4.2  --  4.0   CHLORIDE mmol/L 100 101 103  --  98   CO2 mmol/L 28 25 25  --  26   CO2, I-STAT mmol/L  --   --   --  22  --    ANION GAP mmol/L 8 9 9  --  7   BUN mg/dL 15 15 14  --  15   CREATININE mg/dL 0.82 1.01 1.05  --  1.05   EGFR  ml/min/1.73sq m 85 71 68  --  68   CALCIUM mg/dL 9.0 8.5 8.3*  --  8.7   CALCIUM, IONIZED mmol/L 1.08* 1.06* 1.01*  --   --    CALCIUM, IONIZED, ISTAT mmol/L  --   --   --  1.16  --    MAGNESIUM mg/dL 2.3 1.8* 1.9  --   --    PHOSPHORUS mg/dL 3.3 2.4 3.1  --   --      Results from last 7 days   Lab Units 06/10/25  1729 06/10/25  0836   AST U/L 14 14   ALT U/L 11 10   ALK PHOS U/L 54 55   TOTAL PROTEIN g/dL 6.8 6.8   ALBUMIN g/dL 4.0 4.1   TOTAL BILIRUBIN mg/dL 1.29* 1.59*         Results from last 7 days   Lab Units 06/12/25  0505 06/11/25  0426 06/10/25  1729 06/10/25  0836   GLUCOSE RANDOM mg/dL 131 126 125 125         Results from last 7 days   Lab Units 06/11/25  0426   HEMOGLOBIN A1C % 6.0*   EAG mg/dl 126       Results from last 7 days   Lab Units 06/10/25  1623   I STAT BASE EXC mmol/L -5*   I STAT O2 SAT % 98*   ISTAT PH ART  7.302*   I STAT ART PCO2 mm HG 42.3   I STAT ART PO2 mm .0   I STAT ART HCO3 mmol/L 20.9*         Results from last 7 days   Lab Units 06/10/25  1729   HS TNI 0HR ng/L 16         Results from last 7 days   Lab Units 06/10/25  0836   PROTIME seconds 26.5*   INR  2.41*   PTT seconds 49*         Results from last 7 days   Lab Units 06/12/25  0505 06/11/25  0426 06/10/25  0836   PROCALCITONIN ng/ml 0.95* 0.91* 0.62*     Results from last 7 days   Lab Units 06/10/25  0836   LACTIC ACID mmol/L 1.0     Results from last 7 days   Lab Units 06/10/25  1504   BNP pg/mL 409*       Results from last 7 days   Lab Units 06/10/25  0957   CLARITY UA  Clear   COLOR UA  Yellow   SPEC GRAV UA  1.010   PH UA  6.0   GLUCOSE UA mg/dl Negative   KETONES UA mg/dl 15 (1+)*   BLOOD UA  Negative   PROTEIN UA mg/dl Trace*   NITRITE UA  Negative   BILIRUBIN UA  Negative   UROBILINOGEN UA E.U./dl 0.2   LEUKOCYTES UA  Negative   WBC UA /hpf 0-1   RBC UA /hpf None Seen   BACTERIA UA /hpf Occasional   EPITHELIAL CELLS WET PREP /hpf Occasional     Results from last 7 days   Lab Units 06/10/25  1503 06/10/25  0836    STREP PNEUMONIAE ANTIGEN, URINE  Negative  --    LEGIONELLA URINARY ANTIGEN  Negative  --    INFLUENZA A PCR   --  Negative   INFLUENZA B PCR   --  Negative   RSV PCR   --  Negative       Results from last 7 days   Lab Units 06/10/25  0836   BLOOD CULTURE  No Growth at 48 hrs.  No Growth at 48 hrs.         Network Utilization Review Department  ATTENTION: Please call with any questions or concerns to [phone] and carefully listen to the prompts so that you are directed to the right person. All voicemails are confidential.   For Discharge needs, contact Care Management DC Support Team at [phone] opt. [phone]  Send all requests for admission clinical reviews, approved or denied determinations and any other requests to dedicated fax number below belonging to the campus where the patient is receiving treatment. List of dedicated fax numbers for the Facilities:  FACILITY NAME UR FAX NUMBER   ADMISSION DENIALS (Administrative/Medical Necessity) [phone]   DISCHARGE SUPPORT TEAM (NETWORK) [phone]   PARENT CHILD HEALTH (Maternity/NICU/Pediatrics) [phone]   [facility] [phone]   [facility] [phone]   [facility] [phone]   [facility] [phone]   [facility] [phone]   [facility] [phone]   [facility] [phone]   [facility] [phone]   [facility] [phone]   [facility] [phone]   [facility] [phone]   [facility] [phone]

## 2025-06-12 NOTE — PLAN OF CARE
Problem: Potential for Falls  Goal: Patient will remain free of falls  Description: INTERVENTIONS:  - Educate patient/family on patient safety including physical limitations  - Instruct patient to call for assistance with activity   - Consider consulting OT/PT to assist with strengthening/mobility based on AM PAC & JH-HLM score  - Consult OT/PT to assist with strengthening/mobility   - Keep Call bell within reach  - Keep bed low and locked with side rails adjusted as appropriate  - Keep care items and personal belongings within reach  - Initiate and maintain comfort rounds  - Make Fall Risk Sign visible to staff  - Offer Toileting every  Hours, in advance of need  - Initiate/Maintain alarm  - Obtain necessary fall risk management equipment:   - Apply yellow socks and bracelet for high fall risk patients  - Consider moving patient to room near nurses station  Outcome: Progressing     Problem: PAIN - ADULT  Goal: Verbalizes/displays adequate comfort level or baseline comfort level  Description: Interventions:  - Encourage patient to monitor pain and request assistance  - Assess pain using appropriate pain scale  - Administer analgesics as ordered based on type and severity of pain and evaluate response  - Implement non-pharmacological measures as appropriate and evaluate response  - Consider cultural and social influences on pain and pain management  - Notify physician/advanced practitioner if interventions unsuccessful or patient reports new pain  - Educate patient/family on pain management process including their role and importance of  reporting pain   - Provide non-pharmacologic/complimentary pain relief interventions  Outcome: Progressing     Problem: INFECTION - ADULT  Goal: Absence or prevention of progression during hospitalization  Description: INTERVENTIONS:  - Assess and monitor for signs and symptoms of infection  - Monitor lab/diagnostic results  - Monitor all insertion sites, i.e. indwelling lines,  tubes, and drains  - Monitor endotracheal if appropriate and nasal secretions for changes in amount and color  - [place] appropriate cooling/warming therapies per order  - Administer medications as ordered  - Instruct and encourage patient and family to use good hand hygiene technique  - Identify and instruct in appropriate isolation precautions for identified infection/condition  Outcome: Progressing     Problem: SAFETY ADULT  Goal: Patient will remain free of falls  Description: INTERVENTIONS:  - Educate patient/family on patient safety including physical limitations  - Instruct patient to call for assistance with activity   - Consider consulting OT/PT to assist with strengthening/mobility based on AM PAC & -HLM score  - Consult OT/PT to assist with strengthening/mobility   - Keep Call bell within reach  - Keep bed low and locked with side rails adjusted as appropriate  - Keep care items and personal belongings within reach  - Initiate and maintain comfort rounds  - Make Fall Risk Sign visible to staff  - Offer Toileting every  Hours, in advance of need  - Initiate/Maintain alarm  - Obtain necessary fall risk management equipment:   - Apply yellow socks and bracelet for high fall risk patients  - Consider moving patient to room near nurses station  Outcome: Progressing  Goal: Maintain or return to baseline ADL function  Description: INTERVENTIONS:  -  Assess patient's ability to carry out ADLs; assess patient's baseline for ADL function and identify physical deficits which impact ability to perform ADLs (bathing, care of mouth/teeth, toileting, grooming, dressing, etc.)  - Assess/evaluate cause of self-care deficits   - Assess range of motion  - Assess patient's mobility; develop plan if impaired  - Assess patient's need for assistive devices and provide as appropriate  - Encourage maximum independence but intervene and supervise when necessary  - Involve family in performance of ADLs  - Assess for home care  needs following discharge   - Consider OT consult to assist with ADL evaluation and planning for discharge  - Provide patient education as appropriate  - Monitor functional capacity and physical performance, use of AM PAC & JH-HLM   - Monitor gait, balance and fatigue with ambulation    Outcome: Progressing  Goal: Maintains/Returns to pre admission functional level  Description: INTERVENTIONS:  - Perform AM-PAC 6 Click Basic Mobility/ Daily Activity assessment daily.  - Set and communicate daily mobility goal to care team and patient/family/caregiver.   - Collaborate with rehabilitation services on mobility goals if consulted  - Perform Range of Motion  times a day.  - Reposition patient every  hours.  - Dangle patient  times a day  - Stand patient  times a day  - Ambulate patient  times a day  - Out of bed to chair  times a day   - Out of bed for meals  times a day  - Out of bed for toileting  - Record patient progress and toleration of activity level   Outcome: Progressing     Problem: DISCHARGE PLANNING  Goal: Discharge to home or other facility with appropriate resources  Description: INTERVENTIONS:  - Identify barriers to discharge w/patient and caregiver  - Arrange for needed discharge resources and transportation as appropriate  - Identify discharge learning needs (meds, wound care, etc.)  - Arrange for interpretive services to assist at discharge as needed  - Refer to Case Management Department for coordinating discharge planning if the patient needs post-hospital services based on physician/advanced practitioner order or complex needs related to functional status, cognitive ability, or social support system  Outcome: Progressing     Problem: Knowledge Deficit  Goal: Patient/family/caregiver demonstrates understanding of disease process, treatment plan, medications, and discharge instructions  Description: Complete learning assessment and assess knowledge base.  Interventions:  - Provide teaching at level of  understanding  - Provide teaching via preferred learning methods  Outcome: Progressing     Problem: RESPIRATORY - ADULT  Goal: Achieves optimal ventilation and oxygenation  Description: INTERVENTIONS:  - Assess for changes in respiratory status  - Assess for changes in mentation and behavior  - Position to facilitate oxygenation and minimize respiratory effort  - Oxygen administered by appropriate delivery if ordered  - Initiate smoking cessation education as indicated  - Encourage broncho-pulmonary hygiene including cough, deep breathe, Incentive Spirometry  - Assess the need for suctioning and aspirate as needed  - Assess and instruct to report SOB or any respiratory difficulty  - Respiratory Therapy support as indicated  Outcome: Progressing     Problem: SKIN/TISSUE INTEGRITY - ADULT  Goal: Skin Integrity remains intact(Skin Breakdown Prevention)  Description: Assess:  -Perform Micky assessment every   -Clean and moisturize skin every   -Inspect skin when repositioning, toileting, and assisting with ADLS  -Assess under medical devices such as  every   -Assess extremities for adequate circulation and sensation     Bed Management:  -Have minimal linens on bed & keep smooth, unwrinkled  -Change linens as needed when moist or perspiring  -Avoid sitting or lying in one position for more than  hours while in bed  -Keep HOB at degrees     Toileting:  -Offer bedside commode  -Assess for incontinence every   -Use incontinent care products after each incontinent episode such as     Activity:  -Mobilize patient  times a day  -Encourage activity and walks on unit  -Encourage or provide ROM exercises   -Turn and reposition patient every  Hours  -Use appropriate equipment to lift or move patient in bed  -Instruct/ Assist with weight shifting every  when out of bed in chair  -Consider limitation of chair time    Skin Care:  -Avoid use of baby powder, tape, friction and shearing, hot water or constrictive clothing  -Relieve  pressure over bony prominences using   -Do not massage red bony areas    Next Steps:  -Teach patient strategies to minimize risks such as    -Consider consults to  interdisciplinary teams such as   Outcome: Progressing

## 2025-06-12 NOTE — PHYSICAL THERAPY NOTE
PT Treatment Note    NAME:  Ramon Keith  DATE: 06/12/25    AGE:   74 y.o.  Mrn:   [id number]  ADMIT DX:  Diarrhea [R19.7]  Pneumonia [J18.9]  Dyspnea [R06.00]  Nausea and vomiting [R11.2]  Hypoxic [R09.02]  Fever [R50.9]  Atrial fib/flutter, transient (HCC) [I48.91, I48.92]  Performed at least 2 patient identifiers during session: Name and ID bracelet       06/12/25 0820   PT Last Visit   PT Visit Date 06/12/25   Note Type   Note Type Treatment   Pain Assessment   Pain Assessment Tool 0-10   Pain Score 2   Pain Location/Orientation Location: Abdomen   Pain Onset/Description Descriptor: [facility] Pain Intervention(s) Repositioned   Restrictions/Precautions   Weight Bearing Precautions Per Order No   Other Precautions O2;Fall Risk;Pain;Chair Alarm;Contact/isolation;Telemetry;Visual impairment  (2 L/min)   General   Chart Reviewed Yes   Family/Caregiver Present No   Cognition   Overall Cognitive Status WFL   Arousal/Participation Alert   Attention Within functional limits   Orientation Level Oriented X4   Memory Within functional limits   Following Commands Follows all commands and directions without difficulty   Bed Mobility   Supine to Sit 5  Supervision   Additional items HOB elevated;Increased time required   Additional Comments pt able to sit EOB with Supervision   Transfers   Sit to Stand   (CGA)   Additional items Assist x 1;Increased time required;Verbal cues   Stand to Sit 5  Supervision   Additional items Assist x 1;Increased time required;Armrests;Verbal cues   Additional Comments pt reported dizziness with transitional movement that subsided with rest   Ambulation/Elevation   Gait pattern Improper Weight shift;Decreased foot clearance;Excessively slow   Gait Assistance   (CGA)   Additional items Assist x 1;Verbal cues   Assistive Device Rolling walker   Distance 20 ft   Ambulation/Elevation Additional Comments good safety awareness noted   Balance   Static Sitting Good   Dynamic Sitting  Fair +   Static Standing Fair   Dynamic Standing Fair -   Ambulatory Fair -   Endurance Deficit   Endurance Deficit Yes   Endurance Deficit Description slight SOB noted with activity   Activity Tolerance   Activity Tolerance Patient limited by fatigue;Patient limited by pain   Nurse Made Aware RN made aware of session outcomes   Exercises   Hip Adduction Sitting;15 reps;AROM;Bilateral   Knee AROM Long Arc Quad Sitting;15 reps;AROM;Bilateral   Ankle Pumps Sitting;15 reps;AROM;Bilateral   Assessment   Prognosis Fair   Assessment Pt seen for PT treatment session this date with interventions consisting of gait training to normalize gait pattern to decrease fall risk and therapeutic exercise to improve endurance to improve functional mobility. Pt agreeable to PT treatment session upon arrival, pt found supine in bed, in no apparent distress. Since previous session, pt has made good progress as evidenced by decreased assistance required with mobility and increased activity tolerance  Barriers during this session include pain, SOB, and fatigue.  Pt continues to be functioning below baseline level, and remains limited 2* factors listed above and including impaired activity tolerance, impaired  balance, pain, decreased endurance, and decreased mobility.  Pt prognosis for achieving goals is good, pending pt progress with hospitalization/medical status improvements, and indicated by motivated to participate in therapy, ability to follow cues, and improvement with mobility status. PT will continue to see pt during current hospitalization in order to address the deficits listed above and provide interventions consistent w/ POC in effort to achieve goals. Current goals and POC remain appropriate, pt continues to have rehab potential  Upon conclusion pt seated OOB in recliner. The patient's AM-PAC Basic Mobility Inpatient Short Form Raw Score is 19.  A Raw score of greater than 16 suggests the patient may benefit from discharge to  home. Based on patient presentations and impairments, pt would most appropriately benefit from Level 3 resource intensity upon discharge.  Please also refer to the recommendation of the Physical Therapist for safe discharge planning. RN verbalized pt appropriate for PT session.   Barriers to Discharge Inaccessible home environment   Goals   Patient Goals to rest   LTG Expiration Date 06/21/25   PT Treatment Day 1   Plan   Treatment/Interventions Functional transfer training;Therapeutic exercise;Endurance training;Gait training;Bed mobility;Equipment eval/education   Progress Progressing toward goals   PT Frequency 3-5x/wk   Discharge Recommendation   Rehab Resource Intensity Level, PT III (Minimum Resource Intensity)   Equipment Recommended Walker   Walker Package Recommended Wheeled walker   AM-PAC Basic Mobility Inpatient   Turning in Flat Bed Without Bedrails 4   Lying on Back to Sitting on Edge of Flat Bed Without Bedrails 3   Moving Bed to Chair 3   Standing Up From Chair Using Arms 3   Walk in Room 3   Climb 3-5 Stairs With Railing 3   Basic Mobility Inpatient Raw Score 19   Basic Mobility Standardized Score 42.48   [facility] Highest Level Of Mobility   JH-HLM Goal 6: Walk 10 steps or more   JH-HLM Achieved 6: Walk 10 steps or more       Time In: 0818  Time Out: 0847  Total Treatment Minutes: 29    Charity Atkinson, PT

## 2025-06-12 NOTE — PLAN OF CARE
Problem: PHYSICAL THERAPY ADULT  Goal: Performs mobility at highest level of function for planned discharge setting.  See evaluation for individualized goals.  Description: Treatment/Interventions: Functional transfer training, Therapeutic exercise, Endurance training, Gait training, Bed mobility, Equipment eval/education, Elevations  Equipment Recommended: Walker       See flowsheet documentation for full assessment, interventions and recommendations.  Outcome: Progressing  Note: Prognosis: Fair     Assessment: Pt seen for PT treatment session this date with interventions consisting of gait training to normalize gait pattern to decrease fall risk and therapeutic exercise to improve endurance to improve functional mobility. Pt agreeable to PT treatment session upon arrival, pt found supine in bed, in no apparent distress. Since previous session, pt has made good progress as evidenced by decreased assistance required with mobility and increased activity tolerance  Barriers during this session include pain, SOB, and fatigue.  Pt continues to be functioning below baseline level, and remains limited 2* factors listed above and including impaired activity tolerance, impaired  balance, pain, decreased endurance, and decreased mobility.  Pt prognosis for achieving goals is good, pending pt progress with hospitalization/medical status improvements, and indicated by motivated to participate in therapy, ability to follow cues, and improvement with mobility status. PT will continue to see pt during current hospitalization in order to address the deficits listed above and provide interventions consistent w/ POC in effort to achieve goals. Current goals and POC remain appropriate, pt continues to have rehab potential  Upon conclusion pt seated OOB in recliner. The patient's AM-PAC Basic Mobility Inpatient Short Form Raw Score is 19.  A Raw score of greater than 16 suggests the patient may benefit from discharge to home. Based  on patient presentations and impairments, pt would most appropriately benefit from Level 3 resource intensity upon discharge.  Please also refer to the recommendation of the Physical Therapist for safe discharge planning. RN verbalized pt appropriate for PT session.  Barriers to Discharge: Inaccessible home environment     Rehab Resource Intensity Level, PT: III (Minimum Resource Intensity)    See flowsheet documentation for full assessment.

## 2025-06-12 NOTE — ASSESSMENT & PLAN NOTE
Admitted 6/10 under SLIM for sepsis 2/2 RLL pneumonia w/only 2L NC requirement - rapid response called same afternoon for increased WOB/hypoxia requiring escalation to [facility].  Patient was transferred to stepdown level 1 ICU transfer  Suspect this was initially in setting of RLL pneumonia w/small R pleural effusion (likely parapneumonic), however suspect escalation in O2 related to flash pulmonary edema(see plan for same)  See also plan for sepsis  6/12 patient stabilized and slim resumed care of patient  On 2 L nasal cannula oxygen as of this morning  Encourage mobility, I-S, out of bed to chair  Respiratory protocol continue to wean oxygen as able

## 2025-06-12 NOTE — ASSESSMENT & PLAN NOTE
Noted to be hypertensive at time of rapid response  Has underlying HTN and received 3L crystalloid  Is s/p IV Lasix 80 mg x1  BPs are now well-controlled  Continue prehospital amlodipine, lisinopril, metoprolol  Continue as needed labetalol  Monitor BP per protocol

## 2025-06-13 ENCOUNTER — APPOINTMENT (INPATIENT)
Dept: RADIOLOGY | Facility: HOSPITAL | Age: 75
DRG: 871 | End: 2025-06-13
Payer: COMMERCIAL

## 2025-06-13 PROBLEM — R53.1 GENERALIZED WEAKNESS: Status: ACTIVE | Noted: 2025-06-13

## 2025-06-13 LAB
ANION GAP SERPL CALCULATED.3IONS-SCNC: 7 MMOL/L (ref 4–13)
BUN SERPL-MCNC: 13 MG/DL (ref 5–25)
CALCIUM SERPL-MCNC: 8.6 MG/DL (ref 8.4–10.2)
CHLORIDE SERPL-SCNC: 101 MMOL/L (ref 96–108)
CO2 SERPL-SCNC: 29 MMOL/L (ref 21–32)
CREAT SERPL-MCNC: 0.73 MG/DL (ref 0.6–1.3)
ERYTHROCYTE [DISTWIDTH] IN BLOOD BY AUTOMATED COUNT: 13.4 % (ref 11.6–15.1)
GFR SERPL CREATININE-BSD FRML MDRD: 90 ML/MIN/1.73SQ M
GLUCOSE SERPL-MCNC: 137 MG/DL (ref 65–140)
HCT VFR BLD AUTO: 34.9 % (ref 36.5–49.3)
HGB BLD-MCNC: 10.8 G/DL (ref 12–17)
MCH RBC QN AUTO: 29.7 PG (ref 26.8–34.3)
MCHC RBC AUTO-ENTMCNC: 30.9 G/DL (ref 31.4–37.4)
MCV RBC AUTO: 96 FL (ref 82–98)
PLATELET # BLD AUTO: 230 THOUSANDS/UL (ref 149–390)
PMV BLD AUTO: 9.8 FL (ref 8.9–12.7)
POTASSIUM SERPL-SCNC: 3.9 MMOL/L (ref 3.5–5.3)
RBC # BLD AUTO: 3.64 MILLION/UL (ref 3.88–5.62)
SODIUM SERPL-SCNC: 137 MMOL/L (ref 135–147)
WBC # BLD AUTO: 10.97 THOUSAND/UL (ref 4.31–10.16)

## 2025-06-13 PROCEDURE — 80048 BASIC METABOLIC PNL TOTAL CA: CPT | Performed by: INTERNAL MEDICINE

## 2025-06-13 PROCEDURE — 97110 THERAPEUTIC EXERCISES: CPT

## 2025-06-13 PROCEDURE — 94664 DEMO&/EVAL PT USE INHALER: CPT

## 2025-06-13 PROCEDURE — 99232 SBSQ HOSP IP/OBS MODERATE 35: CPT

## 2025-06-13 PROCEDURE — 85027 COMPLETE CBC AUTOMATED: CPT | Performed by: INTERNAL MEDICINE

## 2025-06-13 PROCEDURE — 71045 X-RAY EXAM CHEST 1 VIEW: CPT

## 2025-06-13 RX ADMIN — ACETAMINOPHEN 650 MG: 325 TABLET ORAL at 08:47

## 2025-06-13 RX ADMIN — GUAIFENESIN 1200 MG: 600 TABLET ORAL at 17:59

## 2025-06-13 RX ADMIN — FAMOTIDINE 20 MG: 20 TABLET, FILM COATED ORAL at 17:59

## 2025-06-13 RX ADMIN — CEFTRIAXONE SODIUM 1000 MG: 10 INJECTION, POWDER, FOR SOLUTION INTRAVENOUS at 10:51

## 2025-06-13 RX ADMIN — APIXABAN 5 MG: 5 TABLET, FILM COATED ORAL at 18:00

## 2025-06-13 RX ADMIN — FLECAINIDE ACETATE 50 MG: 50 TABLET ORAL at 08:47

## 2025-06-13 RX ADMIN — GUAIFENESIN 1200 MG: 600 TABLET ORAL at 08:47

## 2025-06-13 RX ADMIN — AZITHROMYCIN DIHYDRATE 500 MG: 250 TABLET ORAL at 08:47

## 2025-06-13 RX ADMIN — LISINOPRIL 10 MG: 10 TABLET ORAL at 08:47

## 2025-06-13 RX ADMIN — AMLODIPINE BESYLATE 5 MG: 5 TABLET ORAL at 08:47

## 2025-06-13 RX ADMIN — APIXABAN 5 MG: 5 TABLET, FILM COATED ORAL at 08:47

## 2025-06-13 RX ADMIN — METOPROLOL SUCCINATE 25 MG: 25 TABLET, EXTENDED RELEASE ORAL at 08:47

## 2025-06-13 RX ADMIN — Medication 1 TABLET: at 08:47

## 2025-06-13 RX ADMIN — LATANOPROST 1 DROP: 50 SOLUTION OPHTHALMIC at 22:46

## 2025-06-13 RX ADMIN — TAMSULOSIN HYDROCHLORIDE 0.4 MG: 0.4 CAPSULE ORAL at 18:01

## 2025-06-13 RX ADMIN — FLECAINIDE ACETATE 50 MG: 50 TABLET ORAL at 18:00

## 2025-06-13 RX ADMIN — FAMOTIDINE 20 MG: 20 TABLET, FILM COATED ORAL at 08:47

## 2025-06-13 RX ADMIN — PRAVASTATIN SODIUM 20 MG: 20 TABLET ORAL at 08:47

## 2025-06-13 NOTE — ASSESSMENT & PLAN NOTE
Present on arrival to ED   AEB tachycardia, tachypnea, leukocytosis  Is s/p IV fluid resuscitation (6/10)  Workup:   CXR 6/10: Right lower lobe pneumonia  COVID/FLU/RSV negative   Blood cultures negative at 48 hours  UA w/out evidence of infection  Urine strep/legionella negative  Procalcitonin 0.95.   Patient with low-grade fever, mild but improving leukocytosis today, some tachypnea and tachycardia with exertion  Continue IV Ceftriaxone and Azithromycin. Will attempt to de-escalate tomorrow if able.   Obtain sputum culture  Repeat CBC/BMP in the AM.

## 2025-06-13 NOTE — PLAN OF CARE
Problem: Potential for Falls  Goal: Patient will remain free of falls  Description: INTERVENTIONS:  - Educate patient/family on patient safety including physical limitations  - Instruct patient to call for assistance with activity   - Consider consulting OT/PT to assist with strengthening/mobility based on AM PAC & JH-HLM score  - Consult OT/PT to assist with strengthening/mobility   - Keep Call bell within reach  - Keep bed low and locked with side rails adjusted as appropriate  - Keep care items and personal belongings within reach  - Initiate and maintain comfort rounds  - Make Fall Risk Sign visible to staff  - Offer Toileting every 2 Hours, in advance of need  - Initiate/Maintain bed alarm  - Obtain necessary fall risk management equipment  - Apply yellow socks and bracelet for high fall risk patients  - Consider moving patient to room near nurses station  Outcome: Progressing     Problem: PAIN - ADULT  Goal: Verbalizes/displays adequate comfort level or baseline comfort level  Description: Interventions:  - Encourage patient to monitor pain and request assistance  - Assess pain using appropriate pain scale  - Administer analgesics as ordered based on type and severity of pain and evaluate response  - Implement non-pharmacological measures as appropriate and evaluate response  - Consider cultural and social influences on pain and pain management  - Notify physician/advanced practitioner if interventions unsuccessful or patient reports new pain  - Educate patient/family on pain management process including their role and importance of  reporting pain   - Provide non-pharmacologic/complimentary pain relief interventions  Outcome: Progressing     Problem: INFECTION - ADULT  Goal: Absence or prevention of progression during hospitalization  Description: INTERVENTIONS:  - Assess and monitor for signs and symptoms of infection  - Monitor lab/diagnostic results  - Monitor all insertion sites, i.e. indwelling lines,  tubes, and drains  - Monitor endotracheal if appropriate and nasal secretions for changes in amount and color  - [place] appropriate cooling/warming therapies per order  - Administer medications as ordered  - Instruct and encourage patient and family to use good hand hygiene technique  - Identify and instruct in appropriate isolation precautions for identified infection/condition  Outcome: Progressing     Problem: SAFETY ADULT  Goal: Patient will remain free of falls  Description: INTERVENTIONS:  - Educate patient/family on patient safety including physical limitations  - Instruct patient to call for assistance with activity   - Consider consulting OT/PT to assist with strengthening/mobility based on AM PAC & -HLM score  - Consult OT/PT to assist with strengthening/mobility   - Keep Call bell within reach  - Keep bed low and locked with side rails adjusted as appropriate  - Keep care items and personal belongings within reach  - Initiate and maintain comfort rounds  - Make Fall Risk Sign visible to staff  - Offer Toileting every 2 Hours, in advance of need  - Initiate/Maintain bed alarm  - Obtain necessary fall risk management equipment  - Apply yellow socks and bracelet for high fall risk patients  - Consider moving patient to room near nurses station  Outcome: Progressing  Goal: Maintain or return to baseline ADL function  Description: INTERVENTIONS:  -  Assess patient's ability to carry out ADLs; assess patient's baseline for ADL function and identify physical deficits which impact ability to perform ADLs (bathing, care of mouth/teeth, toileting, grooming, dressing, etc.)  - Assess/evaluate cause of self-care deficits   - Assess range of motion  - Assess patient's mobility; develop plan if impaired  - Assess patient's need for assistive devices and provide as appropriate  - Encourage maximum independence but intervene and supervise when necessary  - Involve family in performance of ADLs  - Assess for home  care needs following discharge   - Consider OT consult to assist with ADL evaluation and planning for discharge  - Provide patient education as appropriate  - Monitor functional capacity and physical performance, use of AM PAC & JH-HLM   - Monitor gait, balance and fatigue with ambulation    Outcome: Progressing  Goal: Maintains/Returns to pre admission functional level  Description: INTERVENTIONS:  - Perform AM-PAC 6 Click Basic Mobility/ Daily Activity assessment daily.  - Set and communicate daily mobility goal to care team and patient/family/caregiver.   - Collaborate with rehabilitation services on mobility goals if consulted  - Perform Range of Motion 3 times a day.  - Reposition patient every 2 hours.  - Dangle patient 3 times a day  - Stand patient 3 times a day  - Ambulate patient 3 times a day  - Out of bed to chair 3 times a day   - Out of bed for meals 3 times a day  - Out of bed for toileting  - Record patient progress and toleration of activity level   Outcome: Progressing     Problem: DISCHARGE PLANNING  Goal: Discharge to home or other facility with appropriate resources  Description: INTERVENTIONS:  - Identify barriers to discharge w/patient and caregiver  - Arrange for needed discharge resources and transportation as appropriate  - Identify discharge learning needs (meds, wound care, etc.)  - Arrange for interpretive services to assist at discharge as needed  - Refer to Case Management Department for coordinating discharge planning if the patient needs post-hospital services based on physician/advanced practitioner order or complex needs related to functional status, cognitive ability, or social support system  Outcome: Progressing     Problem: Knowledge Deficit  Goal: Patient/family/caregiver demonstrates understanding of disease process, treatment plan, medications, and discharge instructions  Description: Complete learning assessment and assess knowledge base.  Interventions:  - Provide teaching  at level of understanding  - Provide teaching via preferred learning methods  Outcome: Progressing     Problem: RESPIRATORY - ADULT  Goal: Achieves optimal ventilation and oxygenation  Description: INTERVENTIONS:  - Assess for changes in respiratory status  - Assess for changes in mentation and behavior  - Position to facilitate oxygenation and minimize respiratory effort  - Oxygen administered by appropriate delivery if ordered  - Initiate smoking cessation education as indicated  - Encourage broncho-pulmonary hygiene including cough, deep breathe, Incentive Spirometry  - Assess the need for suctioning and aspirate as needed  - Assess and instruct to report SOB or any respiratory difficulty  - Respiratory Therapy support as indicated  Outcome: Progressing     Problem: SKIN/TISSUE INTEGRITY - ADULT  Goal: Skin Integrity remains intact(Skin Breakdown Prevention)  Description: Assess:  -Perform Micky assessment   -Clean and moisturize skin   -Inspect skin when repositioning, toileting, and assisting with ADLS  -Assess under medical devices  -Assess extremities for adequate circulation and sensation     Bed Management:  -Have minimal linens on bed & keep smooth, unwrinkled  -Change linens as needed when moist or perspiring  -Avoid sitting or lying in one position for more than 2 hours while in bed  -Keep HOB at 30 degrees     Toileting:  -Offer bedside commode  -Assess for incontinence  -Use incontinent care products after each incontinent episode    Activity:  -Mobilize patient 3 times a day  -Encourage activity and walks on unit  -Encourage or provide ROM exercises   -Turn and reposition patient every 2 Hours  -Use appropriate equipment to lift or move patient in bed  -Instruct/ Assist with weight shifting when out of bed in chair  -Consider limitation of chair time 2 hour intervals    Skin Care:  -Avoid use of baby powder, tape, friction and shearing, hot water or constrictive clothing  -Relieve pressure over bony  prominences  -Do not massage red bony areas    Next Steps:  -Teach patient strategies to minimize risks   -Consider consults to  interdisciplinary teams   Outcome: Progressing

## 2025-06-13 NOTE — PLAN OF CARE
Problem: PHYSICAL THERAPY ADULT  Goal: Performs mobility at highest level of function for planned discharge setting.  See evaluation for individualized goals.  Description: Treatment/Interventions: Functional transfer training, Therapeutic exercise, Endurance training, Gait training, Bed mobility, Equipment eval/education, Elevations  Equipment Recommended: Walker       See flowsheet documentation for full assessment, interventions and recommendations.  Outcome: Progressing  Note: Prognosis: Fair     Assessment: Pt seen for PT treatment session this date with interventions consisting of therapeutic exercise to improve endurance to improve functional mobility. Pt agreeable to PT treatment session upon arrival, pt found seated OOB in recliner, in no apparent distress. Since previous session, pt has made no progress as evidenced by  inability to tolerate mobility   Barriers during this session include dizziness and fatigue.  Pt continues to be functioning below baseline level, and remains limited 2* factors listed above and including impaired activity tolerance, impaired  balance, decreased endurance, and decreased mobility.  Pt prognosis for achieving goals is fair, pending pt progress with hospitalization/medical status improvements, and indicated by motivated to participate in therapy and continued required assistance. PT will continue to see pt during current hospitalization in order to address the deficits listed above and provide interventions consistent w/ POC in effort to achieve goals. Current goals and POC remain appropriate, pt continues to have rehab potential  Upon conclusion pt seated OOB in recliner. The patient's AM-PAC Basic Mobility Inpatient Short Form Raw Score is 19.  A Raw score of greater than 16 suggests the patient may benefit from discharge to home. Based on patient presentations and impairments, pt would most appropriately benefit from Level 3 resource intensity upon discharge.  Please also  refer to the recommendation of the Physical Therapist for safe discharge planning. RN verbalized pt appropriate for PT session.  Barriers to Discharge: Inaccessible home environment     Rehab Resource Intensity Level, PT: III (Minimum Resource Intensity)    See flowsheet documentation for full assessment.

## 2025-06-13 NOTE — ASSESSMENT & PLAN NOTE
Admitted 6/10 under SLIM for sepsis 2/2 RLL pneumonia w/only 2L NC requirement - rapid response called same afternoon for increased WOB/hypoxia requiring escalation to [facility].  Patient was transferred to stepdown level 1 ICU transfer  Suspect this was initially in setting of RLL pneumonia w/small R pleural effusion (likely parapneumonic), however suspect escalation in O2 related to flash pulmonary edema(see plan for same)  See also plan for sepsis  6/12 patient stabilized and slim resumed care of patient  On 2 L nasal cannula oxygen - goal to wean oxygen as able, following respiratory protocol.   Encourage mobility, I-S, out of bed to chair

## 2025-06-13 NOTE — ASSESSMENT & PLAN NOTE
Rapid response called afternoon of 6/10 for escalating O2 requirements after receiving volume resuscitation - noted to have increased WOB/hypoxia/diffuse rales w/expiratory wheezing-patient was transferred to ICU as a level 1 stepdown  See also plan for acute respiratory failure with hypoxia  6/11 TTE LVEF 55% with normal systolic function and normal wall motion.  Right ventricular normal systolic function  Patient is not on diuretics in the outpatient setting  Is s/p IV Lasix 80 mg x1 (6/10)  Continue to monitor fluid status closely  Daily weight/I & O  Patient is -1.7 L to date, euvolemic on exam  Patient is not receiving diuretics currently  Patient is stable on 2 L nasal cannula oxygen, with goal to wean per respiratory protocol.  He is not on oxygen at baseline.  Will obtain follow-up chest x-ray today.

## 2025-06-13 NOTE — ASSESSMENT & PLAN NOTE
Is s/p successful cardioversion in past, however currently in AF (rate-controlled)  Continue home AC w/Eliquis  Continue home metoprolol for rate control  Patient had an incident of elevated heart rate yesterday a.m.- EKG revealed atrial tachycardia with rapid ventricular response heart rate 173.  Per nursing, patient had no chest pain at that time. He was receiving a.m. care at the time likely related to exertion. No further incidences reported.   Will dc telemetry

## 2025-06-13 NOTE — PLAN OF CARE
Problem: Potential for Falls  Goal: Patient will remain free of falls  Description: INTERVENTIONS:  - Educate patient/family on patient safety including physical limitations  - Instruct patient to call for assistance with activity   - Consider consulting OT/PT to assist with strengthening/mobility based on AM PAC & JH-HLM score  - Consult OT/PT to assist with strengthening/mobility   - Keep Call bell within reach  - Keep bed low and locked with side rails adjusted as appropriate  - Keep care items and personal belongings within reach  - Initiate and maintain comfort rounds  - Make Fall Risk Sign visible to staff  - Offer Toileting every  Hours, in advance of need  - Initiate/Maintain alarm  - Obtain necessary fall risk management equipment:   - Apply yellow socks and bracelet for high fall risk patients  - Consider moving patient to room near nurses station  Outcome: Progressing     Problem: PAIN - ADULT  Goal: Verbalizes/displays adequate comfort level or baseline comfort level  Description: Interventions:  - Encourage patient to monitor pain and request assistance  - Assess pain using appropriate pain scale  - Administer analgesics as ordered based on type and severity of pain and evaluate response  - Implement non-pharmacological measures as appropriate and evaluate response  - Consider cultural and social influences on pain and pain management  - Notify physician/advanced practitioner if interventions unsuccessful or patient reports new pain  - Educate patient/family on pain management process including their role and importance of  reporting pain   - Provide non-pharmacologic/complimentary pain relief interventions  Outcome: Progressing     Problem: INFECTION - ADULT  Goal: Absence or prevention of progression during hospitalization  Description: INTERVENTIONS:  - Assess and monitor for signs and symptoms of infection  - Monitor lab/diagnostic results  - Monitor all insertion sites, i.e. indwelling lines,  tubes, and drains  - Monitor endotracheal if appropriate and nasal secretions for changes in amount and color  - Lockhart appropriate cooling/warming therapies per order  - Administer medications as ordered  - Instruct and encourage patient and family to use good hand hygiene technique  - Identify and instruct in appropriate isolation precautions for identified infection/condition  Outcome: Progressing     Problem: SAFETY ADULT  Goal: Patient will remain free of falls  Description: INTERVENTIONS:  - Educate patient/family on patient safety including physical limitations  - Instruct patient to call for assistance with activity   - Consider consulting OT/PT to assist with strengthening/mobility based on AM PAC & -HLM score  - Consult OT/PT to assist with strengthening/mobility   - Keep Call bell within reach  - Keep bed low and locked with side rails adjusted as appropriate  - Keep care items and personal belongings within reach  - Initiate and maintain comfort rounds  - Make Fall Risk Sign visible to staff  - Offer Toileting every  Hours, in advance of need  - Initiate/Maintain alarm  - Obtain necessary fall risk management equipment:   - Apply yellow socks and bracelet for high fall risk patients  - Consider moving patient to room near nurses station  Outcome: Progressing  Goal: Maintain or return to baseline ADL function  Description: INTERVENTIONS:  -  Assess patient's ability to carry out ADLs; assess patient's baseline for ADL function and identify physical deficits which impact ability to perform ADLs (bathing, care of mouth/teeth, toileting, grooming, dressing, etc.)  - Assess/evaluate cause of self-care deficits   - Assess range of motion  - Assess patient's mobility; develop plan if impaired  - Assess patient's need for assistive devices and provide as appropriate  - Encourage maximum independence but intervene and supervise when necessary  - Involve family in performance of ADLs  - Assess for home care  needs following discharge   - Consider OT consult to assist with ADL evaluation and planning for discharge  - Provide patient education as appropriate  - Monitor functional capacity and physical performance, use of AM PAC & JH-HLM   - Monitor gait, balance and fatigue with ambulation    Outcome: Progressing  Goal: Maintains/Returns to pre admission functional level  Description: INTERVENTIONS:  - Perform AM-PAC 6 Click Basic Mobility/ Daily Activity assessment daily.  - Set and communicate daily mobility goal to care team and patient/family/caregiver.   - Collaborate with rehabilitation services on mobility goals if consulted  - Perform Range of Motion  times a day.  - Reposition patient every  hours.  - Dangle patient  times a day  - Stand patient  times a day  - Ambulate patient  times a day  - Out of bed to chair  times a day   - Out of bed for meals  times a day  - Out of bed for toileting  - Record patient progress and toleration of activity level   Outcome: Progressing     Problem: DISCHARGE PLANNING  Goal: Discharge to home or other facility with appropriate resources  Description: INTERVENTIONS:  - Identify barriers to discharge w/patient and caregiver  - Arrange for needed discharge resources and transportation as appropriate  - Identify discharge learning needs (meds, wound care, etc.)  - Arrange for interpretive services to assist at discharge as needed  - Refer to Case Management Department for coordinating discharge planning if the patient needs post-hospital services based on physician/advanced practitioner order or complex needs related to functional status, cognitive ability, or social support system  Outcome: Progressing     Problem: Knowledge Deficit  Goal: Patient/family/caregiver demonstrates understanding of disease process, treatment plan, medications, and discharge instructions  Description: Complete learning assessment and assess knowledge base.  Interventions:  - Provide teaching at level of  understanding  - Provide teaching via preferred learning methods  Outcome: Progressing     Problem: RESPIRATORY - ADULT  Goal: Achieves optimal ventilation and oxygenation  Description: INTERVENTIONS:  - Assess for changes in respiratory status  - Assess for changes in mentation and behavior  - Position to facilitate oxygenation and minimize respiratory effort  - Oxygen administered by appropriate delivery if ordered  - Initiate smoking cessation education as indicated  - Encourage broncho-pulmonary hygiene including cough, deep breathe, Incentive Spirometry  - Assess the need for suctioning and aspirate as needed  - Assess and instruct to report SOB or any respiratory difficulty  - Respiratory Therapy support as indicated  Outcome: Progressing     Problem: SKIN/TISSUE INTEGRITY - ADULT  Goal: Skin Integrity remains intact(Skin Breakdown Prevention)  Description: Assess:  -Perform Micky assessment every   -Clean and moisturize skin every   -Inspect skin when repositioning, toileting, and assisting with ADLS  -Assess under medical devices such as  every   -Assess extremities for adequate circulation and sensation     Bed Management:  -Have minimal linens on bed & keep smooth, unwrinkled  -Change linens as needed when moist or perspiring  -Avoid sitting or lying in one position for more than  hours while in bed  -Keep HOB at degrees     Toileting:  -Offer bedside commode  -Assess for incontinence every   -Use incontinent care products after each incontinent episode such as     Activity:  -Mobilize patient  times a day  -Encourage activity and walks on unit  -Encourage or provide ROM exercises   -Turn and reposition patient every  Hours  -Use appropriate equipment to lift or move patient in bed  -Instruct/ Assist with weight shifting every  when out of bed in chair  -Consider limitation of chair time  hour intervals    Skin Care:  -Avoid use of baby powder, tape, friction and shearing, hot water or constrictive  clothing  -Relieve pressure over bony prominences using   -Do not massage red bony areas    Next Steps:  -Teach patient strategies to minimize risks such as    -Consider consults to  interdisciplinary teams such as  Outcome: Progressing

## 2025-06-13 NOTE — ASSESSMENT & PLAN NOTE
Noted to be hypertensive at time of rapid response  Has underlying HTN and received 3L crystalloid  Is s/p IV Lasix 80 mg x1 (6/10)  BP's fluctuant over the past 24 hours, with highest reading at 171/91  Continue prehospital amlodipine, lisinopril, metoprolol  Continue as needed labetalol  Monitor BP closely per protocol, will adjust management accordingly.

## 2025-06-13 NOTE — PHYSICAL THERAPY NOTE
PT Treatment Note    NAME:  Ramon Keith  DATE: 06/13/25    AGE:   74 y.o.  Mrn:   [id number]  ADMIT DX:  Diarrhea [R19.7]  Pneumonia [J18.9]  Dyspnea [R06.00]  Nausea and vomiting [R11.2]  Hypoxic [R09.02]  Fever [R50.9]  Atrial fib/flutter, transient (HCC) [I48.91, I48.92]  Performed at least 2 patient identifiers during session: Name and ID bracelet         06/13/25 1030   PT Last Visit   PT Visit Date 06/13/25   Note Type   Note Type Treatment   Pain Assessment   Pain Assessment Tool 0-10   Pain Score 5   Pain Location/Orientation Location: Head   Pain Onset/Description Descriptor: Headache   Hospital Pain Intervention(s) Medication (See MAR)   Restrictions/Precautions   Weight Bearing Precautions Per Order No   Other Precautions O2;Fall Risk;Telemetry;Multiple lines;Pain;Chair Alarm;Contact/isolation   General   Chart Reviewed Yes   Family/Caregiver Present No   Subjective   Subjective pt reports feeling hot and sweaty and dizzy and unbalanced; mobility held due to this reason   Endurance Deficit   Endurance Deficit Yes   Endurance Deficit Description reports of dizziness   Activity Tolerance   Medical Staff Made Aware CRNP made aware of session results   Exercises   Glute Sets Sitting;15 reps;AROM;Bilateral   Hip Flexion Sitting;15 reps;AROM;Bilateral   Hip Abduction Sitting;15 reps;AROM;Bilateral   Hip Adduction Sitting;15 reps;AROM;Bilateral   Knee AROM Long Arc Quad Sitting;15 reps;AROM;Bilateral   Assessment   Prognosis Fair   Assessment Pt seen for PT treatment session this date with interventions consisting of therapeutic exercise to improve endurance to improve functional mobility. Pt agreeable to PT treatment session upon arrival, pt found seated OOB in recliner, in no apparent distress. Since previous session, pt has made no progress as evidenced by  inability to tolerate mobility   Barriers during this session include dizziness and fatigue.  Pt continues to be functioning below baseline level,  and remains limited 2* factors listed above and including impaired activity tolerance, impaired  balance, decreased endurance, and decreased mobility.  Pt prognosis for achieving goals is fair, pending pt progress with hospitalization/medical status improvements, and indicated by motivated to participate in therapy and continued required assistance. PT will continue to see pt during current hospitalization in order to address the deficits listed above and provide interventions consistent w/ POC in effort to achieve goals. Current goals and POC remain appropriate, pt continues to have rehab potential  Upon conclusion pt seated OOB in recliner. The patient's AM-PAC Basic Mobility Inpatient Short Form Raw Score is 19.  A Raw score of greater than 16 suggests the patient may benefit from discharge to home. Based on patient presentations and impairments, pt would most appropriately benefit from Level 3 resource intensity upon discharge.  Please also refer to the recommendation of the Physical Therapist for safe discharge planning. RN verbalized pt appropriate for PT session.   Barriers to Discharge Inaccessible home environment   Goals   Patient Goals to feel better   LTG Expiration Date 06/21/25   PT Treatment Day 2   Plan   Treatment/Interventions Therapeutic exercise   Progress Slow progress, medical status limitations   PT Frequency 3-5x/wk   Discharge Recommendation   Rehab Resource Intensity Level, PT III (Minimum Resource Intensity)   Equipment Recommended Walker   Walker Package Recommended Wheeled walker   AM-PAC Basic Mobility Inpatient   Turning in Flat Bed Without Bedrails 4   Lying on Back to Sitting on Edge of Flat Bed Without Bedrails 3   Moving Bed to Chair 3   Standing Up From Chair Using Arms 3   Walk in Room 3   Climb 3-5 Stairs With Railing 3   Basic Mobility Inpatient Raw Score 19   Basic Mobility Standardized Score 42.48   [facility] Highest Level Of Mobility   [facility] Goal 6: Walk 10 steps or more    [facility] Achieved 2: Bed activities/Dependent transfer       Time In: 1027  Time Out: 1043  Total Treatment Minutes: 16    Charity Atkinson, PT

## 2025-06-13 NOTE — PROGRESS NOTES
Progress Note - Hospitalist   Name: Ramon Keith 74 y.o. male I MRN: [id number]  Unit/Bed#: ICU 03-01 I Date of Admission: 6/10/2025   Date of Service: 6/13/2025 I Hospital Day: 3    Assessment & Plan  Sepsis due to pneumonia (HCC)  Present on arrival to ED   AEB tachycardia, tachypnea, leukocytosis  Is s/p IV fluid resuscitation (6/10)  Workup:   CXR 6/10: Right lower lobe pneumonia  COVID/FLU/RSV negative   Blood cultures negative at 48 hours  UA w/out evidence of infection  Urine strep/legionella negative  Procalcitonin 0.95.   Patient with low-grade fever, mild but improving leukocytosis today, some tachypnea and tachycardia with exertion  Continue IV Ceftriaxone and Azithromycin. Will attempt to de-escalate tomorrow if able.   Obtain sputum culture  Repeat CBC/BMP in the AM.   Atrial fibrillation (HCC)  Is s/p successful cardioversion in past, however currently in AF (rate-controlled)  Continue home AC w/Eliquis  Continue home metoprolol for rate control  Patient had an incident of elevated heart rate yesterday a.m.- EKG revealed atrial tachycardia with rapid ventricular response heart rate 173.  Per nursing, patient had no chest pain at that time. He was receiving a.m. care at the time likely related to exertion. No further incidences reported.   Will dc telemetry  Hyperlipidemia  Continue statin  Pulmonary nodule  Imaging revealed solid 5 mm left upper lobe pulmonary nodule  Will require follow-up w/repeat imaging as OP  Acute respiratory failure with hypoxia (HCC)  Admitted 6/10 under SLIM for sepsis 2/2 RLL pneumonia w/only 2L NC requirement - rapid response called same afternoon for increased WOB/hypoxia requiring escalation to [facility].  Patient was transferred to stepdown level 1 ICU transfer  Suspect this was initially in setting of RLL pneumonia w/small R pleural effusion (likely parapneumonic), however suspect escalation in O2 related to flash pulmonary edema(see plan for same)  See also plan for  sepsis  6/12 patient stabilized and slim resumed care of patient  On 2 L nasal cannula oxygen - goal to wean oxygen as able, following respiratory protocol.   Encourage mobility, I-S, out of bed to chair  Hypertensive urgency  Noted to be hypertensive at time of rapid response  Has underlying HTN and received 3L crystalloid  Is s/p IV Lasix 80 mg x1 (6/10)  BP's fluctuant over the past 24 hours, with highest reading at 171/91  Continue prehospital amlodipine, lisinopril, metoprolol  Continue as needed labetalol  Monitor BP closely per protocol, will adjust management accordingly.   Flash pulmonary edema (HCC)  Rapid response called afternoon of 6/10 for escalating O2 requirements after receiving volume resuscitation - noted to have increased WOB/hypoxia/diffuse rales w/expiratory wheezing-patient was transferred to ICU as a level 1 stepdown  See also plan for acute respiratory failure with hypoxia  6/11 TTE LVEF 55% with normal systolic function and normal wall motion.  Right ventricular normal systolic function  Patient is not on diuretics in the outpatient setting  Is s/p IV Lasix 80 mg x1 (6/10)  Continue to monitor fluid status closely  Daily weight/I & O  Patient is -1.7 L to date, euvolemic on exam  Patient is not receiving diuretics currently  Patient is stable on 2 L nasal cannula oxygen, with goal to wean per respiratory protocol.  He is not on oxygen at baseline.  Will obtain follow-up chest x-ray today.  Adrenal nodule (HCC)  Imaging revealed 1.8 cm right adrenal nodule  Will require follow-up as OP w/either non-contrast abdomen CT or MRI in 12 months  BPH (benign prostatic hyperplasia)  Continue home Flomax  Monitor urinary output  Generalized weakness  Patient reports feeling weaker than baseline, unsteady on his feet  PT/OT initially evaluated noted patient to be minimum resource intensity-will request reevaluation today  Fall precautions  Case management continues to follow for discharge  planning    VTE Pharmacologic Prophylaxis: VTE Score: 9 High Risk (Score >/= 5) - Pharmacological DVT Prophylaxis Ordered: apixaban (Eliquis). Sequential Compression Devices Ordered.    Mobility:   Basic Mobility Inpatient Raw Score: 19  JH-HLM Goal: 6: Walk 10 steps or more  JH-HLM Achieved: 6: Walk 10 steps or more  JH-HLM Goal achieved. Continue to encourage appropriate mobility.    Patient Centered Rounds: I performed bedside rounds with nursing staff today.   Discussions with Specialists or Other Care Team Provider:/OT, nursing, case management    Education and Discussions with Family / Patient: Updated  (sister) via phone.    Current Length of Stay: 3 day(s)  Current Patient Status: Inpatient   Certification Statement: The patient will continue to require additional inpatient hospital stay due to continuing to try to wean oxygen, continuing IV antibiotics for CAP, repeat labs in the a.m., also weaker than baseline will have PT OT evaluate patient  Discharge Plan: Patient continues to improve.  Remains on 2 L nasal cannula oxygen.  Continuing IV antibiotics for CAP and will repeat labs in the morning.  Patient reports he is weaker than baseline will have PT OT reevaluate patient.  Case management continues to follow for discharge planning.    Code Status: Level 1 - Full Code    Subjective   73 yo male seen and examined today. Patient appears in no acute distress, seated in his chair and eating breakfast. However, he reports dizziness, lightheadedness, headache, racing heart, and ambulatory instability. He denies difficulty breathing, chest pain, N/V. He denies any urinary or BM issues. Patient expresses agreement to remain at hospital for further monitoring.     Objective :  Temp:  [97.3 °F (36.3 °C)-99.5 °F (37.5 °C)] 97.3 °F (36.3 °C)  HR:  [] 100  BP: (114-171)/(66-91) 158/74  Resp:  [18-44] 28  SpO2:  [92 %-96 %] 94 %  O2 Device: Nasal cannula  Nasal Cannula O2 Flow Rate (L/min):  [2  L/min] 2 L/min    Body mass index is 28.69 kg/m².     Input and Output Summary (last 24 hours):     Intake/Output Summary (Last 24 hours) at 6/13/2025 1124  Last data filed at 6/13/2025 0829  Gross per 24 hour   Intake 2380 ml   Output 2550 ml   Net -170 ml       Physical Exam  Vitals and nursing note reviewed.   Constitutional:       General: He is not in acute distress.     Appearance: He is well-developed. He is not ill-appearing.   HENT:      Head: Normocephalic and atraumatic.     Eyes:      Conjunctiva/sclera: Conjunctivae normal.       Cardiovascular:      Rate and Rhythm: Tachycardia present. Rhythm irregular.      Heart sounds: Normal heart sounds. No murmur heard.  Pulmonary:      Effort: Pulmonary effort is normal. No respiratory distress.      Comments: No respiratory distress on 2L O2 nasal cannula. Decreased breath sounds noted.   Abdominal:      Palpations: Abdomen is soft.      Tenderness: There is no abdominal tenderness. There is no guarding or rebound.     Musculoskeletal:         General: No swelling.      Cervical back: Neck supple.      Right lower leg: No edema.      Left lower leg: No edema.     Skin:     General: Skin is warm and dry.      Capillary Refill: Capillary refill takes less than 2 seconds.     Neurological:      General: No focal deficit present.      Mental Status: He is alert.     Psychiatric:         Mood and Affect: Mood normal.           Telemetry:  Telemetry Orders (From admission, onward)               24 Hour Telemetry Monitoring  Continuous x 24 Hours (Telem)        Expiring   Question:  Reason for 24 Hour Telemetry  Answer:  Decompensated CHF- and any one of the following: continuous diuretic infusion or total diuretic dose >200 mg daily, associated electrolyte derangement (I.e. K < 3.0), inotropic drip (continuous infusion), hx of ventricular arrhythmia, or new EF < 35%                     Telemetry Reviewed: Atrial fibrillation. HR averaging   Indication for  Continued Telemetry Use: No indication for continued use. Will discontinue.                Lab Results: I have reviewed the following results:   Results from last 7 days   Lab Units 06/13/25  0317 06/10/25  1623 06/10/25  0836   WBC Thousand/uL 10.97*   < > 12.59*   HEMOGLOBIN g/dL 10.8*   < > 12.3   I STAT HEMOGLOBIN   --    < >  --    HEMATOCRIT % 34.9*   < > 38.5   HEMATOCRIT, ISTAT   --    < >  --    PLATELETS Thousands/uL 230   < > 223   SEGS PCT %  --   --  86*   LYMPHO PCT %  --   --  7*   MONO PCT %  --   --  5   EOS PCT %  --   --  1    < > = values in this interval not displayed.     Results from last 7 days   Lab Units 06/13/25  0317 06/11/25  0426 06/10/25  1729   SODIUM mmol/L 137   < > 137   POTASSIUM mmol/L 3.9   < > 4.2   CHLORIDE mmol/L 101   < > 103   CO2 mmol/L 29   < > 25   BUN mg/dL 13   < > 14   CREATININE mg/dL 0.73   < > 1.05   ANION GAP mmol/L 7   < > 9   CALCIUM mg/dL 8.6   < > 8.3*   ALBUMIN g/dL  --   --  4.0   TOTAL BILIRUBIN mg/dL  --   --  1.29*   ALK PHOS U/L  --   --  54   ALT U/L  --   --  11   AST U/L  --   --  14   GLUCOSE RANDOM mg/dL 137   < > 125    < > = values in this interval not displayed.     Results from last 7 days   Lab Units 06/10/25  0836   INR  2.41*         Results from last 7 days   Lab Units 06/11/25  0426   HEMOGLOBIN A1C % 6.0*     Results from last 7 days   Lab Units 06/12/25  0505 06/11/25  0426 06/10/25  0836   LACTIC ACID mmol/L  --   --  1.0   PROCALCITONIN ng/ml 0.95* 0.91* 0.62*       Recent Cultures (last 7 days):   Results from last 7 days   Lab Units 06/10/25  1503 06/10/25  0836   BLOOD CULTURE   --  No Growth at 48 hrs.  No Growth at 48 hrs.   LEGIONELLA URINARY ANTIGEN  Negative  --        Imaging Results Review: I reviewed radiology reports from this admission including: chest xray and CT abdomen/pelvis.  Other Study Results Review: EKG was reviewed.  Other studies reviewed include: ECHO/TTE    Last 24 Hours Medication List:     Current  Facility-Administered Medications:     acetaminophen (TYLENOL) tablet 650 mg, Q6H PRN    albuterol inhalation solution 2.5 mg, Q4H PRN    amLODIPine (NORVASC) tablet 5 mg, Daily    apixaban (ELIQUIS) tablet 5 mg, BID    azithromycin (ZITHROMAX) tablet 500 mg, Q24H    ceftriaxone (ROCEPHIN) 1 g/50 mL in dextrose IVPB, Q24H, Last Rate: 1,000 mg (06/13/25 1051)    famotidine (PEPCID) tablet 20 mg, BID    flecainide (TAMBOCOR) tablet 50 mg, BID    guaiFENesin (MUCINEX) 12 hr tablet 1,200 mg, BID    labetalol (NORMODYNE) injection 10 mg, Q4H PRN    latanoprost (XALATAN) 0.005 % ophthalmic solution 1 drop, HS    lidocaine (LIDODERM) 5 % patch 1 patch, Daily    lisinopril (ZESTRIL) tablet 10 mg, Daily    metoprolol succinate (TOPROL-XL) 24 hr tablet 25 mg, Daily    multivitamin-minerals (CENTRUM) tablet 1 tablet, Daily    ondansetron (ZOFRAN) injection 4 mg, Q6H PRN    polyethylene glycol (MIRALAX) packet 17 g, Daily PRN    pravastatin (PRAVACHOL) tablet 20 mg, Daily    tamsulosin (FLOMAX) capsule 0.4 mg, Daily With Dinner    Administrative Statements   Today, Patient Was Seen By: RILEY Cage  I have spent a total time of 38 minutes in caring for this patient on the day of the visit/encounter including Patient and family education, Counseling / Coordination of care, Documenting in the medical record, Reviewing/placing orders in the medical record (including tests, medications, and/or procedures), Obtaining or reviewing history  , and Communicating with other healthcare professionals .    **Please Note: This note may have been constructed using a voice recognition system.**

## 2025-06-13 NOTE — ASSESSMENT & PLAN NOTE
Patient reports feeling weaker than baseline, unsteady on his feet  PT/OT initially evaluated noted patient to be minimum resource intensity-will request reevaluation today  Fall precautions  Case management continues to follow for discharge planning

## 2025-06-14 PROBLEM — E87.6 HYPOKALEMIA: Status: ACTIVE | Noted: 2025-06-14

## 2025-06-14 LAB
ANION GAP SERPL CALCULATED.3IONS-SCNC: 8 MMOL/L (ref 4–13)
BUN SERPL-MCNC: 11 MG/DL (ref 5–25)
CALCIUM SERPL-MCNC: 8.2 MG/DL (ref 8.4–10.2)
CHLORIDE SERPL-SCNC: 101 MMOL/L (ref 96–108)
CO2 SERPL-SCNC: 29 MMOL/L (ref 21–32)
CREAT SERPL-MCNC: 0.64 MG/DL (ref 0.6–1.3)
ERYTHROCYTE [DISTWIDTH] IN BLOOD BY AUTOMATED COUNT: 13.5 % (ref 11.6–15.1)
GFR SERPL CREATININE-BSD FRML MDRD: 95 ML/MIN/1.73SQ M
GLUCOSE SERPL-MCNC: 106 MG/DL (ref 65–140)
HCT VFR BLD AUTO: 36.8 % (ref 36.5–49.3)
HGB BLD-MCNC: 11.7 G/DL (ref 12–17)
MCH RBC QN AUTO: 30.3 PG (ref 26.8–34.3)
MCHC RBC AUTO-ENTMCNC: 31.8 G/DL (ref 31.4–37.4)
MCV RBC AUTO: 95 FL (ref 82–98)
PLATELET # BLD AUTO: 269 THOUSANDS/UL (ref 149–390)
PMV BLD AUTO: 9.6 FL (ref 8.9–12.7)
POTASSIUM SERPL-SCNC: 3.3 MMOL/L (ref 3.5–5.3)
RBC # BLD AUTO: 3.86 MILLION/UL (ref 3.88–5.62)
SODIUM SERPL-SCNC: 138 MMOL/L (ref 135–147)
WBC # BLD AUTO: 8.26 THOUSAND/UL (ref 4.31–10.16)

## 2025-06-14 PROCEDURE — 99232 SBSQ HOSP IP/OBS MODERATE 35: CPT

## 2025-06-14 PROCEDURE — 97110 THERAPEUTIC EXERCISES: CPT

## 2025-06-14 PROCEDURE — 80048 BASIC METABOLIC PNL TOTAL CA: CPT

## 2025-06-14 PROCEDURE — 94664 DEMO&/EVAL PT USE INHALER: CPT

## 2025-06-14 PROCEDURE — 97116 GAIT TRAINING THERAPY: CPT

## 2025-06-14 PROCEDURE — 85027 COMPLETE CBC AUTOMATED: CPT

## 2025-06-14 RX ORDER — POTASSIUM CHLORIDE 1500 MG/1
40 TABLET, EXTENDED RELEASE ORAL ONCE
Status: COMPLETED | OUTPATIENT
Start: 2025-06-14 | End: 2025-06-14

## 2025-06-14 RX ADMIN — GUAIFENESIN 1200 MG: 600 TABLET ORAL at 08:57

## 2025-06-14 RX ADMIN — POTASSIUM CHLORIDE 40 MEQ: 1500 TABLET, EXTENDED RELEASE ORAL at 08:57

## 2025-06-14 RX ADMIN — AMLODIPINE BESYLATE 5 MG: 5 TABLET ORAL at 08:56

## 2025-06-14 RX ADMIN — METOPROLOL SUCCINATE 25 MG: 25 TABLET, EXTENDED RELEASE ORAL at 08:57

## 2025-06-14 RX ADMIN — AZITHROMYCIN DIHYDRATE 500 MG: 250 TABLET ORAL at 08:56

## 2025-06-14 RX ADMIN — FAMOTIDINE 20 MG: 20 TABLET, FILM COATED ORAL at 08:56

## 2025-06-14 RX ADMIN — FAMOTIDINE 20 MG: 20 TABLET, FILM COATED ORAL at 17:09

## 2025-06-14 RX ADMIN — LISINOPRIL 10 MG: 10 TABLET ORAL at 08:56

## 2025-06-14 RX ADMIN — FLECAINIDE ACETATE 50 MG: 50 TABLET ORAL at 08:56

## 2025-06-14 RX ADMIN — PRAVASTATIN SODIUM 20 MG: 20 TABLET ORAL at 08:57

## 2025-06-14 RX ADMIN — TAMSULOSIN HYDROCHLORIDE 0.4 MG: 0.4 CAPSULE ORAL at 17:09

## 2025-06-14 RX ADMIN — LATANOPROST 1 DROP: 50 SOLUTION OPHTHALMIC at 22:17

## 2025-06-14 RX ADMIN — APIXABAN 5 MG: 5 TABLET, FILM COATED ORAL at 17:09

## 2025-06-14 RX ADMIN — APIXABAN 5 MG: 5 TABLET, FILM COATED ORAL at 08:56

## 2025-06-14 RX ADMIN — CEFTRIAXONE SODIUM 1000 MG: 10 INJECTION, POWDER, FOR SOLUTION INTRAVENOUS at 10:30

## 2025-06-14 RX ADMIN — Medication 1 TABLET: at 08:56

## 2025-06-14 RX ADMIN — GUAIFENESIN 1200 MG: 600 TABLET ORAL at 17:09

## 2025-06-14 RX ADMIN — FLECAINIDE ACETATE 50 MG: 50 TABLET ORAL at 17:09

## 2025-06-14 NOTE — PHYSICAL THERAPY NOTE
PT Treatment Note    NAME:  Ramon Keith  DATE: 06/14/25    AGE:   74 y.o.  Mrn:   [id number]  ADMIT DX:  Diarrhea [R19.7]  Pneumonia [J18.9]  Dyspnea [R06.00]  Nausea and vomiting [R11.2]  Hypoxic [R09.02]  Fever [R50.9]  Atrial fib/flutter, transient (HCC) [I48.91, I48.92]  Performed at least 2 patient identifiers during session: Name and ID bracelet       06/14/25 0940   PT Last Visit   PT Visit Date 06/14/25   Note Type   Note Type Treatment   Pain Assessment   Pain Assessment Tool 0-10   Pain Score No Pain   Restrictions/Precautions   Weight Bearing Precautions Per Order No   Other Precautions Chair Alarm;O2;Fall Risk;Telemetry  (1 L/min)   General   Chart Reviewed Yes   Family/Caregiver Present No   Cognition   Overall Cognitive Status WFL   Arousal/Participation Alert   Attention Within functional limits   Orientation Level Oriented X4   Memory Within functional limits   Following Commands Follows all commands and directions without difficulty   Transfers   Sit to Stand 5  Supervision   Additional items Assist x 1;Increased time required;Verbal cues;Armrests   Stand to Sit 5  Supervision   Additional items Assist x 1;Increased time required;Verbal cues;Armrests   Additional Comments pt denied dizziness with transitional movement   Ambulation/Elevation   Gait pattern Improper Weight shift;Decreased foot clearance;Short stride;Excessively slow   Gait Assistance   (CGA)   Additional items Assist x 1;Verbal cues   Assistive Device Rolling walker   Distance 90 ft   Ambulation/Elevation Additional Comments good safety awareness noted   Balance   Static Sitting Good   Dynamic Sitting Fair +   Static Standing Fair   Dynamic Standing Fair -   Ambulatory Fair -   Endurance Deficit   Endurance Deficit Yes   Endurance Deficit Description fatigue   Activity Tolerance   Activity Tolerance Patient limited by fatigue   Medical Staff Made Aware CRNP made aware of session results   Exercises   Heelslides Sitting;15  reps;AROM;Bilateral   Glute Sets Sitting;15 reps;AROM;Bilateral   Hip Flexion Sitting;15 reps;AROM;Bilateral   Hip Adduction Sitting;15 reps;AROM;Bilateral   Knee AROM Long Arc Quad Sitting;15 reps;AROM;Bilateral   Ankle Pumps Sitting;15 reps;AROM;Bilateral   Assessment   Prognosis Fair   Assessment Pt seen for PT treatment session this date with interventions consisting of gait training to normalize gait pattern to decrease fall risk and therapeutic exercise to improve endurance to improve functional mobility. Pt agreeable to PT treatment session upon arrival, pt found seated OOB in recliner, in no apparent distress. Since previous session, pt has made good progress as evidenced by increased distance ambulated and decreased assistance required with mobility  Barriers during this session include fatigue.  Pt continues to be functioning below baseline level, and remains limited 2* factors listed above and including impaired activity tolerance, impaired  balance, decreased endurance, and decreased mobility.  Pt prognosis for achieving goals is good, pending pt progress with hospitalization/medical status improvements, and indicated by motivated to participate in therapy, ability to follow cues, and improvement with mobility status. PT will continue to see pt during current hospitalization in order to address the deficits listed above and provide interventions consistent w/ POC in effort to achieve goals. Current goals and POC remain appropriate, pt continues to have rehab potential  Upon conclusion pt seated OOB in recliner. The patient's AM-PAC Basic Mobility Inpatient Short Form Raw Score is 19.  A Raw score of greater than 16 suggests the patient may benefit from discharge to home. Based on patient presentations and impairments, pt would most appropriately benefit from Level 3 resource intensity upon discharge.  Please also refer to the recommendation of the Physical Therapist for safe discharge planning. RN  verbalized pt appropriate for PT session.   Barriers to Discharge Inaccessible home environment   Goals   Patient Goals to go for a walk   LTG Expiration Date 06/21/25   PT Treatment Day 3   Plan   Treatment/Interventions Functional transfer training;Therapeutic exercise;Endurance training;Gait training;Equipment eval/education   Progress Slow progress, medical status limitations   PT Frequency 3-5x/wk   Discharge Recommendation   Rehab Resource Intensity Level, PT III (Minimum Resource Intensity)   Equipment Recommended Walker   Walker Package Recommended Wheeled walker   AM-PAC Basic Mobility Inpatient   Turning in Flat Bed Without Bedrails 4   Lying on Back to Sitting on Edge of Flat Bed Without Bedrails 3   Moving Bed to Chair 3   Standing Up From Chair Using Arms 3   Walk in Room 3   Climb 3-5 Stairs With Railing 3   Basic Mobility Inpatient Raw Score 19   Basic Mobility Standardized Score 42.48   [facility] Highest Level Of Mobility   JH-HLM Goal 6: Walk 10 steps or more   JH-HLM Achieved 7: Walk 25 feet or more       Time In: 0937  Time Out: 1003  Total Treatment Minutes: 26    Charity Atkinson, PT

## 2025-06-14 NOTE — PROGRESS NOTES
Progress Note - Hospitalist   Name: Ramon Keith 74 y.o. male I MRN: [id number]  Unit/Bed#: ICU 03-01 I Date of Admission: 6/10/2025   Date of Service: 6/14/2025 I Hospital Day: 4    Assessment & Plan  Sepsis due to pneumonia (HCC)  Present on arrival to ED   AEB tachycardia, tachypnea, leukocytosis  Is s/p IV fluid resuscitation (6/10)  Workup:   CXR 6/10: Right lower lobe pneumonia  COVID/FLU/RSV negative   Blood cultures negative after 4 days  UA w/out evidence of infection  Urine strep/legionella negative  Procalcitonin 0.95.   Follow-up chest x-ray 6/13 noted improvement in pneumonia  Patient has been weaned to 1 L oxygen today  Respiratory protocol continue to wean oxygen as able  Continue Rocephin  CBC a.m.  Atrial fibrillation (HCC)  Is s/p successful cardioversion in past, however currently in AF (rate-controlled)  Continue home AC w/Eliquis  Continue home metoprolol for rate control  Patient had an incident of elevated heart rate 6/12 AM- EKG revealed atrial tachycardia with rapid ventricular response heart rate 173.  Per nursing, patient had no chest pain at that time. He was receiving a.m. care at the time likely related to exertion. No further incidences reported.   No further episodes of heart rate acceleration  Denies dizziness lightheadedness chest pain or palpitations on exam    Hyperlipidemia  Continue statin  Pulmonary nodule  Imaging revealed solid 5 mm left upper lobe pulmonary nodule  Will require follow-up w/repeat imaging as OP  Acute respiratory failure with hypoxia (HCC)  Admitted 6/10 under SLIM for sepsis 2/2 RLL pneumonia w/only 2L NC requirement - rapid response called same afternoon for increased WOB/hypoxia requiring escalation to [facility].  Patient was transferred to stepdown level 1 ICU transfer  Suspect this was initially in setting of RLL pneumonia w/small R pleural effusion (likely parapneumonic), however suspect escalation in O2 related to flash pulmonary edema(see plan for  same)  See also plan for sepsis  6/12 patient stabilized and slim resumed care of patient  Continuing to oxygen he is down to 1 L nasal cannula.  Still little short of breath with exertion   Encourage mobility, I-S, out of bed to chair  Respiratory protocol continue to wean oxygen as able  Hypertensive urgency  Noted to be hypertensive at time of rapid response  Has underlying HTN and received 3L crystalloid  Is s/p IV Lasix 80 mg x1 (6/10)  Blood pressure is well-controlled   Continue prehospital amlodipine, lisinopril, metoprolol  Continue as needed labetalol  Monitor BP closely per protocol, will adjust management accordingly.   Flash pulmonary edema (HCC)  Rapid response called afternoon of 6/10 for escalating O2 requirements after receiving volume resuscitation - noted to have increased WOB/hypoxia/diffuse rales w/expiratory wheezing-patient was transferred to ICU as a level 1 stepdown  See also plan for acute respiratory failure with hypoxia  6/11 TTE LVEF 55% with normal systolic function and normal wall motion.  Right ventricular normal systolic function  Patient is not on diuretics in the outpatient setting  Is s/p IV Lasix 80 mg x1 (6/10)  Continue to monitor fluid status closely  Daily weight/I & O  Patient is -2.9 L to date, euvolemic on exam  Patient is not receiving diuretics currently  6/13 repeat chest x-ray:Improving right lower lobe pneumonia.   Patient further weaned to 1 L nasal cannula oxygen  Respiratory protocol continue to wean oxygen as able  Adrenal nodule (HCC)  Imaging revealed 1.8 cm right adrenal nodule  Will require follow-up as OP w/either non-contrast abdomen CT or MRI in 12 months  BPH (benign prostatic hyperplasia)  Continue home Flomax  Monitor urinary output  Generalized weakness  Patient reports feeling weaker than baseline, unsteady on his feet  PT/OT reevaluated patient today feel patient is appropriate for outpatient PT OT on discharge  Fall precautions  Case management  continues to follow for discharge planning  Hypokalemia  Continue to monitor and replete accordingly  BMP a.m.    VTE Pharmacologic Prophylaxis: VTE Score: 9 High Risk (Score >/= 5) - Pharmacological DVT Prophylaxis Ordered: apixaban (Eliquis). Sequential Compression Devices Ordered.    Mobility:   Basic Mobility Inpatient Raw Score: 19  JH-HLM Goal: 6: Walk 10 steps or more  JH-HLM Achieved: 7: Walk 25 feet or more  JH-HLM Goal achieved. Continue to encourage appropriate mobility.    Patient Centered Rounds: I performed bedside rounds with nursing staff today.   Discussions with Specialists or Other Care Team Provider: PT/OT, nursing, case management    Education and Discussions with Family / Patient: Updated  (sister) via phone.    Current Length of Stay: 4 day(s)  Current Patient Status: Inpatient   Certification Statement: The patient will continue to require additional inpatient hospital stay due to continuing to try to wean oxygen, continuing IV antibiotics for CAP, repeat labs in the a.m.  Discharge Plan: Patient was reevaluated by PT today recommend outpatient therapy.  Okay to go home when medically stable.  Patient has been further weaned to 1 L nasal cannula oxygen overnight.  Follow-up chest x-ray yesterday improving.  Will continue IV antibiotics today.  Repeat labs in AM.  Hopeful discharge home next 24 to 48 hours.    Code Status: Level 1 - Full Code    Subjective   Patient is feeling better today, less short of breath.  States he finally had a decent night sleep.    Objective :  Temp:  [97.3 °F (36.3 °C)-98 °F (36.7 °C)] 97.3 °F (36.3 °C)  HR:  [77-96] 81  BP: (134-162)/(60-73) 137/73  Resp:  [20-45] 23  SpO2:  [90 %-96 %] 96 %  O2 Device: Nasal cannula  Nasal Cannula O2 Flow Rate (L/min):  [1 L/min] 1 L/min    Body mass index is 28.66 kg/m².     Input and Output Summary (last 24 hours):     Intake/Output Summary (Last 24 hours) at 6/14/2025 2322  Last data filed at 6/14/2025  1215  Gross per 24 hour   Intake 840 ml   Output 1975 ml   Net -1135 ml       Physical Exam  Vitals reviewed.   Constitutional:       General: He is not in acute distress.     Appearance: He is well-developed. He is not ill-appearing.   HENT:      Head: Normocephalic and atraumatic.     Cardiovascular:      Rate and Rhythm: Normal rate. Rhythm irregular.      Heart sounds: Normal heart sounds. No murmur heard.  Pulmonary:      Effort: Pulmonary effort is normal. No respiratory distress.      Comments: Nonlabored respirations at rest on O2 1 L nasal cannula  Abdominal:      General: There is no distension.      Palpations: Abdomen is soft.      Tenderness: There is no abdominal tenderness. There is no guarding or rebound.     Musculoskeletal:         General: No swelling.      Right lower leg: No edema.      Left lower leg: No edema.     Skin:     General: Skin is warm and dry.      Capillary Refill: Capillary refill takes less than 2 seconds.     Neurological:      General: No focal deficit present.      Mental Status: He is alert and oriented to person, place, and time. Mental status is at baseline.     Psychiatric:         Mood and Affect: Mood normal.         Behavior: Behavior normal.         Thought Content: Thought content normal.         Judgment: Judgment normal.                        Lab Results: I have reviewed the following results:   Results from last 7 days   Lab Units 06/14/25  0426 06/10/25  1623 06/10/25  0836   WBC Thousand/uL 8.26   < > 12.59*   HEMOGLOBIN g/dL 11.7*   < > 12.3   I STAT HEMOGLOBIN   --    < >  --    HEMATOCRIT % 36.8   < > 38.5   HEMATOCRIT, ISTAT   --    < >  --    PLATELETS Thousands/uL 269   < > 223   SEGS PCT %  --   --  86*   LYMPHO PCT %  --   --  7*   MONO PCT %  --   --  5   EOS PCT %  --   --  1    < > = values in this interval not displayed.     Results from last 7 days   Lab Units 06/14/25  0426 06/11/25  0426 06/10/25  1729   SODIUM mmol/L 138   < > 137   POTASSIUM  mmol/L 3.3*   < > 4.2   CHLORIDE mmol/L 101   < > 103   CO2 mmol/L 29   < > 25   BUN mg/dL 11   < > 14   CREATININE mg/dL 0.64   < > 1.05   ANION GAP mmol/L 8   < > 9   CALCIUM mg/dL 8.2*   < > 8.3*   ALBUMIN g/dL  --   --  4.0   TOTAL BILIRUBIN mg/dL  --   --  1.29*   ALK PHOS U/L  --   --  54   ALT U/L  --   --  11   AST U/L  --   --  14   GLUCOSE RANDOM mg/dL 106   < > 125    < > = values in this interval not displayed.     Results from last 7 days   Lab Units 06/10/25  0836   INR  2.41*         Results from last 7 days   Lab Units 06/11/25  0426   HEMOGLOBIN A1C % 6.0*     Results from last 7 days   Lab Units 06/12/25  0505 06/11/25  0426 06/10/25  0836   LACTIC ACID mmol/L  --   --  1.0   PROCALCITONIN ng/ml 0.95* 0.91* 0.62*       Recent Cultures (last 7 days):   Results from last 7 days   Lab Units 06/10/25  1503 06/10/25  0836   BLOOD CULTURE   --  No Growth After 4 Days.  No Growth After 4 Days.   LEGIONELLA URINARY ANTIGEN  Negative  --        Imaging Results Review: I reviewed radiology reports from this admission including: chest xray and CT abdomen/pelvis.  Other Study Results Review: EKG was reviewed.  Other studies reviewed include: ECHO/TTE    Last 24 Hours Medication List:     Current Facility-Administered Medications:     acetaminophen (TYLENOL) tablet 650 mg, Q6H PRN    albuterol inhalation solution 2.5 mg, Q4H PRN    amLODIPine (NORVASC) tablet 5 mg, Daily    apixaban (ELIQUIS) tablet 5 mg, BID    ceftriaxone (ROCEPHIN) 1 g/50 mL in dextrose IVPB, Q24H, Last Rate: 1,000 mg (06/14/25 1030)    famotidine (PEPCID) tablet 20 mg, BID    flecainide (TAMBOCOR) tablet 50 mg, BID    guaiFENesin (MUCINEX) 12 hr tablet 1,200 mg, BID    labetalol (NORMODYNE) injection 10 mg, Q4H PRN    latanoprost (XALATAN) 0.005 % ophthalmic solution 1 drop, HS    lidocaine (LIDODERM) 5 % patch 1 patch, Daily    lisinopril (ZESTRIL) tablet 10 mg, Daily    metoprolol succinate (TOPROL-XL) 24 hr tablet 25 mg, Daily     multivitamin-minerals (CENTRUM) tablet 1 tablet, Daily    ondansetron (ZOFRAN) injection 4 mg, Q6H PRN    polyethylene glycol (MIRALAX) packet 17 g, Daily PRN    pravastatin (PRAVACHOL) tablet 20 mg, Daily    tamsulosin (FLOMAX) capsule 0.4 mg, Daily With Dinner    Administrative Statements   Today, Patient Was Seen By: RILEY Cage  I have spent a total time of 36 minutes in caring for this patient on the day of the visit/encounter including Patient and family education, Counseling / Coordination of care, Documenting in the medical record, Reviewing/placing orders in the medical record (including tests, medications, and/or procedures), Obtaining or reviewing history  , and Communicating with other healthcare professionals .    **Please Note: This note may have been constructed using a voice recognition system.**

## 2025-06-14 NOTE — ASSESSMENT & PLAN NOTE
Is s/p successful cardioversion in past, however currently in AF (rate-controlled)  Continue home AC w/Eliquis  Continue home metoprolol for rate control  Patient had an incident of elevated heart rate 6/12 AM- EKG revealed atrial tachycardia with rapid ventricular response heart rate 173.  Per nursing, patient had no chest pain at that time. He was receiving a.m. care at the time likely related to exertion. No further incidences reported.   No further episodes of heart rate acceleration  Denies dizziness lightheadedness chest pain or palpitations on exam

## 2025-06-14 NOTE — ASSESSMENT & PLAN NOTE
Patient reports feeling weaker than baseline, unsteady on his feet  PT/OT reevaluated patient today feel patient is appropriate for outpatient PT OT on discharge  Fall precautions  Case management continues to follow for discharge planning

## 2025-06-14 NOTE — PLAN OF CARE
Problem: Potential for Falls  Goal: Patient will remain free of falls  Description: INTERVENTIONS:  - Educate patient/family on patient safety including physical limitations  - Instruct patient to call for assistance with activity   - Consider consulting OT/PT to assist with strengthening/mobility based on AM PAC & JH-HLM score  - Consult OT/PT to assist with strengthening/mobility   - Keep Call bell within reach  - Keep bed low and locked with side rails adjusted as appropriate  - Keep care items and personal belongings within reach  - Initiate and maintain comfort rounds  - Make Fall Risk Sign visible to staff  - Offer Toileting every 2 Hours, in advance of need  - Initiate/Maintain bed alarm  - Obtain necessary fall risk management equipment  - Apply yellow socks and bracelet for high fall risk patients  - Consider moving patient to room near nurses station  Outcome: Progressing     Problem: PAIN - ADULT  Goal: Verbalizes/displays adequate comfort level or baseline comfort level  Description: Interventions:  - Encourage patient to monitor pain and request assistance  - Assess pain using appropriate pain scale  - Administer analgesics as ordered based on type and severity of pain and evaluate response  - Implement non-pharmacological measures as appropriate and evaluate response  - Consider cultural and social influences on pain and pain management  - Notify physician/advanced practitioner if interventions unsuccessful or patient reports new pain  - Educate patient/family on pain management process including their role and importance of  reporting pain   - Provide non-pharmacologic/complimentary pain relief interventions  Outcome: Progressing     Problem: INFECTION - ADULT  Goal: Absence or prevention of progression during hospitalization  Description: INTERVENTIONS:  - Assess and monitor for signs and symptoms of infection  - Monitor lab/diagnostic results  - Monitor all insertion sites, i.e. indwelling lines,  tubes, and drains  - Monitor endotracheal if appropriate and nasal secretions for changes in amount and color  - [place] appropriate cooling/warming therapies per order  - Administer medications as ordered  - Instruct and encourage patient and family to use good hand hygiene technique  - Identify and instruct in appropriate isolation precautions for identified infection/condition  Outcome: Progressing     Problem: SAFETY ADULT  Goal: Patient will remain free of falls  Description: INTERVENTIONS:  - Educate patient/family on patient safety including physical limitations  - Instruct patient to call for assistance with activity   - Consider consulting OT/PT to assist with strengthening/mobility based on AM PAC & -HLM score  - Consult OT/PT to assist with strengthening/mobility   - Keep Call bell within reach  - Keep bed low and locked with side rails adjusted as appropriate  - Keep care items and personal belongings within reach  - Initiate and maintain comfort rounds  - Make Fall Risk Sign visible to staff  - Offer Toileting every 2 Hours, in advance of need  - Initiate/Maintain bed alarm  - Obtain necessary fall risk management equipment  - Apply yellow socks and bracelet for high fall risk patients  - Consider moving patient to room near nurses station  Outcome: Progressing  Goal: Maintain or return to baseline ADL function  Description: INTERVENTIONS:  -  Assess patient's ability to carry out ADLs; assess patient's baseline for ADL function and identify physical deficits which impact ability to perform ADLs (bathing, care of mouth/teeth, toileting, grooming, dressing, etc.)  - Assess/evaluate cause of self-care deficits   - Assess range of motion  - Assess patient's mobility; develop plan if impaired  - Assess patient's need for assistive devices and provide as appropriate  - Encourage maximum independence but intervene and supervise when necessary  - Involve family in performance of ADLs  - Assess for home  care needs following discharge   - Consider OT consult to assist with ADL evaluation and planning for discharge  - Provide patient education as appropriate  - Monitor functional capacity and physical performance, use of AM PAC & JH-HLM   - Monitor gait, balance and fatigue with ambulation    Outcome: Progressing  Goal: Maintains/Returns to pre admission functional level  Description: INTERVENTIONS:  - Perform AM-PAC 6 Click Basic Mobility/ Daily Activity assessment daily.  - Set and communicate daily mobility goal to care team and patient/family/caregiver.   - Collaborate with rehabilitation services on mobility goals if consulted  - Perform Range of Motion 3 times a day.  - Reposition patient every 2 hours.  - Dangle patient 3 times a day  - Stand patient 3 times a day  - Ambulate patient 3 times a day  - Out of bed to chair 3 times a day   - Out of bed for meals 3 times a day  - Out of bed for toileting  - Record patient progress and toleration of activity level   Outcome: Progressing     Problem: DISCHARGE PLANNING  Goal: Discharge to home or other facility with appropriate resources  Description: INTERVENTIONS:  - Identify barriers to discharge w/patient and caregiver  - Arrange for needed discharge resources and transportation as appropriate  - Identify discharge learning needs (meds, wound care, etc.)  - Arrange for interpretive services to assist at discharge as needed  - Refer to Case Management Department for coordinating discharge planning if the patient needs post-hospital services based on physician/advanced practitioner order or complex needs related to functional status, cognitive ability, or social support system  Outcome: Progressing     Problem: Knowledge Deficit  Goal: Patient/family/caregiver demonstrates understanding of disease process, treatment plan, medications, and discharge instructions  Description: Complete learning assessment and assess knowledge base.  Interventions:  - Provide teaching  at level of understanding  - Provide teaching via preferred learning methods  Outcome: Progressing     Problem: RESPIRATORY - ADULT  Goal: Achieves optimal ventilation and oxygenation  Description: INTERVENTIONS:  - Assess for changes in respiratory status  - Assess for changes in mentation and behavior  - Position to facilitate oxygenation and minimize respiratory effort  - Oxygen administered by appropriate delivery if ordered  - Initiate smoking cessation education as indicated  - Encourage broncho-pulmonary hygiene including cough, deep breathe, Incentive Spirometry  - Assess the need for suctioning and aspirate as needed  - Assess and instruct to report SOB or any respiratory difficulty  - Respiratory Therapy support as indicated  Outcome: Progressing     Problem: SKIN/TISSUE INTEGRITY - ADULT  Goal: Skin Integrity remains intact(Skin Breakdown Prevention)  Description: Assess:  -Perform Micky assessment   -Clean and moisturize skin   -Inspect skin when repositioning, toileting, and assisting with ADLS  -Assess under medical devices  -Assess extremities for adequate circulation and sensation     Bed Management:  -Have minimal linens on bed & keep smooth, unwrinkled  -Change linens as needed when moist or perspiring  -Avoid sitting or lying in one position for more than 2 hours while in bed  -Keep HOB at 30 degrees     Toileting:  -Offer bedside commode  -Assess for incontinence  -Use incontinent care products after each incontinent episode    Activity:  -Mobilize patient 3 times a day  -Encourage activity and walks on unit  -Encourage or provide ROM exercises   -Turn and reposition patient every 2 Hours  -Use appropriate equipment to lift or move patient in bed  -Instruct/ Assist with weight shifting when out of bed in chair  -Consider limitation of chair time 2 hour intervals    Skin Care:  -Avoid use of baby powder, tape, friction and shearing, hot water or constrictive clothing  -Relieve pressure over bony  prominences  -Do not massage red bony areas    Next Steps:  -Teach patient strategies to minimize risks   -Consider consults to  interdisciplinary teams   Outcome: Progressing

## 2025-06-14 NOTE — ASSESSMENT & PLAN NOTE
Noted to be hypertensive at time of rapid response  Has underlying HTN and received 3L crystalloid  Is s/p IV Lasix 80 mg x1 (6/10)  Blood pressure is well-controlled   Continue prehospital amlodipine, lisinopril, metoprolol  Continue as needed labetalol  Monitor BP closely per protocol, will adjust management accordingly.

## 2025-06-14 NOTE — UTILIZATION REVIEW
Mj Perez, RN   Registered Nurse  Specialty: Utilization Review     Utilization Review      Signed     Date of Service: 6/12/2025  2:30 PM     Signed       Expand All Collapse All    Continued Stay Review     Date: 6/12/2025                           Current Patient Class: Inpatient                   Current Level of Care:  Med Surg      HPI:74 y.o. male initially admitted on 6/10/2025   Current Diagnosis: Sepsis due to pneumonia, Atrial fibrillation, Hyperlipidemia. Pulmonary nodule, Hypertensive Urgency       Assessment/Plan:  6/12 Pt continues with treatment of CAP with IV antibiotics, monitoring fluid status closely,attempting to wean oxygen, currently at 2 L NC.    Afib rate averaging 80's, overnight he had an episode of RVR with , He was receiving AM care at the time, likely related to exertion.         Medications:   Scheduled Medications:  amLODIPine, 5 mg, Oral, Daily  apixaban, 5 mg, Oral, BID  azithromycin, 500 mg, Oral, Q24H  cefTRIAXone, 1,000 mg, Intravenous, Q24H  famotidine, 20 mg, Oral, BID  flecainide, 50 mg, Oral, BID  guaiFENesin, 1,200 mg, Oral, BID  latanoprost, 1 drop, Both Eyes, HS  lidocaine, 1 patch, Topical, Daily  lisinopril, 10 mg, Oral, Daily  metoprolol succinate, 25 mg, Oral, Daily  multivitamin-minerals, 1 tablet, Oral, Daily  pravastatin, 20 mg, Oral, Daily  tamsulosin, 0.4 mg, Oral, Daily With Dinner        Continuous IV Infusions:  Infusions Meds - Displays dose, route, & frequency only         PRN Meds:  acetaminophen, 650 mg, Oral, Q6H PRN-6/11 x2- 6/12 x1   albuterol, 2.5 mg, Nebulization, Q4H PRN  labetalol, 10 mg, Intravenous, Q4H PRN  ondansetron, 4 mg, Intravenous, Q6H PRN  polyethylene glycol, 17 g, Oral, Daily PRN        Discharge Plan:  TBD      Vital Signs (last 3 days)         Date/Time Temp Pulse Resp BP MAP (mmHg) SpO2 Calculated FIO2 (%) - Nasal Cannula O2 Flow Rate (L/min) Nasal Cannula O2 Flow Rate (L/min) O2 Device Patient Position - Orthostatic  VS Dane Coma Scale Score Pain     06/12/25 1200 -- 85 24 145/71 100 95 % -- -- -- -- -- -- --     06/12/25 1142 97.8 °F (36.6 °C) 83 31 136/66 92 95 % -- -- -- Nasal cannula Sitting -- --     06/12/25 0820 -- -- -- -- -- -- -- -- -- -- -- -- 2     06/12/25 0800 -- 86 22 164/77 110 95 % -- -- -- -- -- 15 --     06/12/25 0759 -- -- -- -- -- 95 % 28 -- 2 L/min Nasal cannula -- -- --     06/12/25 0730 97.2 °F (36.2 °C) 79 25 102/52 74 97 % 28 -- 2 L/min Nasal cannula Lying -- No Pain     06/12/25 0600 -- 78 31 120/63 85 97 % -- -- -- -- -- -- --     06/12/25 0500 96.9 °F (36.1 °C) 84 33 -- -- 98 % -- -- -- -- -- -- --     06/12/25 0400 -- 74 22 97/53 70 98 % -- -- -- -- -- -- --     06/12/25 0300 -- 77 25 -- -- 97 % -- -- -- -- -- -- --     06/12/25 0200 -- 81 27 107/54 78 97 % -- -- -- -- -- -- --     06/12/25 0101 -- 92 32 -- -- 91 % -- -- -- -- -- -- --     06/12/25 0100 -- 91 35 -- -- 92 % -- -- -- -- -- -- --     06/12/25 0001 -- 109 30 189/81 117 88 % -- -- -- -- -- -- 3     06/11/25 2320 -- 114 42 205/88 127 92 % -- -- -- -- -- -- --     06/11/25 2300 -- 112 37 193/88 126 90 % -- -- -- -- -- -- --     06/11/25 2200 -- 105 30 139/78 104 91 % -- -- -- -- -- -- --     06/11/25 2100 -- 87 32 136/61 88 93 % -- -- -- -- -- -- --     06/11/25 2027 -- -- 18 -- -- -- 28 -- 2 L/min Nasal cannula -- -- --     06/11/25 2000 -- 87 33 -- -- 94 % -- -- -- -- -- 15 No Pain     06/11/25 1916 98.1 °F (36.7 °C) -- -- -- -- -- -- -- -- -- -- -- --     06/11/25 1900 -- 83 28 98/57 75 93 % -- -- -- -- -- -- --     06/11/25 1800 -- 93 26 142/66 95 93 % 28 -- 2 L/min Nasal cannula -- -- --     06/11/25 1700 -- 99 26 136/65 92 93 % -- -- -- Nasal cannula -- -- --     06/11/25 1600 99.9 °F (37.7 °C) 97 22 142/77 99 95 % -- -- -- None (Room air) Lying 15 --     06/11/25 1400 -- 98 22 144/64 92 94 % -- -- -- -- -- -- --     06/11/25 1309 -- 95 24 153/72 104 96 % 28 -- 2 L/min  Nasal cannula -- -- --     06/11/25 1300 -- 98 20 153/72 104  95 % -- -- -- -- -- -- --     06/11/25 1200 -- -- -- -- -- -- -- -- -- -- -- 15 --     06/11/25 0924 -- -- -- -- -- -- -- -- -- -- -- -- 3     06/11/25 0908 -- -- -- -- -- -- -- -- -- -- -- -- 3     06/11/25 0900 -- 97 24 127/57 82 96 % -- -- -- -- -- -- --     06/11/25 0856 -- -- -- -- -- -- 32 -- 3 L/min  Nasal cannula -- -- --     06/11/25 0800 -- -- -- -- -- -- 40 -- 5 L/min -- -- 15 --     06/11/25 0731 -- -- -- -- -- -- -- -- -- -- -- -- 3     06/11/25 0723 -- 76 -- 128/80 -- 96 % 40 -- 5 L/min  Nasal cannula  -- -- --     06/11/25 0700 98 °F (36.7 °C) 88 25 157/72 104 98 % -- -- -- -- Lying -- --     06/11/25 0424 -- -- -- -- -- -- -- -- -- -- -- -- 6 06/11/25 0400 98.5 °F (36.9 °C) 103 22 157/75 108 95 % 44 -- 6 L/min Mid flow nasal cannula Lying -- No Pain     06/11/25 0202 -- -- -- -- -- -- 44 6 L/min 6 L/min  Mid flow nasal cannula -- -- --     06/11/25 0000 99 °F (37.2 °C) 96 24 145/78 106 97 % 52 -- 8 L/min Mid flow nasal cannula Lying 15 No Pain     06/10/25 2200 -- 85 24 143/67 97 99 % -- -- -- -- -- -- --     06/10/25 2100 -- 90 22 122/58 84 97 % 52 8 L/min 8 L/min Mid flow nasal cannula -- -- --     06/10/25 2000 -- 88 20 136/68 94 96 % 52 -- 8 L/min Mid flow nasal cannula Lying 15 No Pain     06/10/25 1933 99.9 °F (37.7 °C) -- -- -- -- -- -- -- -- -- -- -- --     06/10/25 1832 -- -- -- -- -- 97 % 52 -- 8 L/min  Mid flow nasal cannula -- -- --     06/10/25 1821 -- -- -- -- -- 99 % 60 -- 10 L/min  Mid flow nasal cannula -- -- --     06/10/25 1807 -- -- -- -- -- 100 % 72 -- 13 L/min  Mid flow nasal cannula -- -- --     06/10/25 1649 -- 104 46 150/70 101 97 % -- -- -- Mid flow nasal cannula -- 15 No Pain     06/10/25 1644 99 °F (37.2 °C) 109 51 173/75 107 94 % -- -- -- Mid flow nasal cannula -- -- --     06/10/25 1639 -- -- -- -- -- -- 80 -- 15 L/min Mid flow nasal cannula -- -- --     06/10/25 1617 -- 98 26 163/77 -- 97 % -- -- -- Non-rebreather mask Sitting -- --     06/10/25 1616 -- -- -- -- --  -- -- -- -- -- -- 15 --     06/10/25 1615 -- 96 28 160/74 106 98 % 80 -- 15 L/min Non-rebreather mask -- -- --     06/10/25 1600 -- 100 32 173/80 115 84 % 28 -- 2 L/min Nasal cannula -- -- --     06/10/25 1530 -- 87 24 176/77 110 92 % 28 -- 2 L/min Nasal cannula -- -- --     06/10/25 1415 -- 77 20 110/59 81 95 % 28 -- 2 L/min Nasal cannula -- -- --     06/10/25 1345 -- 76 22 117/58 83 94 % 28 -- 2 L/min Nasal cannula -- -- --     06/10/25 1330 -- 78 22 114/59 80 95 % 28 -- 2 L/min Nasal cannula -- -- --     06/10/25 1315 -- 82 20 108/55 76 93 % 28 -- 2 L/min Nasal cannula -- -- --     06/10/25 1300 -- 81 22 111/58 78 91 % 28 -- 2 L/min Nasal cannula -- -- --     06/10/25 1245 -- 79 22 111/55 77 93 % 28 -- 2 L/min Nasal cannula -- -- --     06/10/25 1215 -- 81 20 122/61 83 93 % 28 -- 2 L/min Nasal cannula -- -- --     06/10/25 1200 -- 81 -- 118/56 80 93 % 28 -- 2 L/min Nasal cannula -- -- --     06/10/25 1145 97.6 °F (36.4 °C) 83 22 121/60 84 94 % 28 -- 2 L/min Nasal cannula -- -- --     06/10/25 1130 -- 84 22 125/63 88 92 % 28 -- 2 L/min Nasal cannula -- -- --     06/10/25 1115 -- 85 22 140/68 97 92 % 28 -- 2 L/min Nasal cannula -- -- --     06/10/25 1100 -- 86 20 137/65 93 93 % 28 -- 2 L/min Nasal cannula -- -- --     06/10/25 1059 -- 87 -- 148/69 99 91 % -- -- -- -- -- -- --     06/10/25 1044 -- 89 -- 147/65 93 90 % -- -- -- -- -- -- --     06/10/25 1029 -- 82 -- 138/66 95 91 % -- -- -- -- -- -- --     06/10/25 1015 -- 82 -- 125/60 -- 92 % -- -- -- -- -- -- --     06/10/25 1014 -- -- -- -- -- -- -- -- -- -- -- 15 --     06/10/25 1010 -- 83 -- 125/60 87 93 % -- -- -- -- -- -- --     06/10/25 1004 -- 83 -- 123/59 85 93 % -- -- -- -- -- -- --     06/10/25 0954 -- 95 -- 137/64 92 90 % -- -- -- -- -- -- --     06/10/25 0901 -- 89 -- 139/67 96 95 % -- -- -- -- -- -- --     06/10/25 0851 -- 94 -- 145/74 102 94 % -- -- -- -- -- -- --     06/10/25 0845 -- 94 -- 152/70 100 93 % -- -- -- -- -- -- --     06/10/25 0814 100.2  °F (37.9 °C) 104 24 143/64 -- 90 % -- -- -- None (Room air) Sitting -- 6             Weight (last 2 days)         Date/Time Weight     06/12/25 0500 93.3 (205.69)     06/11/25 0723 94.8 (209)     06/11/25 0600 95.1 (209.66)     06/10/25 1649 94.6 (208.56)     06/10/25 0814 95.7 (211)                Pertinent Labs/Diagnostic Results:   Radiology:  XR chest portable ICU   Final Interpretation by Mery Clark MD (06/11 0807)       Improving right lower lobe pneumonia.               Workstation performed: [id number]           CT chest abdomen pelvis w contrast   Final Interpretation by Saran Kiran MD (06/10 1051)       1. Significant right lower lobe pneumonia and small right pleural effusion. Mediastinal and hilar lymph nodes are likely reactive. Follow-up noncontrast chest CT is recommended in 3 months.       2. Solid 5 mm left upper lobe pulmonary nodule. The nodule will be reassessed on the aforementioned CT.       3. 1.8 cm right adrenal nodule. Although its imaging features are indeterminate, it does not have suspicious imaging features (heterogeneity, necrosis, irregular margins), therefore this is likely benign, and can be followed by non-contrast abdomen CT or    MRI in 12 months. Please note that for adrenal nodule > 1 cm,  biochemical evaluation is suggested to rule out functioning adenoma, if not already performed. Considerations related to the patient's age and/or comorbidities may be used to alter these    recommendations.       The study was marked in EPIC for immediate notification.       Resident: ZEE MALAVE I, the attending radiologist, have reviewed the images and agree with the final report above.       Workstation performed: [id number]           XR chest 1 view portable   ED Interpretation by Brody Riggs MD (06/10 1058)   Right side Pneumonia        Final Interpretation by Khris Gonzalez MD (06/10 1615)       Right lower lobe pneumonia.               Workstation performed:  [id number]              Cardiology:  ECG 12 lead   Final Result by Lane Menchaca MD (06/12 0853)   Atrial tachycardia with rapid ventricular response   Left axis deviation   Low voltage QRS   Nonspecific ST-t wave changes   When compared with ECG of 10-Nain-2025 17:59,   Vent. rate has increased by  79 bpm       Confirmed by Lane Menchaca ([id number]) on 6/12/2025 8:53:38 AM       Echo complete w/ contrast if indicated   Final Result by Lane Menchaca MD (06/11 0832)         Left Ventricle: Left ventricular cavity size is normal. Wall thickness    is normal. The left ventricular ejection fraction is 55%. Systolic    function is normal. Wall motion is normal.     Right Ventricle: Right ventricular cavity size is normal. Systolic    function is normal.     Left Atrium: The atrium is dilated.           ECG 12 lead   Final Result by Lane Menchaca MD (06/11 0836)   Atrial fibrillation   Controlled ventricular response   Nonspecific ST-t wave changes   when compared to  10-Nain-2025 08:24,   No significant change was found       Confirmed by Lane Menchaca ([id number]) on 6/11/2025 8:36:01 AM       ECG 12 lead   Final Result by Lane Menchaca MD (06/10 1104)   Atrial fibrillation   Rightward axis   Intraventricular conduction delay   When compared with ECG of 02-Jan-2023 14:14,   Vent. rate has increased by  35 bpm   Confirmed by Lane Menchaca ([id number]) on 6/10/2025 11:04:09 AM          GI:  No orders to display              Results from last 7 days   Lab Units 06/10/25  0836   SARS-COV-2   Negative              Results from last 7 days   Lab Units 06/12/25  0505 06/11/25  0426 06/10/25  1623 06/10/25  0836   WBC Thousand/uL 12.37* 12.38*  --  12.59*   HEMOGLOBIN g/dL 11.9* 11.5*  --  12.3   I STAT HEMOGLOBIN g/dl  --   --  12.2  --    HEMATOCRIT % 37.6 36.4*  --  38.5   HEMATOCRIT, ISTAT %  --   --  36*  --    PLATELETS Thousands/uL 229 198  --  223   TOTAL NEUT ABS Thousands/µL  --   --   --  10.89*                    Results from last 7 days   Lab Units 06/12/25  0505 06/11/25  0426 06/10/25  1729 06/10/25  1623 06/10/25  0836   SODIUM mmol/L 136 135 137  --  131*   POTASSIUM mmol/L 4.3 3.9 4.2  --  4.0   CHLORIDE mmol/L 100 101 103  --  98   CO2 mmol/L 28 25 25  --  26   CO2, I-STAT mmol/L  --   --   --  22  --    ANION GAP mmol/L 8 9 9  --  7   BUN mg/dL 15 15 14  --  15   CREATININE mg/dL 0.82 1.01 1.05  --  1.05   EGFR ml/min/1.73sq m 85 71 68  --  68   CALCIUM mg/dL 9.0 8.5 8.3*  --  8.7   CALCIUM, IONIZED mmol/L 1.08* 1.06* 1.01*  --   --    CALCIUM, IONIZED, ISTAT mmol/L  --   --   --  1.16  --    MAGNESIUM mg/dL 2.3 1.8* 1.9  --   --    PHOSPHORUS mg/dL 3.3 2.4 3.1  --   --             Results from last 7 days   Lab Units 06/10/25  1729 06/10/25  0836   AST U/L 14 14   ALT U/L 11 10   ALK PHOS U/L 54 55   TOTAL PROTEIN g/dL 6.8 6.8   ALBUMIN g/dL 4.0 4.1   TOTAL BILIRUBIN mg/dL 1.29* 1.59*                  Results from last 7 days   Lab Units 06/12/25  0505 06/11/25  0426 06/10/25  1729 06/10/25  0836   GLUCOSE RANDOM mg/dL 131 126 125 125               Results from last 7 days   Lab Units 06/11/25  0426   HEMOGLOBIN A1C % 6.0*   EAG mg/dl 126              Results from last 7 days   Lab Units 06/10/25  1623   I STAT BASE EXC mmol/L -5*   I STAT O2 SAT % 98*   ISTAT PH ART   7.302*   I STAT ART PCO2 mm HG 42.3   I STAT ART PO2 mm .0   I STAT ART HCO3 mmol/L 20.9*               Results from last 7 days   Lab Units 06/10/25  1729   HS TNI 0HR ng/L 16               Results from last 7 days   Lab Units 06/10/25  0836   PROTIME seconds 26.5*   INR   2.41*   PTT seconds 49*                 Results from last 7 days   Lab Units 06/12/25  0505 06/11/25  0426 06/10/25  0836   PROCALCITONIN ng/ml 0.95* 0.91* 0.62*           Results from last 7 days   Lab Units 06/10/25  0836   LACTIC ACID mmol/L 1.0           Results from last 7 days   Lab Units 06/10/25  1504   BNP pg/mL 409*              Results from last 7 days   Lab  Units 06/10/25  0957   CLARITY UA   Clear   COLOR UA   Yellow   SPEC GRAV UA   1.010   PH UA   6.0   GLUCOSE UA mg/dl Negative   KETONES UA mg/dl 15 (1+)*   BLOOD UA   Negative   PROTEIN UA mg/dl Trace*   NITRITE UA   Negative   BILIRUBIN UA   Negative   UROBILINOGEN UA E.U./dl 0.2   LEUKOCYTES UA   Negative   WBC UA /hpf 0-1   RBC UA /hpf None Seen   BACTERIA UA /hpf Occasional   EPITHELIAL CELLS WET PREP /hpf Occasional            Results from last 7 days   Lab Units 06/10/25  1503 06/10/25  0836   STREP PNEUMONIAE ANTIGEN, URINE   Negative  --    LEGIONELLA URINARY ANTIGEN   Negative  --    INFLUENZA A PCR    --  Negative   INFLUENZA B PCR    --  Negative   RSV PCR    --  Negative              Results from last 7 days   Lab Units 06/10/25  0836   BLOOD CULTURE   No Growth at 48 hrs.  No Growth at 48 hrs.            Network Utilization Review Department  ATTENTION: Please call with any questions or concerns to [phone] and carefully listen to the prompts so that you are directed to the right person. All voicemails are confidential.   For Discharge needs, contact Care Management DC Support Team at [phone] opt. [phone]  Send all requests for admission clinical reviews, approved or denied determinations and any other requests to dedicated fax number below belonging to the campus where the patient is receiving treatment. List of dedicated fax numbers for the Facilities:  FACILITY NAME UR FAX NUMBER   ADMISSION DENIALS (Administrative/Medical Necessity) [phone]   DISCHARGE SUPPORT TEAM (NETWORK) [phone]   PARENT CHILD HEALTH (Maternity/NICU/Pediatrics) [phone]   [facility] [phone]   [facility] [phone]   [facility] [phone]   [facility] [phone]   [facility] [phone]   [facility] [phone]   [facility]  [place] [phone]   [facility] [phone]   [facility] [phone]   On license of [facility] [phone]   [facility] [phone]   [facility] [phone]

## 2025-06-14 NOTE — ASSESSMENT & PLAN NOTE
Present on arrival to ED   AEB tachycardia, tachypnea, leukocytosis  Is s/p IV fluid resuscitation (6/10)  Workup:   CXR 6/10: Right lower lobe pneumonia  COVID/FLU/RSV negative   Blood cultures negative after 4 days  UA w/out evidence of infection  Urine strep/legionella negative  Procalcitonin 0.95.   Follow-up chest x-ray 6/13 noted improvement in pneumonia  Patient has been weaned to 1 L oxygen today  Respiratory protocol continue to wean oxygen as able  Continue Rocephin  CBC a.m.

## 2025-06-14 NOTE — PLAN OF CARE
Problem: Potential for Falls  Goal: Patient will remain free of falls  Description: INTERVENTIONS:  - Educate patient/family on patient safety including physical limitations  - Instruct patient to call for assistance with activity   - Consider consulting OT/PT to assist with strengthening/mobility based on AM PAC & JH-HLM score  - Consult OT/PT to assist with strengthening/mobility   - Keep Call bell within reach  - Keep bed low and locked with side rails adjusted as appropriate  - Keep care items and personal belongings within reach  - Initiate and maintain comfort rounds  - Make Fall Risk Sign visible to staff  - Offer Toileting every 2 Hours, in advance of need  - Initiate/Maintain bed alarm  - Obtain necessary fall risk management equipment  - Apply yellow socks and bracelet for high fall risk patients  - Consider moving patient to room near nurses station  Outcome: Progressing     Problem: PAIN - ADULT  Goal: Verbalizes/displays adequate comfort level or baseline comfort level  Description: Interventions:  - Encourage patient to monitor pain and request assistance  - Assess pain using appropriate pain scale  - Administer analgesics as ordered based on type and severity of pain and evaluate response  - Implement non-pharmacological measures as appropriate and evaluate response  - Consider cultural and social influences on pain and pain management  - Notify physician/advanced practitioner if interventions unsuccessful or patient reports new pain  - Educate patient/family on pain management process including their role and importance of  reporting pain   - Provide non-pharmacologic/complimentary pain relief interventions  Outcome: Progressing     Problem: INFECTION - ADULT  Goal: Absence or prevention of progression during hospitalization  Description: INTERVENTIONS:  - Assess and monitor for signs and symptoms of infection  - Monitor lab/diagnostic results  - Monitor all insertion sites, i.e. indwelling lines,  tubes, and drains  - Monitor endotracheal if appropriate and nasal secretions for changes in amount and color  - [place] appropriate cooling/warming therapies per order  - Administer medications as ordered  - Instruct and encourage patient and family to use good hand hygiene technique  - Identify and instruct in appropriate isolation precautions for identified infection/condition  Outcome: Progressing     Problem: SAFETY ADULT  Goal: Patient will remain free of falls  Description: INTERVENTIONS:  - Educate patient/family on patient safety including physical limitations  - Instruct patient to call for assistance with activity   - Consider consulting OT/PT to assist with strengthening/mobility based on AM PAC & -HLM score  - Consult OT/PT to assist with strengthening/mobility   - Keep Call bell within reach  - Keep bed low and locked with side rails adjusted as appropriate  - Keep care items and personal belongings within reach  - Initiate and maintain comfort rounds  - Make Fall Risk Sign visible to staff  - Offer Toileting every 2 Hours, in advance of need  - Initiate/Maintain bed alarm  - Obtain necessary fall risk management equipment  - Apply yellow socks and bracelet for high fall risk patients  - Consider moving patient to room near nurses station  Outcome: Progressing  Goal: Maintain or return to baseline ADL function  Description: INTERVENTIONS:  -  Assess patient's ability to carry out ADLs; assess patient's baseline for ADL function and identify physical deficits which impact ability to perform ADLs (bathing, care of mouth/teeth, toileting, grooming, dressing, etc.)  - Assess/evaluate cause of self-care deficits   - Assess range of motion  - Assess patient's mobility; develop plan if impaired  - Assess patient's need for assistive devices and provide as appropriate  - Encourage maximum independence but intervene and supervise when necessary  - Involve family in performance of ADLs  - Assess for home  care needs following discharge   - Consider OT consult to assist with ADL evaluation and planning for discharge  - Provide patient education as appropriate  - Monitor functional capacity and physical performance, use of AM PAC & JH-HLM   - Monitor gait, balance and fatigue with ambulation    Outcome: Progressing  Goal: Maintains/Returns to pre admission functional level  Description: INTERVENTIONS:  - Perform AM-PAC 6 Click Basic Mobility/ Daily Activity assessment daily.  - Set and communicate daily mobility goal to care team and patient/family/caregiver.   - Collaborate with rehabilitation services on mobility goals if consulted  - Perform Range of Motion 3 times a day.  - Reposition patient every 2 hours.  - Dangle patient 3 times a day  - Stand patient 3 times a day  - Ambulate patient 3 times a day  - Out of bed to chair 3 times a day   - Out of bed for meals 3 times a day  - Out of bed for toileting  - Record patient progress and toleration of activity level   Outcome: Progressing     Problem: DISCHARGE PLANNING  Goal: Discharge to home or other facility with appropriate resources  Description: INTERVENTIONS:  - Identify barriers to discharge w/patient and caregiver  - Arrange for needed discharge resources and transportation as appropriate  - Identify discharge learning needs (meds, wound care, etc.)  - Arrange for interpretive services to assist at discharge as needed  - Refer to Case Management Department for coordinating discharge planning if the patient needs post-hospital services based on physician/advanced practitioner order or complex needs related to functional status, cognitive ability, or social support system  Outcome: Progressing     Problem: Knowledge Deficit  Goal: Patient/family/caregiver demonstrates understanding of disease process, treatment plan, medications, and discharge instructions  Description: Complete learning assessment and assess knowledge base.  Interventions:  - Provide teaching  at level of understanding  - Provide teaching via preferred learning methods  Outcome: Progressing     Problem: RESPIRATORY - ADULT  Goal: Achieves optimal ventilation and oxygenation  Description: INTERVENTIONS:  - Assess for changes in respiratory status  - Assess for changes in mentation and behavior  - Position to facilitate oxygenation and minimize respiratory effort  - Oxygen administered by appropriate delivery if ordered  - Initiate smoking cessation education as indicated  - Encourage broncho-pulmonary hygiene including cough, deep breathe, Incentive Spirometry  - Assess the need for suctioning and aspirate as needed  - Assess and instruct to report SOB or any respiratory difficulty  - Respiratory Therapy support as indicated  Outcome: Progressing     Problem: SKIN/TISSUE INTEGRITY - ADULT  Goal: Skin Integrity remains intact(Skin Breakdown Prevention)  Description: Assess:  -Perform Micky assessment   -Clean and moisturize skin   -Inspect skin when repositioning, toileting, and assisting with ADLS  -Assess under medical devices  -Assess extremities for adequate circulation and sensation     Bed Management:  -Have minimal linens on bed & keep smooth, unwrinkled  -Change linens as needed when moist or perspiring  -Avoid sitting or lying in one position for more than 2 hours while in bed  -Keep HOB at 30 degrees     Toileting:  -Offer bedside commode  -Assess for incontinence  -Use incontinent care products after each incontinent episode    Activity:  -Mobilize patient 3 times a day  -Encourage activity and walks on unit  -Encourage or provide ROM exercises   -Turn and reposition patient every 2 Hours  -Use appropriate equipment to lift or move patient in bed  -Instruct/ Assist with weight shifting when out of bed in chair  -Consider limitation of chair time 2 hour intervals    Skin Care:  -Avoid use of baby powder, tape, friction and shearing, hot water or constrictive clothing  -Relieve pressure over bony  prominences  -Do not massage red bony areas    Next Steps:  -Teach patient strategies to minimize risks   -Consider consults to  interdisciplinary teams   Outcome: Progressing

## 2025-06-14 NOTE — UTILIZATION REVIEW
Continued Stay Review    Date: 6/14/25                           Current Patient Class: Inpatient  Current Level of Care: ICU/stepdown       HPI:74 y.o. male initially admitted on 6/10/25      Current Diagnosis: Acute respiratory failure with Hypoxia     Assessment/Plan: Hospitalist : Patient is feeling better today, less short of breath. States he finally had a decent night sleep. UOP 1975 ml. Patient has been further weaned to 1 L nasal cannula oxygen overnight. Follow-up chest x-ray yesterday improving. Plan:  Will continue IV antibiotics today. Repeat labs in AM. Hopeful discharge home next 24 to 48 hours.       Certification Statement: The patient will continue to require additional inpatient hospital stay due to continuing to try to wean oxygen, continuing IV antibiotics for CAP, repeat labs in the a.m.       Medications:   Scheduled Medications:  amLODIPine, 5 mg, Oral, Daily  apixaban, 5 mg, Oral, BID  cefTRIAXone, 1,000 mg, Intravenous, Q24H  famotidine, 20 mg, Oral, BID  flecainide, 50 mg, Oral, BID  guaiFENesin, 1,200 mg, Oral, BID  latanoprost, 1 drop, Both Eyes, HS  lidocaine, 1 patch, Topical, Daily  lisinopril, 10 mg, Oral, Daily  metoprolol succinate, 25 mg, Oral, Daily  multivitamin-minerals, 1 tablet, Oral, Daily  pravastatin, 20 mg, Oral, Daily  tamsulosin, 0.4 mg, Oral, Daily With Dinner      Continuous IV Infusions: none      PRN Meds:  acetaminophen, 650 mg, Oral, Q6H PRN  albuterol, 2.5 mg, Nebulization, Q4H PRN  labetalol, 10 mg, Intravenous, Q4H PRN  ondansetron, 4 mg, Intravenous, Q6H PRN  polyethylene glycol, 17 g, Oral, Daily PRN      Discharge Plan: TBD     Vital Signs (last 3 days)       Date/Time Temp Pulse Resp BP MAP (mmHg) SpO2 Calculated FIO2 (%) - Nasal Cannula O2 Flow Rate (L/min) Nasal Cannula O2 Flow Rate (L/min) O2 Device Patient Position - Orthostatic VS Dane Coma Scale Score Pain    06/14/25 1101 97.3 °F (36.3 °C) 81 23 137/73 99 96 % -- -- -- Nasal cannula Sitting -- --     06/14/25 1100 -- 81 45 137/73 99 -- -- -- -- -- Sitting -- --    06/14/25 0940 -- -- -- -- -- -- -- -- -- -- -- -- No Pain    06/14/25 0900 -- 96 28 162/72 104 96 % 24 -- 1 L/min Nasal cannula Sitting -- --    06/14/25 0856 -- 91 -- 162/72 -- -- -- -- -- -- -- -- --    06/14/25 0800 -- -- -- -- -- -- -- -- -- -- -- 15 --    06/14/25 0706 97.6 °F (36.4 °C) 81 21 134/60 87 94 % 24 -- 1 L/min Nasal cannula Lying -- --    06/14/25 0200 -- 82 20 -- -- 93 % 24 -- 1 L/min Nasal cannula Lying -- --    06/14/25 0100 -- 85 20 -- -- 94 % 24 -- 1 L/min Nasal cannula Lying -- --    06/14/25 0000 -- 91 20 -- -- 95 % 24 -- 1 L/min Nasal cannula Lying -- --    06/13/25 2300 -- 88 20 -- -- 94 % 24 -- 1 L/min Nasal cannula Lying -- --    06/13/25 2200 -- 84 20 -- -- 96 % 24 -- 1 L/min Nasal cannula Lying -- --    06/13/25 2100 -- 81 22 -- -- 94 % 24 -- 1 L/min Nasal cannula Lying -- --    06/13/25 2000 98 °F (36.7 °C) 84 20 148/68 100 90 % -- -- -- None (Room air) Lying 15 No Pain    06/13/25 1900 -- 77 20 -- -- 93 % 24 -- 1 L/min Nasal cannula Lying -- --    06/13/25 1506 -- 81 26 -- -- 96 % 24 -- 1 L/min  Nasal cannula -- -- --    06/13/25 1500 97.6 °F (36.4 °C) 87 30 144/65 94 96 % -- -- -- -- Sitting -- --    06/13/25 1100 97.3 °F (36.3 °C) 91 31 154/75 107 96 % 28 -- 2 L/min Nasal cannula Sitting -- --    06/13/25 1030 -- -- -- -- -- -- -- -- -- -- -- -- 5    06/13/25 0900 -- 100 28 158/74 106 94 % -- -- -- -- Lying -- --    06/13/25 0847 -- -- -- -- -- -- -- -- -- -- -- -- Med Not Given for Pain - for MAR use only    06/13/25 0800 -- -- -- -- -- -- -- -- -- -- -- 15 --    06/13/25 0700 99.5 °F (37.5 °C) 105 44 171/91 124 92 % 28 -- 2 L/min Nasal cannula Lying -- --    06/13/25 0400 99 °F (37.2 °C) 87 18 161/76 109 96 % -- -- -- -- -- -- --    06/13/25 0300 -- 84 27 -- -- 95 % -- -- -- -- -- -- --    06/13/25 0000 -- 82 18 114/70 87 94 % -- -- -- -- -- -- --    06/12/25 2200 -- 87 20 141/75 102 96 % -- -- -- -- -- -- --     06/12/25 2000 -- 94 -- -- -- -- -- -- -- -- -- 15 No Pain    06/12/25 1721 -- -- -- -- -- -- -- -- -- -- -- -- 3    06/12/25 1500 99.1 °F (37.3 °C) 96 36 147/70 100 94 % -- -- -- Nasal cannula Sitting -- --    06/12/25 1200 -- 85 24 145/71 100 95 % -- -- -- -- -- -- --    06/12/25 1142 97.8 °F (36.6 °C) 83 31 136/66 92 95 % -- -- -- Nasal cannula Sitting -- --    06/12/25 0820 -- -- -- -- -- -- -- -- -- -- -- -- 2    06/12/25 0800 -- 86 22 164/77 110 95 % -- -- -- -- -- 15 --    06/12/25 0759 -- -- -- -- -- 95 % 28 -- 2 L/min Nasal cannula -- -- --    06/12/25 0730 97.2 °F (36.2 °C) 79 25 102/52 74 97 % 28 -- 2 L/min Nasal cannula Lying -- No Pain    06/12/25 0600 -- 78 31 120/63 85 97 % -- -- -- -- -- -- --    06/12/25 0500 96.9 °F (36.1 °C) 84 33 -- -- 98 % -- -- -- -- -- -- --    06/12/25 0400 -- 74 22 97/53 70 98 % -- -- -- -- -- -- --    06/12/25 0300 -- 77 25 -- -- 97 % -- -- -- -- -- -- --    06/12/25 0200 -- 81 27 107/54 78 97 % -- -- -- -- -- -- --    06/12/25 0101 -- 92 32 -- -- 91 % -- -- -- -- -- -- --    06/12/25 0100 -- 91 35 -- -- 92 % -- -- -- -- -- -- --    06/12/25 0001 -- 109 30 189/81 117 88 % -- -- -- -- -- -- 3    06/11/25 2320 -- 114 42 205/88 127 92 % -- -- -- -- -- -- --    06/11/25 2300 -- 112 37 193/88 126 90 % -- -- -- -- -- -- --    06/11/25 2200 -- 105 30 139/78 104 91 % -- -- -- -- -- -- --    06/11/25 2100 -- 87 32 136/61 88 93 % -- -- -- -- -- -- --    06/11/25 2027 -- -- 18 -- -- -- 28 -- 2 L/min Nasal cannula -- -- --    06/11/25 2000 -- 87 33 -- -- 94 % -- -- -- -- -- 15 No Pain    06/11/25 1916 98.1 °F (36.7 °C) -- -- -- -- -- -- -- -- -- -- -- --    06/11/25 1900 -- 83 28 98/57 75 93 % -- -- -- -- -- -- --    06/11/25 1800 -- 93 26 142/66 95 93 % 28 -- 2 L/min Nasal cannula -- -- --    06/11/25 1700 -- 99 26 136/65 92 93 % -- -- -- Nasal cannula -- -- --    06/11/25 1600 99.9 °F (37.7 °C) 97 22 142/77 99 95 % -- -- -- None (Room air) Lying 15 --    06/11/25 1400 -- 98 22 144/64  92 94 % -- -- -- -- -- -- --    06/11/25 1309 -- 95 24 153/72 104 96 % 28 -- 2 L/min  Nasal cannula -- -- --    06/11/25 1300 -- 98 20 153/72 104 95 % -- -- -- -- -- -- --    06/11/25 1200 -- -- -- -- -- -- -- -- -- -- -- 15 --    06/11/25 0924 -- -- -- -- -- -- -- -- -- -- -- -- 3    06/11/25 0908 -- -- -- -- -- -- -- -- -- -- -- -- 3    06/11/25 0900 -- 97 24 127/57 82 96 % -- -- -- -- -- -- --    06/11/25 0856 -- -- -- -- -- -- 32 -- 3 L/min  Nasal cannula -- -- --    06/11/25 0800 -- -- -- -- -- -- 40 -- 5 L/min -- -- 15 --    06/11/25 0731 -- -- -- -- -- -- -- -- -- -- -- -- 3    06/11/25 0723 -- 76 -- 128/80 -- 96 % 40 -- 5 L/min  Nasal cannula  -- -- --    06/11/25 0700 98 °F (36.7 °C) 88 25 157/72 104 98 % -- -- -- -- Lying -- --    06/11/25 0424 -- -- -- -- -- -- -- -- -- -- -- -- 6    06/11/25 0400 98.5 °F (36.9 °C) 103 22 157/75 108 95 % 44 -- 6 L/min Mid flow nasal cannula Lying -- No Pain    06/11/25 0202 -- -- -- -- -- -- 44 6 L/min 6 L/min  Mid flow nasal cannula -- -- --    06/11/25 0000 99 °F (37.2 °C) 96 24 145/78 106 97 % 52 -- 8 L/min Mid flow nasal cannula Lying 15 No Pain          Weight (last 2 days)       Date/Time Weight    06/14/25 0600 93.2 (205.47)    06/13/25 0300 93.3 (205.69)    06/12/25 0500 93.3 (205.69)            Pertinent Labs/Diagnostic Results:   Radiology:  XR chest portable   Final Interpretation by Paige [facility] MD (06/13 1514)      Improving right lower lobe pneumonia.            Workstation performed: [id number]         XR chest portable ICU   Final Interpretation by Mery Clark MD (06/11 0807)      Improving right lower lobe pneumonia.            Workstation performed: [id number]         CT chest abdomen pelvis w contrast   Final Interpretation by Saran Kiran MD (06/10 1051)      1. Significant right lower lobe pneumonia and small right pleural effusion. Mediastinal and hilar lymph nodes are likely reactive. Follow-up noncontrast chest CT is recommended  in 3 months.      2. Solid 5 mm left upper lobe pulmonary nodule. The nodule will be reassessed on the aforementioned CT.      3. 1.8 cm right adrenal nodule. Although its imaging features are indeterminate, it does not have suspicious imaging features (heterogeneity, necrosis, irregular margins), therefore this is likely benign, and can be followed by non-contrast abdomen CT or    MRI in 12 months. Please note that for adrenal nodule > 1 cm,  biochemical evaluation is suggested to rule out functioning adenoma, if not already performed. Considerations related to the patient's age and/or comorbidities may be used to alter these    recommendations.      The study was marked in EPIC for immediate notification.      Resident: ZEE MALAVE I, the attending radiologist, have reviewed the images and agree with the final report above.      Workstation performed: [id number]         XR chest 1 view portable   ED Interpretation by Brody Riggs MD (06/10 1058)   Right side Pneumonia       Final Interpretation by Khris Gonzalez MD (06/10 3668)      Right lower lobe pneumonia.            Workstation performed: [id number]           Cardiology:  ECG 12 lead   Final Result by Lane Menchaca MD (06/12 0840)   Atrial tachycardia with rapid ventricular response   Left axis deviation   Low voltage QRS   Nonspecific ST-t wave changes   When compared with ECG of 10-Nain-2025 17:59,   Vent. rate has increased by  79 bpm      Confirmed by Lane Menchaca ([id number]) on 6/12/2025 8:53:38 AM      Echo complete w/ contrast if indicated   Final Result by Lane Menchaca MD (06/11 0803)        Left Ventricle: Left ventricular cavity size is normal. Wall thickness    is normal. The left ventricular ejection fraction is 55%. Systolic    function is normal. Wall motion is normal.     Right Ventricle: Right ventricular cavity size is normal. Systolic    function is normal.     Left Atrium: The atrium is dilated.         ECG 12 lead    Final Result by Lane Menchaca MD (06/11 0836)   Atrial fibrillation   Controlled ventricular response   Nonspecific ST-t wave changes   when compared to  10-Nain-2025 08:24,   No significant change was found      Confirmed by Lane Menchaca ([id number]) on 6/11/2025 8:36:01 AM      ECG 12 lead   Final Result by Lane Menchaca MD (06/10 1104)   Atrial fibrillation   Rightward axis   Intraventricular conduction delay   When compared with ECG of 02-Jan-2023 14:14,   Vent. rate has increased by  35 bpm   Confirmed by Lane Menchaca ([id number]) on 6/10/2025 11:04:09 AM        Results from last 7 days   Lab Units 06/10/25  0836   SARS-COV-2  Negative     Results from last 7 days   Lab Units 06/14/25  0426 06/13/25  0317 06/12/25  0505 06/11/25  0426 06/10/25  1623 06/10/25  0836   WBC Thousand/uL 8.26 10.97* 12.37* 12.38*  --  12.59*   HEMOGLOBIN g/dL 11.7* 10.8* 11.9* 11.5*  --  12.3   I STAT HEMOGLOBIN g/dl  --   --   --   --  12.2  --    HEMATOCRIT % 36.8 34.9* 37.6 36.4*  --  38.5   HEMATOCRIT, ISTAT %  --   --   --   --  36*  --    PLATELETS Thousands/uL 269 230 229 198  --  223   TOTAL NEUT ABS Thousands/µL  --   --   --   --   --  10.89*         Results from last 7 days   Lab Units 06/14/25  0426 06/13/25 0317 06/12/25  0505 06/11/25  0426 06/10/25  1729 06/10/25  1623   SODIUM mmol/L 138 137 136 135 137  --    POTASSIUM mmol/L 3.3* 3.9 4.3 3.9 4.2  --    CHLORIDE mmol/L 101 101 100 101 103  --    CO2 mmol/L 29 29 28 25 25  --    CO2, I-STAT mmol/L  --   --   --   --   --  22   ANION GAP mmol/L 8 7 8 9 9  --    BUN mg/dL 11 13 15 15 14  --    CREATININE mg/dL 0.64 0.73 0.82 1.01 1.05  --    EGFR ml/min/1.73sq m 95 90 85 71 68  --    CALCIUM mg/dL 8.2* 8.6 9.0 8.5 8.3*  --    CALCIUM, IONIZED mmol/L  --   --  1.08* 1.06* 1.01*  --    CALCIUM, IONIZED, ISTAT mmol/L  --   --   --   --   --  1.16   MAGNESIUM mg/dL  --   --  2.3 1.8* 1.9  --    PHOSPHORUS mg/dL  --   --  3.3 2.4 3.1  --      Results from last 7 days    Lab Units 06/10/25  1729 06/10/25  0836   AST U/L 14 14   ALT U/L 11 10   ALK PHOS U/L 54 55   TOTAL PROTEIN g/dL 6.8 6.8   ALBUMIN g/dL 4.0 4.1   TOTAL BILIRUBIN mg/dL 1.29* 1.59*         Results from last 7 days   Lab Units 06/14/25  0426 06/13/25  0317 06/12/25  0505 06/11/25  0426 06/10/25  1729 06/10/25  0836   GLUCOSE RANDOM mg/dL 106 137 131 126 125 125         Results from last 7 days   Lab Units 06/11/25  0426   HEMOGLOBIN A1C % 6.0*   EAG mg/dl 126         Results from last 7 days   Lab Units 06/10/25  1623   I STAT BASE EXC mmol/L -5*   I STAT O2 SAT % 98*   ISTAT PH ART  7.302*   I STAT ART PCO2 mm HG 42.3   I STAT ART PO2 mm .0   I STAT ART HCO3 mmol/L 20.9*         Results from last 7 days   Lab Units 06/10/25  1729   HS TNI 0HR ng/L 16         Results from last 7 days   Lab Units 06/10/25  0836   PROTIME seconds 26.5*   INR  2.41*   PTT seconds 49*         Results from last 7 days   Lab Units 06/12/25  0505 06/11/25  0426 06/10/25  0836   PROCALCITONIN ng/ml 0.95* 0.91* 0.62*     Results from last 7 days   Lab Units 06/10/25  0836   LACTIC ACID mmol/L 1.0             Results from last 7 days   Lab Units 06/10/25  1504   BNP pg/mL 409*         Results from last 7 days   Lab Units 06/10/25  0957   CLARITY UA  Clear   COLOR UA  Yellow   SPEC GRAV UA  1.010   PH UA  6.0   GLUCOSE UA mg/dl Negative   KETONES UA mg/dl 15 (1+)*   BLOOD UA  Negative   PROTEIN UA mg/dl Trace*   NITRITE UA  Negative   BILIRUBIN UA  Negative   UROBILINOGEN UA E.U./dl 0.2   LEUKOCYTES UA  Negative   WBC UA /hpf 0-1   RBC UA /hpf None Seen   BACTERIA UA /hpf Occasional   EPITHELIAL CELLS WET PREP /hpf Occasional     Results from last 7 days   Lab Units 06/10/25  1503 06/10/25  0836   STREP PNEUMONIAE ANTIGEN, URINE  Negative  --    LEGIONELLA URINARY ANTIGEN  Negative  --    INFLUENZA A PCR   --  Negative   INFLUENZA B PCR   --  Negative   RSV PCR   --  Negative             Results from last 7 days   Lab Units  06/10/25  0836   BLOOD CULTURE  No Growth After 4 Days.  No Growth After 4 Days.                   Network Utilization Review Department  ATTENTION: Please call with any questions or concerns to [phone] and carefully listen to the prompts so that you are directed to the right person. All voicemails are confidential.   For Discharge needs, contact Care Management DC Support Team at [phone] opt. [phone]  Send all requests for admission clinical reviews, approved or denied determinations and any other requests to dedicated fax number below belonging to the [facility] where the patient is receiving treatment. List of dedicated fax numbers for the Facilities:  FACILITY NAME UR FAX NUMBER   ADMISSION DENIALS (Administrative/Medical Necessity) [phone]   DISCHARGE SUPPORT TEAM (NETWORK) [phone]   PARENT CHILD HEALTH (Maternity/NICU/Pediatrics) [phone]   [facility] [phone]   [facility] [phone]   [facility] [phone]   [facility] [phone]   [facility] [phone]   [facility] [phone]   [facility] [phone]   [facility] [phone]   [facility] [phone]   [facility] [phone]   [facility] [phone]   [facility] [phone]

## 2025-06-14 NOTE — PLAN OF CARE
Problem: PHYSICAL THERAPY ADULT  Goal: Performs mobility at highest level of function for planned discharge setting.  See evaluation for individualized goals.  Description: Treatment/Interventions: Functional transfer training, Therapeutic exercise, Endurance training, Gait training, Bed mobility, Equipment eval/education, Elevations  Equipment Recommended: Walker       See flowsheet documentation for full assessment, interventions and recommendations.  Outcome: Progressing  Note: Prognosis: Fair     Assessment: Pt seen for PT treatment session this date with interventions consisting of gait training to normalize gait pattern to decrease fall risk and therapeutic exercise to improve endurance to improve functional mobility. Pt agreeable to PT treatment session upon arrival, pt found seated OOB in recliner, in no apparent distress. Since previous session, pt has made good progress as evidenced by increased distance ambulated and decreased assistance required with mobility  Barriers during this session include fatigue.  Pt continues to be functioning below baseline level, and remains limited 2* factors listed above and including impaired activity tolerance, impaired  balance, decreased endurance, and decreased mobility.  Pt prognosis for achieving goals is good, pending pt progress with hospitalization/medical status improvements, and indicated by motivated to participate in therapy, ability to follow cues, and improvement with mobility status. PT will continue to see pt during current hospitalization in order to address the deficits listed above and provide interventions consistent w/ POC in effort to achieve goals. Current goals and POC remain appropriate, pt continues to have rehab potential  Upon conclusion pt seated OOB in recliner. The patient's AM-PAC Basic Mobility Inpatient Short Form Raw Score is 19.  A Raw score of greater than 16 suggests the patient may benefit from discharge to home. Based on patient  presentations and impairments, pt would most appropriately benefit from Level 3 resource intensity upon discharge.  Please also refer to the recommendation of the Physical Therapist for safe discharge planning. RN verbalized pt appropriate for PT session.  Barriers to Discharge: Inaccessible home environment     Rehab Resource Intensity Level, PT: III (Minimum Resource Intensity)    See flowsheet documentation for full assessment.

## 2025-06-14 NOTE — ASSESSMENT & PLAN NOTE
Rapid response called afternoon of 6/10 for escalating O2 requirements after receiving volume resuscitation - noted to have increased WOB/hypoxia/diffuse rales w/expiratory wheezing-patient was transferred to ICU as a level 1 stepdown  See also plan for acute respiratory failure with hypoxia  6/11 TTE LVEF 55% with normal systolic function and normal wall motion.  Right ventricular normal systolic function  Patient is not on diuretics in the outpatient setting  Is s/p IV Lasix 80 mg x1 (6/10)  Continue to monitor fluid status closely  Daily weight/I & O  Patient is -2.9 L to date, euvolemic on exam  Patient is not receiving diuretics currently  6/13 repeat chest x-ray:Improving right lower lobe pneumonia.   Patient further weaned to 1 L nasal cannula oxygen  Respiratory protocol continue to wean oxygen as able

## 2025-06-15 LAB
ANION GAP SERPL CALCULATED.3IONS-SCNC: 7 MMOL/L (ref 4–13)
BACTERIA BLD CULT: NORMAL
BACTERIA BLD CULT: NORMAL
BUN SERPL-MCNC: 12 MG/DL (ref 5–25)
CALCIUM SERPL-MCNC: 8.8 MG/DL (ref 8.4–10.2)
CHLORIDE SERPL-SCNC: 100 MMOL/L (ref 96–108)
CO2 SERPL-SCNC: 33 MMOL/L (ref 21–32)
CREAT SERPL-MCNC: 0.62 MG/DL (ref 0.6–1.3)
ERYTHROCYTE [DISTWIDTH] IN BLOOD BY AUTOMATED COUNT: 13.5 % (ref 11.6–15.1)
GFR SERPL CREATININE-BSD FRML MDRD: 96 ML/MIN/1.73SQ M
GLUCOSE SERPL-MCNC: 104 MG/DL (ref 65–140)
HCT VFR BLD AUTO: 37.3 % (ref 36.5–49.3)
HGB BLD-MCNC: 11.9 G/DL (ref 12–17)
MCH RBC QN AUTO: 30.2 PG (ref 26.8–34.3)
MCHC RBC AUTO-ENTMCNC: 31.9 G/DL (ref 31.4–37.4)
MCV RBC AUTO: 95 FL (ref 82–98)
PLATELET # BLD AUTO: 318 THOUSANDS/UL (ref 149–390)
PMV BLD AUTO: 9.4 FL (ref 8.9–12.7)
POTASSIUM SERPL-SCNC: 3.5 MMOL/L (ref 3.5–5.3)
RBC # BLD AUTO: 3.94 MILLION/UL (ref 3.88–5.62)
SODIUM SERPL-SCNC: 140 MMOL/L (ref 135–147)
WBC # BLD AUTO: 6.5 THOUSAND/UL (ref 4.31–10.16)

## 2025-06-15 PROCEDURE — 85027 COMPLETE CBC AUTOMATED: CPT

## 2025-06-15 PROCEDURE — 80048 BASIC METABOLIC PNL TOTAL CA: CPT

## 2025-06-15 PROCEDURE — 94760 N-INVAS EAR/PLS OXIMETRY 1: CPT

## 2025-06-15 PROCEDURE — 94664 DEMO&/EVAL PT USE INHALER: CPT

## 2025-06-15 PROCEDURE — 99232 SBSQ HOSP IP/OBS MODERATE 35: CPT

## 2025-06-15 RX ORDER — POTASSIUM CHLORIDE 1500 MG/1
20 TABLET, EXTENDED RELEASE ORAL ONCE
Status: COMPLETED | OUTPATIENT
Start: 2025-06-15 | End: 2025-06-15

## 2025-06-15 RX ORDER — FUROSEMIDE 10 MG/ML
40 INJECTION INTRAMUSCULAR; INTRAVENOUS ONCE
Status: COMPLETED | OUTPATIENT
Start: 2025-06-15 | End: 2025-06-15

## 2025-06-15 RX ADMIN — FUROSEMIDE 40 MG: 10 INJECTION, SOLUTION INTRAMUSCULAR; INTRAVENOUS at 09:26

## 2025-06-15 RX ADMIN — FLECAINIDE ACETATE 50 MG: 50 TABLET ORAL at 08:36

## 2025-06-15 RX ADMIN — APIXABAN 5 MG: 5 TABLET, FILM COATED ORAL at 08:35

## 2025-06-15 RX ADMIN — AMLODIPINE BESYLATE 5 MG: 5 TABLET ORAL at 08:35

## 2025-06-15 RX ADMIN — GUAIFENESIN 1200 MG: 600 TABLET ORAL at 08:35

## 2025-06-15 RX ADMIN — POTASSIUM CHLORIDE 20 MEQ: 1500 TABLET, EXTENDED RELEASE ORAL at 08:36

## 2025-06-15 RX ADMIN — FAMOTIDINE 20 MG: 20 TABLET, FILM COATED ORAL at 17:28

## 2025-06-15 RX ADMIN — GUAIFENESIN 1200 MG: 600 TABLET ORAL at 17:28

## 2025-06-15 RX ADMIN — APIXABAN 5 MG: 5 TABLET, FILM COATED ORAL at 17:28

## 2025-06-15 RX ADMIN — Medication 1 TABLET: at 08:35

## 2025-06-15 RX ADMIN — FLECAINIDE ACETATE 50 MG: 50 TABLET ORAL at 17:28

## 2025-06-15 RX ADMIN — TAMSULOSIN HYDROCHLORIDE 0.4 MG: 0.4 CAPSULE ORAL at 17:28

## 2025-06-15 RX ADMIN — LATANOPROST 1 DROP: 50 SOLUTION OPHTHALMIC at 21:13

## 2025-06-15 RX ADMIN — METOPROLOL SUCCINATE 25 MG: 25 TABLET, EXTENDED RELEASE ORAL at 08:35

## 2025-06-15 RX ADMIN — LISINOPRIL 10 MG: 10 TABLET ORAL at 08:36

## 2025-06-15 RX ADMIN — PRAVASTATIN SODIUM 20 MG: 20 TABLET ORAL at 08:35

## 2025-06-15 RX ADMIN — CEFTRIAXONE SODIUM 1000 MG: 10 INJECTION, POWDER, FOR SOLUTION INTRAVENOUS at 08:39

## 2025-06-15 RX ADMIN — FAMOTIDINE 20 MG: 20 TABLET, FILM COATED ORAL at 08:35

## 2025-06-15 NOTE — ASSESSMENT & PLAN NOTE
Admitted 6/10 under [facility] for sepsis 2/2 RLL pneumonia w/only 2L NC requirement   S/p IV fluid resuscitation which resulted in flash pulmonary edema see plan for same\r  See also plan for sepsis due to pneumonia  Patient remains on 1 L nasal cannula oxygen has been unable to wean   Have been monitoring fluid status closely   Will give one-time dose of 40 mg IV Lasix today    Encourage mobility, I-S, out of bed to chair  Respiratory protocol continue to wean oxygen as able

## 2025-06-15 NOTE — ASSESSMENT & PLAN NOTE
Present on arrival to ED   AEB tachycardia, tachypnea, leukocytosis  Is s/p IV fluid resuscitation (6/10)-flash pulmonary edema afterwards see plan for same  Chest x-ray right lower lobe pneumonia  COVID/flu/RSV negative  Procalcitonin 0.62  Lactic acid 1.0  UA negative  Urinary antigens negative  CT chest abdomen pelvis noted right lower lobe pneumonia and small right pleural effusion, also 5 mm lung nodule left lung and adrenal nodule-will need outpatient follow-up  Follow-up chest x-ray 6/13 noted improvement in pneumonia  Have been monitoring fluid status closely throughout  Have been unable to wean off of oxygen and remains on 1 L nasal cannula  Will give additional 40 mg IV Lasix x 1 today  Respiratory protocol continue to wean oxygen as able  Continue Rocephin  SIRS criteria resolved  CBC a.m.

## 2025-06-15 NOTE — PROGRESS NOTES
Progress Note - Hospitalist   Name: Ramon Keith 74 y.o. male I MRN: [id number]  Unit/Bed#: -01 I Date of Admission: 6/10/2025   Date of Service: 6/15/2025 I Hospital Day: 5    Assessment & Plan  Sepsis due to pneumonia (HCC)  Present on arrival to ED   AEB tachycardia, tachypnea, leukocytosis  Is s/p IV fluid resuscitation (6/10)-flash pulmonary edema afterwards see plan for same  Chest x-ray right lower lobe pneumonia  COVID/flu/RSV negative  Procalcitonin 0.62  Lactic acid 1.0  UA negative  Urinary antigens negative  CT chest abdomen pelvis noted right lower lobe pneumonia and small right pleural effusion, also 5 mm lung nodule left lung and adrenal nodule-will need outpatient follow-up  Follow-up chest x-ray 6/13 noted improvement in pneumonia  Have been monitoring fluid status closely throughout  Have been unable to wean off of oxygen and remains on 1 L nasal cannula  Will give additional 40 mg IV Lasix x 1 today  Respiratory protocol continue to wean oxygen as able  Continue Rocephin  SIRS criteria resolved  CBC a.m.  Atrial fibrillation (HCC)  Is s/p successful cardioversion in past, however currently in AF (rate-controlled)  Continue home AC w/Eliquis  Continue home metoprolol for rate control  Patient had an incident of elevated heart rate 6/12 AM- EKG revealed atrial tachycardia with rapid ventricular response heart rate 173.  Per nursing, patient had no chest pain at that time. He was receiving a.m. care at the time likely related to exertion. No further incidences reported.   No further episodes of heart rate acceleration  Denies dizziness lightheadedness chest pain or palpitations on exam    Hyperlipidemia  Continue statin  Pulmonary nodule  Imaging revealed solid 5 mm left upper lobe pulmonary nodule  Will require follow-up w/repeat imaging as OP  Acute respiratory failure with hypoxia (HCC)  Admitted 6/10 under [facility] for sepsis 2/2 RLL pneumonia w/only 2L NC requirement   S/p IV fluid  resuscitation which resulted in flash pulmonary edema see plan for same\r  See also plan for sepsis due to pneumonia  Patient remains on 1 L nasal cannula oxygen has been unable to wean   Have been monitoring fluid status closely   Will give one-time dose of 40 mg IV Lasix today    Encourage mobility, I-S, out of bed to chair  Respiratory protocol continue to wean oxygen as able  Hypertensive urgency  Noted to be hypertensive at time of rapid response 6/10  Has underlying HTN and received 3L crystalloid  Is s/p IV Lasix 80 mg x1 (6/10), given additional 40 mg IV x 1 today  Blood pressure is well-controlled   Continue prehospital amlodipine, lisinopril, metoprolol  Continue as needed labetalol  Monitor BP closely per protocol, will adjust management accordingly.   Flash pulmonary edema (HCC)  6/10 rapid response for escalating O2 requirements after receiving volume resuscitation   See also plan for acute respiratory failure with hypoxia  6/11 TTE LVEF 55% with normal systolic function and normal wall motion.  Right ventricular normal systolic function  Patient is not on diuretics in the outpatient setting  Is s/p IV Lasix 80 mg x1 (6/10)  Have been monitoring fluid status closely throughout  Will give additional 40 mg Lasix x 1 today  Patient remains on 1 L nasal cannula oxygen has not been able to wean  6/13 repeat chest x-ray:Improving right lower lobe pneumonia.   Respiratory protocol continue to wean oxygen as able  Adrenal nodule (HCC)  Imaging revealed 1.8 cm right adrenal nodule  Will require follow-up as OP w/either non-contrast abdomen CT or MRI in 12 months  BPH (benign prostatic hyperplasia)  Continue home Flomax  Monitor urinary output  Generalized weakness  Patient reports feeling weaker than baseline, unsteady on his feet  PT/OT reevaluated patient 6/14 feel patient is appropriate for outpatient PT OT on discharge  Fall precautions  Case management continues to follow for discharge  planning  Hypokalemia  Continue to monitor and replete accordingly  BMP a.m.    VTE Pharmacologic Prophylaxis: VTE Score: 9 High Risk (Score >/= 5) - Pharmacological DVT Prophylaxis Ordered: apixaban (Eliquis). Sequential Compression Devices Ordered.    Mobility:   Basic Mobility Inpatient Raw Score: 19  JH-HLM Goal: 6: Walk 10 steps or more  JH-HLM Achieved: 6: Walk 10 steps or more  JH-HLM Goal achieved. Continue to encourage appropriate mobility.    Patient Centered Rounds: I performed bedside rounds with nursing staff today.   Discussions with Specialists or Other Care Team Provider: PT/OT, nursing, case management    Education and Discussions with Family / Patient: Updated  (sister) via phone.    Current Length of Stay: 5 day(s)  Current Patient Status: Inpatient   Certification Statement: The patient will continue to require additional inpatient hospital stay due to continuing to try to wean oxygen, continuing IV antibiotics for CAP, will give additional IV Lasix dose today and monitor fluid status closely repeat labs in the a.m.  Discharge Plan: Anticipate discharge home next 24 to 48 hours pending clinical improvement.  Repeat labs in AM.    Code Status: Level 1 - Full Code    Subjective   Denies dizziness, lightness, chest pain or palpitations.  No complaints of discomfort.  A bit short of breath with exertion yet.  States cough improved.    Objective :  Temp:  [97.3 °F (36.3 °C)-98.3 °F (36.8 °C)] 98.1 °F (36.7 °C)  HR:  [79-89] 79  BP: (134-154)/(68-78) 134/69  Resp:  [20-45] 20  SpO2:  [84 %-96 %] 93 %  O2 Device: Nasal cannula  Nasal Cannula O2 Flow Rate (L/min):  [1 L/min] 1 L/min    Body mass index is 27.58 kg/m².     Input and Output Summary (last 24 hours):     Intake/Output Summary (Last 24 hours) at 6/15/2025 0908  Last data filed at 6/15/2025 0810  Gross per 24 hour   Intake 600 ml   Output 3575 ml   Net -2975 ml       Physical Exam  Vitals reviewed.   Constitutional:       General:  He is not in acute distress.     Appearance: He is well-developed. He is not ill-appearing.   HENT:      Head: Normocephalic and atraumatic.     Cardiovascular:      Rate and Rhythm: Normal rate. Rhythm irregular.      Heart sounds: Normal heart sounds. No murmur heard.  Pulmonary:      Effort: Pulmonary effort is normal. No respiratory distress.      Comments: O2 nasal cannula, occasional cough  Abdominal:      General: There is no distension.      Palpations: Abdomen is soft.      Tenderness: There is no abdominal tenderness. There is no guarding or rebound.     Musculoskeletal:         General: No swelling.      Right lower leg: No edema.      Left lower leg: No edema.     Skin:     General: Skin is warm and dry.      Capillary Refill: Capillary refill takes less than 2 seconds.     Neurological:      General: No focal deficit present.      Mental Status: He is alert and oriented to person, place, and time. Mental status is at baseline.     Psychiatric:         Mood and Affect: Mood normal.         Behavior: Behavior normal.         Thought Content: Thought content normal.         Judgment: Judgment normal.                        Lab Results: I have reviewed the following results:   Results from last 7 days   Lab Units 06/15/25  0507 06/10/25  1623 06/10/25  0836   WBC Thousand/uL 6.50   < > 12.59*   HEMOGLOBIN g/dL 11.9*   < > 12.3   I STAT HEMOGLOBIN   --    < >  --    HEMATOCRIT % 37.3   < > 38.5   HEMATOCRIT, ISTAT   --    < >  --    PLATELETS Thousands/uL 318   < > 223   SEGS PCT %  --   --  86*   LYMPHO PCT %  --   --  7*   MONO PCT %  --   --  5   EOS PCT %  --   --  1    < > = values in this interval not displayed.     Results from last 7 days   Lab Units 06/15/25  0507 06/11/25  0426 06/10/25  1729   SODIUM mmol/L 140   < > 137   POTASSIUM mmol/L 3.5   < > 4.2   CHLORIDE mmol/L 100   < > 103   CO2 mmol/L 33*   < > 25   BUN mg/dL 12   < > 14   CREATININE mg/dL 0.62   < > 1.05   ANION GAP mmol/L 7   < > 9    CALCIUM mg/dL 8.8   < > 8.3*   ALBUMIN g/dL  --   --  4.0   TOTAL BILIRUBIN mg/dL  --   --  1.29*   ALK PHOS U/L  --   --  54   ALT U/L  --   --  11   AST U/L  --   --  14   GLUCOSE RANDOM mg/dL 104   < > 125    < > = values in this interval not displayed.     Results from last 7 days   Lab Units 06/10/25  0836   INR  2.41*         Results from last 7 days   Lab Units 06/11/25  0426   HEMOGLOBIN A1C % 6.0*     Results from last 7 days   Lab Units 06/12/25  0505 06/11/25  0426 06/10/25  0836   LACTIC ACID mmol/L  --   --  1.0   PROCALCITONIN ng/ml 0.95* 0.91* 0.62*       Recent Cultures (last 7 days):   Results from last 7 days   Lab Units 06/10/25  1503 06/10/25  0836   BLOOD CULTURE   --  No Growth After 4 Days.  No Growth After 4 Days.   LEGIONELLA URINARY ANTIGEN  Negative  --        Imaging Results Review: I reviewed radiology reports from this admission including: chest xray and CT abdomen/pelvis.  Other Study Results Review: EKG was reviewed.  Other studies reviewed include: ECHO/TTE    Last 24 Hours Medication List:     Current Facility-Administered Medications:     acetaminophen (TYLENOL) tablet 650 mg, Q6H PRN    albuterol inhalation solution 2.5 mg, Q4H PRN    amLODIPine (NORVASC) tablet 5 mg, Daily    apixaban (ELIQUIS) tablet 5 mg, BID    ceftriaxone (ROCEPHIN) 1 g/50 mL in dextrose IVPB, Q24H, Last Rate: 1,000 mg (06/15/25 0839)    famotidine (PEPCID) tablet 20 mg, BID    flecainide (TAMBOCOR) tablet 50 mg, BID    furosemide (LASIX) injection 40 mg, Once    guaiFENesin (MUCINEX) 12 hr tablet 1,200 mg, BID    labetalol (NORMODYNE) injection 10 mg, Q4H PRN    latanoprost (XALATAN) 0.005 % ophthalmic solution 1 drop, HS    lidocaine (LIDODERM) 5 % patch 1 patch, Daily    lisinopril (ZESTRIL) tablet 10 mg, Daily    metoprolol succinate (TOPROL-XL) 24 hr tablet 25 mg, Daily    multivitamin-minerals (CENTRUM) tablet 1 tablet, Daily    ondansetron (ZOFRAN) injection 4 mg, Q6H PRN    polyethylene glycol  (MIRALAX) packet 17 g, Daily PRN    pravastatin (PRAVACHOL) tablet 20 mg, Daily    tamsulosin (FLOMAX) capsule 0.4 mg, Daily With Dinner    Administrative Statements   Today, Patient Was Seen By: RILEY Cage  I have spent a total time of 37 minutes in caring for this patient on the day of the visit/encounter including Patient and family education, Counseling / Coordination of care, Documenting in the medical record, Reviewing/placing orders in the medical record (including tests, medications, and/or procedures), Obtaining or reviewing history  , and Communicating with other healthcare professionals .    **Please Note: This note may have been constructed using a voice recognition system.**

## 2025-06-15 NOTE — ASSESSMENT & PLAN NOTE
Patient reports feeling weaker than baseline, unsteady on his feet  PT/OT reevaluated patient 6/14 feel patient is appropriate for outpatient PT OT on discharge  Fall precautions  Case management continues to follow for discharge planning

## 2025-06-15 NOTE — PLAN OF CARE
Problem: Potential for Falls  Goal: Patient will remain free of falls  Description: INTERVENTIONS:  - Educate patient/family on patient safety including physical limitations  - Instruct patient to call for assistance with activity   - Consider consulting OT/PT to assist with strengthening/mobility based on AM PAC & JH-HLM score  - Consult OT/PT to assist with strengthening/mobility   - Keep Call bell within reach  - Keep bed low and locked with side rails adjusted as appropriate  - Keep care items and personal belongings within reach  - Initiate and maintain comfort rounds  - Make Fall Risk Sign visible to staff  - Offer Toileting every 2 Hours, in advance of need  - Initiate/Maintain bed alarm  - Obtain necessary fall risk management equipment  - Apply yellow socks and bracelet for high fall risk patients  - Consider moving patient to room near nurses station  Outcome: Progressing     Problem: PAIN - ADULT  Goal: Verbalizes/displays adequate comfort level or baseline comfort level  Description: Interventions:  - Encourage patient to monitor pain and request assistance  - Assess pain using appropriate pain scale  - Administer analgesics as ordered based on type and severity of pain and evaluate response  - Implement non-pharmacological measures as appropriate and evaluate response  - Consider cultural and social influences on pain and pain management  - Notify physician/advanced practitioner if interventions unsuccessful or patient reports new pain  - Educate patient/family on pain management process including their role and importance of  reporting pain   - Provide non-pharmacologic/complimentary pain relief interventions  Outcome: Progressing     Problem: INFECTION - ADULT  Goal: Absence or prevention of progression during hospitalization  Description: INTERVENTIONS:  - Assess and monitor for signs and symptoms of infection  - Monitor lab/diagnostic results  - Monitor all insertion sites, i.e. indwelling lines,  tubes, and drains  - Monitor endotracheal if appropriate and nasal secretions for changes in amount and color  - Triangle appropriate cooling/warming therapies per order  - Administer medications as ordered  - Instruct and encourage patient and family to use good hand hygiene technique  - Identify and instruct in appropriate isolation precautions for identified infection/condition  Outcome: Progressing     Problem: SAFETY ADULT  Goal: Patient will remain free of falls  Description: INTERVENTIONS:  - Educate patient/family on patient safety including physical limitations  - Instruct patient to call for assistance with activity   - Consider consulting OT/PT to assist with strengthening/mobility based on AM PAC & -HLM score  - Consult OT/PT to assist with strengthening/mobility   - Keep Call bell within reach  - Keep bed low and locked with side rails adjusted as appropriate  - Keep care items and personal belongings within reach  - Initiate and maintain comfort rounds  - Make Fall Risk Sign visible to staff  - Offer Toileting every 2 Hours, in advance of need  - Initiate/Maintain bed alarm  - Obtain necessary fall risk management equipment  - Apply yellow socks and bracelet for high fall risk patients  - Consider moving patient to room near nurses station  Outcome: Progressing  Goal: Maintain or return to baseline ADL function  Description: INTERVENTIONS:  -  Assess patient's ability to carry out ADLs; assess patient's baseline for ADL function and identify physical deficits which impact ability to perform ADLs (bathing, care of mouth/teeth, toileting, grooming, dressing, etc.)  - Assess/evaluate cause of self-care deficits   - Assess range of motion  - Assess patient's mobility; develop plan if impaired  - Assess patient's need for assistive devices and provide as appropriate  - Encourage maximum independence but intervene and supervise when necessary  - Involve family in performance of ADLs  - Assess for home  care needs following discharge   - Consider OT consult to assist with ADL evaluation and planning for discharge  - Provide patient education as appropriate  - Monitor functional capacity and physical performance, use of AM PAC & JH-HLM   - Monitor gait, balance and fatigue with ambulation    Outcome: Progressing  Goal: Maintains/Returns to pre admission functional level  Description: INTERVENTIONS:  - Perform AM-PAC 6 Click Basic Mobility/ Daily Activity assessment daily.  - Set and communicate daily mobility goal to care team and patient/family/caregiver.   - Collaborate with rehabilitation services on mobility goals if consulted  - Perform Range of Motion 3 times a day.  - Reposition patient every 2 hours.  - Dangle patient 3 times a day  - Stand patient 3 times a day  - Ambulate patient 3 times a day  - Out of bed to chair 3 times a day   - Out of bed for meals 3 times a day  - Out of bed for toileting  - Record patient progress and toleration of activity level   Outcome: Progressing     Problem: DISCHARGE PLANNING  Goal: Discharge to home or other facility with appropriate resources  Description: INTERVENTIONS:  - Identify barriers to discharge w/patient and caregiver  - Arrange for needed discharge resources and transportation as appropriate  - Identify discharge learning needs (meds, wound care, etc.)  - Arrange for interpretive services to assist at discharge as needed  - Refer to Case Management Department for coordinating discharge planning if the patient needs post-hospital services based on physician/advanced practitioner order or complex needs related to functional status, cognitive ability, or social support system  Outcome: Progressing     Problem: Knowledge Deficit  Goal: Patient/family/caregiver demonstrates understanding of disease process, treatment plan, medications, and discharge instructions  Description: Complete learning assessment and assess knowledge base.  Interventions:  - Provide teaching  at level of understanding  - Provide teaching via preferred learning methods  Outcome: Progressing     Problem: RESPIRATORY - ADULT  Goal: Achieves optimal ventilation and oxygenation  Description: INTERVENTIONS:  - Assess for changes in respiratory status  - Assess for changes in mentation and behavior  - Position to facilitate oxygenation and minimize respiratory effort  - Oxygen administered by appropriate delivery if ordered  - Initiate smoking cessation education as indicated  - Encourage broncho-pulmonary hygiene including cough, deep breathe, Incentive Spirometry  - Assess the need for suctioning and aspirate as needed  - Assess and instruct to report SOB or any respiratory difficulty  - Respiratory Therapy support as indicated  Outcome: Progressing

## 2025-06-15 NOTE — ASSESSMENT & PLAN NOTE
Noted to be hypertensive at time of rapid response 6/10  Has underlying HTN and received 3L crystalloid  Is s/p IV Lasix 80 mg x1 (6/10), given additional 40 mg IV x 1 today  Blood pressure is well-controlled   Continue prehospital amlodipine, lisinopril, metoprolol  Continue as needed labetalol  Monitor BP closely per protocol, will adjust management accordingly.

## 2025-06-15 NOTE — PLAN OF CARE
Problem: Potential for Falls  Goal: Patient will remain free of falls  Description: INTERVENTIONS:  - Educate patient/family on patient safety including physical limitations  - Instruct patient to call for assistance with activity   - Consider consulting OT/PT to assist with strengthening/mobility based on AM PAC & JH-HLM score  - Consult OT/PT to assist with strengthening/mobility   - Keep Call bell within reach  - Keep bed low and locked with side rails adjusted as appropriate  - Keep care items and personal belongings within reach  - Initiate and maintain comfort rounds  - Make Fall Risk Sign visible to staff  - Offer Toileting every 2 Hours, in advance of need  - Initiate/Maintain bed alarm  - Obtain necessary fall risk management equipment  - Apply yellow socks and bracelet for high fall risk patients  - Consider moving patient to room near nurses station  Outcome: Progressing     Problem: PAIN - ADULT  Goal: Verbalizes/displays adequate comfort level or baseline comfort level  Description: Interventions:  - Encourage patient to monitor pain and request assistance  - Assess pain using appropriate pain scale  - Administer analgesics as ordered based on type and severity of pain and evaluate response  - Implement non-pharmacological measures as appropriate and evaluate response  - Consider cultural and social influences on pain and pain management  - Notify physician/advanced practitioner if interventions unsuccessful or patient reports new pain  - Educate patient/family on pain management process including their role and importance of  reporting pain   - Provide non-pharmacologic/complimentary pain relief interventions  Outcome: Progressing     Problem: INFECTION - ADULT  Goal: Absence or prevention of progression during hospitalization  Description: INTERVENTIONS:  - Assess and monitor for signs and symptoms of infection  - Monitor lab/diagnostic results  - Monitor all insertion sites, i.e. indwelling lines,  tubes, and drains  - Monitor endotracheal if appropriate and nasal secretions for changes in amount and color  - [place] appropriate cooling/warming therapies per order  - Administer medications as ordered  - Instruct and encourage patient and family to use good hand hygiene technique  - Identify and instruct in appropriate isolation precautions for identified infection/condition  Outcome: Progressing     Problem: SAFETY ADULT  Goal: Patient will remain free of falls  Description: INTERVENTIONS:  - Educate patient/family on patient safety including physical limitations  - Instruct patient to call for assistance with activity   - Consider consulting OT/PT to assist with strengthening/mobility based on AM PAC & -HLM score  - Consult OT/PT to assist with strengthening/mobility   - Keep Call bell within reach  - Keep bed low and locked with side rails adjusted as appropriate  - Keep care items and personal belongings within reach  - Initiate and maintain comfort rounds  - Make Fall Risk Sign visible to staff  - Offer Toileting every 2 Hours, in advance of need  - Initiate/Maintain bed alarm  - Obtain necessary fall risk management equipment  - Apply yellow socks and bracelet for high fall risk patients  - Consider moving patient to room near nurses station  Outcome: Progressing  Goal: Maintain or return to baseline ADL function  Description: INTERVENTIONS:  -  Assess patient's ability to carry out ADLs; assess patient's baseline for ADL function and identify physical deficits which impact ability to perform ADLs (bathing, care of mouth/teeth, toileting, grooming, dressing, etc.)  - Assess/evaluate cause of self-care deficits   - Assess range of motion  - Assess patient's mobility; develop plan if impaired  - Assess patient's need for assistive devices and provide as appropriate  - Encourage maximum independence but intervene and supervise when necessary  - Involve family in performance of ADLs  - Assess for home  care needs following discharge   - Consider OT consult to assist with ADL evaluation and planning for discharge  - Provide patient education as appropriate  - Monitor functional capacity and physical performance, use of AM PAC & JH-HLM   - Monitor gait, balance and fatigue with ambulation    Outcome: Progressing  Goal: Maintains/Returns to pre admission functional level  Description: INTERVENTIONS:  - Perform AM-PAC 6 Click Basic Mobility/ Daily Activity assessment daily.  - Set and communicate daily mobility goal to care team and patient/family/caregiver.   - Collaborate with rehabilitation services on mobility goals if consulted  - Perform Range of Motion 3 times a day.  - Reposition patient every 2 hours.  - Dangle patient 3 times a day  - Stand patient 3 times a day  - Ambulate patient 3 times a day  - Out of bed to chair 3 times a day   - Out of bed for meals 3 times a day  - Out of bed for toileting  - Record patient progress and toleration of activity level   Outcome: Progressing     Problem: DISCHARGE PLANNING  Goal: Discharge to home or other facility with appropriate resources  Description: INTERVENTIONS:  - Identify barriers to discharge w/patient and caregiver  - Arrange for needed discharge resources and transportation as appropriate  - Identify discharge learning needs (meds, wound care, etc.)  - Arrange for interpretive services to assist at discharge as needed  - Refer to Case Management Department for coordinating discharge planning if the patient needs post-hospital services based on physician/advanced practitioner order or complex needs related to functional status, cognitive ability, or social support system  Outcome: Progressing     Problem: Knowledge Deficit  Goal: Patient/family/caregiver demonstrates understanding of disease process, treatment plan, medications, and discharge instructions  Description: Complete learning assessment and assess knowledge base.  Interventions:  - Provide teaching  at level of understanding  - Provide teaching via preferred learning methods  Outcome: Progressing     Problem: RESPIRATORY - ADULT  Goal: Achieves optimal ventilation and oxygenation  Description: INTERVENTIONS:  - Assess for changes in respiratory status  - Assess for changes in mentation and behavior  - Position to facilitate oxygenation and minimize respiratory effort  - Oxygen administered by appropriate delivery if ordered  - Initiate smoking cessation education as indicated  - Encourage broncho-pulmonary hygiene including cough, deep breathe, Incentive Spirometry  - Assess the need for suctioning and aspirate as needed  - Assess and instruct to report SOB or any respiratory difficulty  - Respiratory Therapy support as indicated  Outcome: Progressing     Problem: SKIN/TISSUE INTEGRITY - ADULT  Goal: Skin Integrity remains intact(Skin Breakdown Prevention)  Description: Assess:  -Perform Micky assessment   -Clean and moisturize skin   -Inspect skin when repositioning, toileting, and assisting with ADLS  -Assess under medical devices  -Assess extremities for adequate circulation and sensation     Bed Management:  -Have minimal linens on bed & keep smooth, unwrinkled  -Change linens as needed when moist or perspiring  -Avoid sitting or lying in one position for more than 2 hours while in bed  -Keep HOB at 30 degrees     Toileting:  -Offer bedside commode  -Assess for incontinence  -Use incontinent care products after each incontinent episode    Activity:  -Mobilize patient 3 times a day  -Encourage activity and walks on unit  -Encourage or provide ROM exercises   -Turn and reposition patient every 2 Hours  -Use appropriate equipment to lift or move patient in bed  -Instruct/ Assist with weight shifting when out of bed in chair  -Consider limitation of chair time 2 hour intervals    Skin Care:  -Avoid use of baby powder, tape, friction and shearing, hot water or constrictive clothing  -Relieve pressure over bony  prominences  -Do not massage red bony areas    Next Steps:  -Teach patient strategies to minimize risks   -Consider consults to  interdisciplinary teams   Outcome: Progressing

## 2025-06-15 NOTE — ASSESSMENT & PLAN NOTE
6/10 rapid response for escalating O2 requirements after receiving volume resuscitation   See also plan for acute respiratory failure with hypoxia  6/11 TTE LVEF 55% with normal systolic function and normal wall motion.  Right ventricular normal systolic function  Patient is not on diuretics in the outpatient setting  Is s/p IV Lasix 80 mg x1 (6/10)  Have been monitoring fluid status closely throughout  Will give additional 40 mg Lasix x 1 today  Patient remains on 1 L nasal cannula oxygen has not been able to wean  6/13 repeat chest x-ray:Improving right lower lobe pneumonia.   Respiratory protocol continue to wean oxygen as able

## 2025-06-16 LAB
ANION GAP SERPL CALCULATED.3IONS-SCNC: 8 MMOL/L (ref 4–13)
BUN SERPL-MCNC: 14 MG/DL (ref 5–25)
CALCIUM SERPL-MCNC: 8.6 MG/DL (ref 8.4–10.2)
CHLORIDE SERPL-SCNC: 98 MMOL/L (ref 96–108)
CO2 SERPL-SCNC: 31 MMOL/L (ref 21–32)
CREAT SERPL-MCNC: 0.79 MG/DL (ref 0.6–1.3)
ERYTHROCYTE [DISTWIDTH] IN BLOOD BY AUTOMATED COUNT: 13.8 % (ref 11.6–15.1)
GFR SERPL CREATININE-BSD FRML MDRD: 87 ML/MIN/1.73SQ M
GLUCOSE SERPL-MCNC: 109 MG/DL (ref 65–140)
HCT VFR BLD AUTO: 39 % (ref 36.5–49.3)
HGB BLD-MCNC: 12.5 G/DL (ref 12–17)
MCH RBC QN AUTO: 30.2 PG (ref 26.8–34.3)
MCHC RBC AUTO-ENTMCNC: 32.1 G/DL (ref 31.4–37.4)
MCV RBC AUTO: 94 FL (ref 82–98)
PLATELET # BLD AUTO: 369 THOUSANDS/UL (ref 149–390)
PMV BLD AUTO: 10 FL (ref 8.9–12.7)
POTASSIUM SERPL-SCNC: 3.8 MMOL/L (ref 3.5–5.3)
RBC # BLD AUTO: 4.14 MILLION/UL (ref 3.88–5.62)
SODIUM SERPL-SCNC: 137 MMOL/L (ref 135–147)
WBC # BLD AUTO: 9.25 THOUSAND/UL (ref 4.31–10.16)

## 2025-06-16 PROCEDURE — 94761 N-INVAS EAR/PLS OXIMETRY MLT: CPT

## 2025-06-16 PROCEDURE — 85027 COMPLETE CBC AUTOMATED: CPT

## 2025-06-16 PROCEDURE — 94760 N-INVAS EAR/PLS OXIMETRY 1: CPT

## 2025-06-16 PROCEDURE — 94664 DEMO&/EVAL PT USE INHALER: CPT

## 2025-06-16 PROCEDURE — 99232 SBSQ HOSP IP/OBS MODERATE 35: CPT

## 2025-06-16 PROCEDURE — 80048 BASIC METABOLIC PNL TOTAL CA: CPT

## 2025-06-16 RX ORDER — KETOROLAC TROMETHAMINE 30 MG/ML
15 INJECTION, SOLUTION INTRAMUSCULAR; INTRAVENOUS EVERY 6 HOURS SCHEDULED
Status: COMPLETED | OUTPATIENT
Start: 2025-06-16 | End: 2025-06-17

## 2025-06-16 RX ADMIN — AMLODIPINE BESYLATE 5 MG: 5 TABLET ORAL at 08:47

## 2025-06-16 RX ADMIN — DICLOFENAC SODIUM 2 G: 10 GEL TOPICAL at 12:12

## 2025-06-16 RX ADMIN — Medication 1 TABLET: at 08:47

## 2025-06-16 RX ADMIN — APIXABAN 5 MG: 5 TABLET, FILM COATED ORAL at 17:25

## 2025-06-16 RX ADMIN — FLECAINIDE ACETATE 50 MG: 50 TABLET ORAL at 17:25

## 2025-06-16 RX ADMIN — FAMOTIDINE 20 MG: 20 TABLET, FILM COATED ORAL at 08:47

## 2025-06-16 RX ADMIN — CEFTRIAXONE SODIUM 1000 MG: 10 INJECTION, POWDER, FOR SOLUTION INTRAVENOUS at 15:45

## 2025-06-16 RX ADMIN — TAMSULOSIN HYDROCHLORIDE 0.4 MG: 0.4 CAPSULE ORAL at 17:25

## 2025-06-16 RX ADMIN — LISINOPRIL 10 MG: 10 TABLET ORAL at 08:47

## 2025-06-16 RX ADMIN — LATANOPROST 1 DROP: 50 SOLUTION OPHTHALMIC at 22:33

## 2025-06-16 RX ADMIN — LIDOCAINE 1 PATCH: 700 PATCH TOPICAL at 08:46

## 2025-06-16 RX ADMIN — GUAIFENESIN 1200 MG: 600 TABLET ORAL at 08:47

## 2025-06-16 RX ADMIN — APIXABAN 5 MG: 5 TABLET, FILM COATED ORAL at 08:47

## 2025-06-16 RX ADMIN — KETOROLAC TROMETHAMINE 15 MG: 30 INJECTION, SOLUTION INTRAMUSCULAR at 23:48

## 2025-06-16 RX ADMIN — KETOROLAC TROMETHAMINE 15 MG: 30 INJECTION, SOLUTION INTRAMUSCULAR at 12:12

## 2025-06-16 RX ADMIN — GUAIFENESIN 1200 MG: 600 TABLET ORAL at 17:25

## 2025-06-16 RX ADMIN — METOPROLOL SUCCINATE 25 MG: 25 TABLET, EXTENDED RELEASE ORAL at 08:47

## 2025-06-16 RX ADMIN — DICLOFENAC SODIUM 2 G: 10 GEL TOPICAL at 22:33

## 2025-06-16 RX ADMIN — FAMOTIDINE 20 MG: 20 TABLET, FILM COATED ORAL at 17:25

## 2025-06-16 RX ADMIN — PRAVASTATIN SODIUM 20 MG: 20 TABLET ORAL at 08:47

## 2025-06-16 RX ADMIN — KETOROLAC TROMETHAMINE 15 MG: 30 INJECTION, SOLUTION INTRAMUSCULAR at 17:26

## 2025-06-16 RX ADMIN — ACETAMINOPHEN 650 MG: 325 TABLET ORAL at 11:34

## 2025-06-16 RX ADMIN — FLECAINIDE ACETATE 50 MG: 50 TABLET ORAL at 08:47

## 2025-06-16 NOTE — ASSESSMENT & PLAN NOTE
Patient reported feeling weaker than baseline, unsteady on his feet  PT/OT reevaluated patient (6/14): patient is appropriate for outpatient PT OT on discharge  To re-eval with worsening arthritic knee pain  Trial toradol 15mg q6 x 1 day & topical agents  Fall precautions  Case management continues to follow for discharge planning

## 2025-06-16 NOTE — RESPIRATORY THERAPY NOTE
Home Oxygen Qualifying Test     Patient name: Ramon Keith        : 1950   Date of Test:  2025  Diagnosis:    Home Oxygen Test:    **Medicare Guidelines require item(s) 1-5 on all ambulatory patients or 1 and 2 on non-ambulatory patients.    1. Baseline SPO2 on Room Air at rest  90  %   If <= 88% on Room Air add O2 via NC to obtain SpO2 >=88%. If LPM needed, document LPM  needed to reach =>88%    SPO2 during exertion on Room Air  91 - 93   %  During exertion monitor SPO2. If SPO2 increases >=89%, do not add supplemental oxygen  No Oxtgen applied  SPO2 on Oxygen at Rest  % at  LPM    SPO2 during exertion on Oxygen  % at  LPM    Test performed during exertion activity.     []  Supplemental Home Oxygen is indicated.    [x]  Client does not qualify for home oxygen.    Respiratory Additional Notes-  Patient was only able to stand and sit three times   with the assistance on RT and DARIAN Lee.  Patient states his knees hurt today.  His Spo2 did not drop below 90 %.  Hospitalist and Roberth from Case Management aware    Danelle Steward, RT

## 2025-06-16 NOTE — ASSESSMENT & PLAN NOTE
Noted to be hypertensive at time of rapid response 6/10  Has underlying HTN and received 3L crystalloid  Is s/p IV Lasix 80 mg x1 (6/10), given additional 40 mg IV x 1 (6/15)  Trends now well controlled   Continue prehospital amlodipine, lisinopril, metoprolol  Continue as needed labetalol

## 2025-06-16 NOTE — ASSESSMENT & PLAN NOTE
Present on arrival to ED   AEB tachycardia, tachypnea, leukocytosis. Now resolved  Is s/p IV fluid resuscitation (6/10)-flash pulmonary edema afterwards see plan for same  Initial work-up to include (6/10):   Chest x-ray: right lower lobe pneumonia  COVID/flu/RSV negative. Procalcitonin 0.62. Lactic acid 1.0  Urinary antigens negative  CT chest abdomen pelvis noted right lower lobe pneumonia and small right pleural effusion, also 5 mm lung nodule left lung and adrenal nodule-will need outpatient follow-up  Follow-up chest x-ray (6/13): noted improvement in pneumonia  Patient weaned off O2 and remains stable on room air. Home oxygen study completed, does not qualify for home oxygen.   Continue IV Rocephin, plan to treat for total of 7 days.   Dispo: level III. For repeat PT/OT evaluation given bilateral knee pain with worsening ambulation today

## 2025-06-16 NOTE — PLAN OF CARE
Problem: Potential for Falls  Goal: Patient will remain free of falls  Description: INTERVENTIONS:  - Educate patient/family on patient safety including physical limitations  - Instruct patient to call for assistance with activity   - Consider consulting OT/PT to assist with strengthening/mobility based on AM PAC & JH-HLM score  - Consult OT/PT to assist with strengthening/mobility   - Keep Call bell within reach  - Keep bed low and locked with side rails adjusted as appropriate  - Keep care items and personal belongings within reach  - Initiate and maintain comfort rounds  - Make Fall Risk Sign visible to staff  - Offer Toileting every 2 Hours, in advance of need  - Initiate/Maintain bed alarm  - Obtain necessary fall risk management equipment  - Apply yellow socks and bracelet for high fall risk patients  - Consider moving patient to room near nurses station  Outcome: Progressing     Problem: PAIN - ADULT  Goal: Verbalizes/displays adequate comfort level or baseline comfort level  Description: Interventions:  - Encourage patient to monitor pain and request assistance  - Assess pain using appropriate pain scale  - Administer analgesics as ordered based on type and severity of pain and evaluate response  - Implement non-pharmacological measures as appropriate and evaluate response  - Consider cultural and social influences on pain and pain management  - Notify physician/advanced practitioner if interventions unsuccessful or patient reports new pain  - Educate patient/family on pain management process including their role and importance of  reporting pain   - Provide non-pharmacologic/complimentary pain relief interventions  Outcome: Progressing     Problem: INFECTION - ADULT  Goal: Absence or prevention of progression during hospitalization  Description: INTERVENTIONS:  - Assess and monitor for signs and symptoms of infection  - Monitor lab/diagnostic results  - Monitor all insertion sites, i.e. indwelling lines,  tubes, and drains  - Monitor endotracheal if appropriate and nasal secretions for changes in amount and color  - [place] appropriate cooling/warming therapies per order  - Administer medications as ordered  - Instruct and encourage patient and family to use good hand hygiene technique  - Identify and instruct in appropriate isolation precautions for identified infection/condition  Outcome: Progressing     Problem: SAFETY ADULT  Goal: Patient will remain free of falls  Description: INTERVENTIONS:  - Educate patient/family on patient safety including physical limitations  - Instruct patient to call for assistance with activity   - Consider consulting OT/PT to assist with strengthening/mobility based on AM PAC & -HLM score  - Consult OT/PT to assist with strengthening/mobility   - Keep Call bell within reach  - Keep bed low and locked with side rails adjusted as appropriate  - Keep care items and personal belongings within reach  - Initiate and maintain comfort rounds  - Make Fall Risk Sign visible to staff  - Offer Toileting every 2 Hours, in advance of need  - Initiate/Maintain bed alarm  - Obtain necessary fall risk management equipment  - Apply yellow socks and bracelet for high fall risk patients  - Consider moving patient to room near nurses station  Outcome: Progressing  Goal: Maintain or return to baseline ADL function  Description: INTERVENTIONS:  -  Assess patient's ability to carry out ADLs; assess patient's baseline for ADL function and identify physical deficits which impact ability to perform ADLs (bathing, care of mouth/teeth, toileting, grooming, dressing, etc.)  - Assess/evaluate cause of self-care deficits   - Assess range of motion  - Assess patient's mobility; develop plan if impaired  - Assess patient's need for assistive devices and provide as appropriate  - Encourage maximum independence but intervene and supervise when necessary  - Involve family in performance of ADLs  - Assess for home  care needs following discharge   - Consider OT consult to assist with ADL evaluation and planning for discharge  - Provide patient education as appropriate  - Monitor functional capacity and physical performance, use of AM PAC & JH-HLM   - Monitor gait, balance and fatigue with ambulation    Outcome: Progressing  Goal: Maintains/Returns to pre admission functional level  Description: INTERVENTIONS:  - Perform AM-PAC 6 Click Basic Mobility/ Daily Activity assessment daily.  - Set and communicate daily mobility goal to care team and patient/family/caregiver.   - Collaborate with rehabilitation services on mobility goals if consulted  - Perform Range of Motion 3 times a day.  - Reposition patient every 2 hours.  - Dangle patient 3 times a day  - Stand patient 3 times a day  - Ambulate patient 3 times a day  - Out of bed to chair 3 times a day   - Out of bed for meals 3 times a day  - Out of bed for toileting  - Record patient progress and toleration of activity level   Outcome: Progressing     Problem: DISCHARGE PLANNING  Goal: Discharge to home or other facility with appropriate resources  Description: INTERVENTIONS:  - Identify barriers to discharge w/patient and caregiver  - Arrange for needed discharge resources and transportation as appropriate  - Identify discharge learning needs (meds, wound care, etc.)  - Arrange for interpretive services to assist at discharge as needed  - Refer to Case Management Department for coordinating discharge planning if the patient needs post-hospital services based on physician/advanced practitioner order or complex needs related to functional status, cognitive ability, or social support system  Outcome: Progressing     Problem: Knowledge Deficit  Goal: Patient/family/caregiver demonstrates understanding of disease process, treatment plan, medications, and discharge instructions  Description: Complete learning assessment and assess knowledge base.  Interventions:  - Provide teaching  at level of understanding  - Provide teaching via preferred learning methods  Outcome: Progressing     Problem: RESPIRATORY - ADULT  Goal: Achieves optimal ventilation and oxygenation  Description: INTERVENTIONS:  - Assess for changes in respiratory status  - Assess for changes in mentation and behavior  - Position to facilitate oxygenation and minimize respiratory effort  - Oxygen administered by appropriate delivery if ordered  - Initiate smoking cessation education as indicated  - Encourage broncho-pulmonary hygiene including cough, deep breathe, Incentive Spirometry  - Assess the need for suctioning and aspirate as needed  - Assess and instruct to report SOB or any respiratory difficulty  - Respiratory Therapy support as indicated  Outcome: Progressing     Problem: SKIN/TISSUE INTEGRITY - ADULT  Goal: Skin Integrity remains intact(Skin Breakdown Prevention)  Description: Assess:  -Perform Micky assessment   -Clean and moisturize skin   -Inspect skin when repositioning, toileting, and assisting with ADLS  -Assess under medical devices  -Assess extremities for adequate circulation and sensation     Bed Management:  -Have minimal linens on bed & keep smooth, unwrinkled  -Change linens as needed when moist or perspiring  -Avoid sitting or lying in one position for more than 2 hours while in bed  -Keep HOB at 30 degrees     Toileting:  -Offer bedside commode  -Assess for incontinence  -Use incontinent care products after each incontinent episode    Activity:  -Mobilize patient 3 times a day  -Encourage activity and walks on unit  -Encourage or provide ROM exercises   -Turn and reposition patient every 2 Hours  -Use appropriate equipment to lift or move patient in bed  -Instruct/ Assist with weight shifting when out of bed in chair  -Consider limitation of chair time 2 hour intervals    Skin Care:  -Avoid use of baby powder, tape, friction and shearing, hot water or constrictive clothing  -Relieve pressure over bony  prominences  -Do not massage red bony areas    Next Steps:  -Teach patient strategies to minimize risks   -Consider consults to  interdisciplinary teams   Outcome: Progressing

## 2025-06-16 NOTE — ASSESSMENT & PLAN NOTE
Is s/p successful cardioversion in past, however currently in AF (rate-controlled)  Continue home AC w/Eliquis  Continue home metoprolol for rate control  Patient had an incident of elevated heart rate 6/12 AM- EKG revealed atrial tachycardia with rapid ventricular response heart rate 173.  Per nursing, patient had no chest pain at that time. He was receiving a.m. care at the time likely related to exertion. No further incidences reported.   No further episodes of heart rate acceleration

## 2025-06-16 NOTE — UTILIZATION REVIEW
Continued Stay Review    Date: 6/15/25 and 6/16/25                           Current Patient Class: Inpatient  Current Level of Care: [facility]     HPI:74 y.o. male initially admitted on 6/10/25      Current Diagnosis: Acute Respiratory failure with hypoxia.     6/15/25 Assessment/Plan: Hospitalist: pt reports cough improved, still short of breath on exertion. Remains on 1 L NC oxygen and has been unable to wean. SIRS  parameters all resolved. Chest xray on 6/13 noted improvement in pneumonia.  Giving Lasix 40 mg x1 IV dose today. Wean oxygen as able. Encourage mobility, I-S, out of bed to chair, repeat BMP in AM, C/w  IV Ceftriaxone today.     Certification Statement: The patient will continue to require additional inpatient hospital stay due to continuing to try to wean oxygen, continuing IV antibiotics for CAP, will give additional IV Lasix dose today and monitor fluid status closely repeat labs in the a.m.     6/16/25 Hospitalist: Uop 1900 ml. On room air. Had home oyxgen study does not qualify for home oxygen. Note with worsening ambulatory dysfunction while attempting home oxygen test requiring assistance x 2. Notes his knee pain typical for his arthritis is worsened today. Requested re eval for PT/OT. Trial toradol 15mg q6 x 1 day & topical agents, C/w IV Ceftriaxone.      Certification Statement: The patient will continue to require additional inpatient hospital stay due to knee pain limiting ambulation, IV abx, repeat PT/OT eval       Medications:   Scheduled Medications:  amLODIPine, 5 mg, Oral, Daily  apixaban, 5 mg, Oral, BID  cefTRIAXone, 1,000 mg, Intravenous, Q24H  Diclofenac Sodium, 2 g, Topical, 4x Daily  famotidine, 20 mg, Oral, BID  flecainide, 50 mg, Oral, BID  guaiFENesin, 1,200 mg, Oral, BID  ketorolac, 15 mg, Intravenous, Q6H HORTENCIA  latanoprost, 1 drop, Both Eyes, HS  lidocaine, 1 patch, Topical, Daily  lisinopril, 10 mg, Oral, Daily  metoprolol succinate, 25 mg, Oral,  Daily  multivitamin-minerals, 1 tablet, Oral, Daily  pravastatin, 20 mg, Oral, Daily  tamsulosin, 0.4 mg, Oral, Daily With Dinner      Continuous IV Infusions: none      PRN Meds:  acetaminophen, 650 mg, Oral, Q6H PRN  albuterol, 2.5 mg, Nebulization, Q4H PRN  labetalol, 10 mg, Intravenous, Q4H PRN  ondansetron, 4 mg, Intravenous, Q6H PRN  polyethylene glycol, 17 g, Oral, Daily PRN      Discharge Plan: TBD     Vital Signs (last 3 days)       Date/Time Temp Pulse Resp BP MAP (mmHg) SpO2 Calculated FIO2 (%) - Nasal Cannula Nasal Cannula O2 Flow Rate (L/min) O2 Device Patient Position - Orthostatic VS [place] Coma Scale Score Pain    06/16/25 14:37:16 98.4 °F (36.9 °C) 81 16 109/62 78 94 % 24 1 L/min Nasal cannula Lying -- --    06/16/25 1244 -- -- -- -- -- 92 % -- -- -- -- 15 --    06/16/25 1212 -- -- -- -- -- -- -- -- -- -- -- Med Not Given for Pain - for MAR use only    06/16/25 1134 -- -- -- -- -- -- -- -- -- -- -- 3    06/16/25 07:17:33 98.7 °F (37.1 °C) 92 16 137/71 93 88 % -- -- None (Room air) Lying -- --    06/16/25 0708 -- -- -- -- -- 91 % -- -- None (Room air) -- -- --    06/15/25 22:56:32 98.8 °F (37.1 °C) 91 17 129/61 84 90 % -- -- -- -- -- --    06/15/25 2100 -- -- -- -- -- -- -- -- -- -- 15 No Pain    06/15/25 14:29:19 98.1 °F (36.7 °C) 82 20 110/65 80 91 % -- -- -- -- -- --    06/15/25 1120 -- -- -- -- -- -- 24 1 L/min Nasal cannula -- 15 No Pain    06/15/25 07:19:30 98.1 °F (36.7 °C) 79 20 134/69 91 93 % -- -- -- -- -- --    06/15/25 0709 -- -- -- -- -- 90 % 24 1 L/min Nasal cannula -- -- --    06/14/25 2252 -- -- -- -- -- 94 % 24 1 L/min Nasal cannula -- -- --    06/14/25 2240 -- -- -- -- -- 84 %  -- -- -- -- -- --    06/14/25 22:19:40 98.3 °F (36.8 °C) 89 24 147/68 94 89 % -- -- None (Room air) -- -- --    06/14/25 2140 -- -- -- -- -- -- -- -- -- -- 15 No Pain    06/14/25 15:17:42 98.2 °F (36.8 °C) 81 -- 154/78 103 91 % -- -- -- -- -- --    06/14/25 15:17:27 98.2 °F (36.8 °C) 79 -- 154/78 103 87 % --  -- -- -- -- --    06/14/25 1101 97.3 °F (36.3 °C) 81 23 137/73 99 96 % -- -- Nasal cannula Sitting -- --    06/14/25 1100 -- 81 45 137/73 99 -- -- -- -- Sitting -- --    06/14/25 0940 -- -- -- -- -- -- -- -- -- -- -- No Pain    06/14/25 0900 -- 96 28 162/72 104 96 % 24 1 L/min Nasal cannula Sitting -- --    06/14/25 0856 -- 91 -- 162/72 -- -- -- -- -- -- -- --    06/14/25 0800 -- -- -- -- -- -- -- -- -- -- 15 --    06/14/25 0706 97.6 °F (36.4 °C) 81 21 134/60 87 94 % 24 1 L/min Nasal cannula Lying -- --    06/14/25 0200 -- 82 20 -- -- 93 % 24 1 L/min Nasal cannula Lying -- --    06/14/25 0100 -- 85 20 -- -- 94 % 24 1 L/min Nasal cannula Lying -- --    06/14/25 0000 -- 91 20 -- -- 95 % 24 1 L/min Nasal cannula Lying -- --    06/13/25 2300 -- 88 20 -- -- 94 % 24 1 L/min Nasal cannula Lying -- --    06/13/25 2200 -- 84 20 -- -- 96 % 24 1 L/min Nasal cannula Lying -- --    06/13/25 2100 -- 81 22 -- -- 94 % 24 1 L/min Nasal cannula Lying -- --    06/13/25 2000 98 °F (36.7 °C) 84 20 148/68 100 90 % -- -- None (Room air) Lying 15 No Pain    06/13/25 1900 -- 77 20 -- -- 93 % 24 1 L/min Nasal cannula Lying -- --    06/13/25 1506 -- 81 26 -- -- 96 % 24 1 L/min  Nasal cannula -- -- --    06/13/25 1500 97.6 °F (36.4 °C) 87 30 144/65 94 96 % -- -- -- Sitting -- --    06/13/25 1100 97.3 °F (36.3 °C) 91 31 154/75 107 96 % 28 2 L/min Nasal cannula Sitting -- --    06/13/25 1030 -- -- -- -- -- -- -- -- -- -- -- 5    06/13/25 0900 -- 100 28 158/74 106 94 % -- -- -- Lying -- --    06/13/25 0847 -- -- -- -- -- -- -- -- -- -- -- Med Not Given for Pain - for MAR use only    06/13/25 0800 -- -- -- -- -- -- -- -- -- -- 15 --    06/13/25 0700 99.5 °F (37.5 °C) 105 44 171/91 124 92 % 28 2 L/min Nasal cannula Lying -- --    06/13/25 0400 99 °F (37.2 °C) 87 18 161/76 109 96 % -- -- -- -- -- --    06/13/25 0300 -- 84 27 -- -- 95 % -- -- -- -- -- --    06/13/25 0000 -- 82 18 114/70 87 94 % -- -- -- -- -- --          Weight (last 2 days)        Date/Time Weight    06/16/25 0540 88.7 (195.55)    06/15/25 0540 89.7 (197.75)    06/15/25 0510 89.7 (197.75)    06/14/25 0600 93.2 (205.47)            Pertinent Labs/Diagnostic Results:   Radiology:  XR chest portable   Final Interpretation by Paige Jackson MD (06/13 1514)      Improving right lower lobe pneumonia.            Workstation performed: [id number]         XR chest portable ICU   Final Interpretation by Mery Clark MD (06/11 0807)      Improving right lower lobe pneumonia.            Workstation performed: [id number]         CT chest abdomen pelvis w contrast   Final Interpretation by Saran Kiran MD (06/10 1051)      1. Significant right lower lobe pneumonia and small right pleural effusion. Mediastinal and hilar lymph nodes are likely reactive. Follow-up noncontrast chest CT is recommended in 3 months.      2. Solid 5 mm left upper lobe pulmonary nodule. The nodule will be reassessed on the aforementioned CT.      3. 1.8 cm right adrenal nodule. Although its imaging features are indeterminate, it does not have suspicious imaging features (heterogeneity, necrosis, irregular margins), therefore this is likely benign, and can be followed by non-contrast abdomen CT or    MRI in 12 months. Please note that for adrenal nodule > 1 cm,  biochemical evaluation is suggested to rule out functioning adenoma, if not already performed. Considerations related to the patient's age and/or comorbidities may be used to alter these    recommendations.      The study was marked in EPIC for immediate notification.      Resident: ZEE MALAVE I, the attending radiologist, have reviewed the images and agree with the final report above.      Workstation performed: [id number]         XR chest 1 view portable   ED Interpretation by Brody Riggs MD (06/10 1058)   Right side Pneumonia       Final Interpretation by Khris Gonzalez MD (06/10 1615)      Right lower lobe pneumonia.            Workstation  performed: [id number]           Cardiology:  ECG 12 lead   Final Result by Lane Menchaca MD (06/12 0853)   Atrial tachycardia with rapid ventricular response   Left axis deviation   Low voltage QRS   Nonspecific ST-t wave changes   When compared with ECG of 10-Nain-2025 17:59,   Vent. rate has increased by  79 bpm      Confirmed by Lane Menchaca ([id number]) on 6/12/2025 8:53:38 AM      Echo complete w/ contrast if indicated   Final Result by Lane Menchaca MD (06/11 0832)        Left Ventricle: Left ventricular cavity size is normal. Wall thickness    is normal. The left ventricular ejection fraction is 55%. Systolic    function is normal. Wall motion is normal.     Right Ventricle: Right ventricular cavity size is normal. Systolic    function is normal.     Left Atrium: The atrium is dilated.         ECG 12 lead   Final Result by Lane Menchaca MD (06/11 0836)   Atrial fibrillation   Controlled ventricular response   Nonspecific ST-t wave changes   when compared to  10-Nain-2025 08:24,   No significant change was found      Confirmed by Lane Menchaca ([id number]) on 6/11/2025 8:36:01 AM      ECG 12 lead   Final Result by Lane Menchaca MD (06/10 1104)   Atrial fibrillation   Rightward axis   Intraventricular conduction delay   When compared with ECG of 02-Jan-2023 14:14,   Vent. rate has increased by  35 bpm   Confirmed by Lane Menchaca ([id number]) on 6/10/2025 11:04:09 AM        GI:  No orders to display       Results from last 7 days   Lab Units 06/10/25  0836   SARS-COV-2  Negative     Results from last 7 days   Lab Units 06/16/25  0539 06/15/25  0507 06/14/25  0426 06/13/25  0317 06/12/25  0505 06/10/25  1623 06/10/25  0836   WBC Thousand/uL 9.25 6.50 8.26 10.97* 12.37*   < > 12.59*   HEMOGLOBIN g/dL 12.5 11.9* 11.7* 10.8* 11.9*   < > 12.3   I STAT HEMOGLOBIN   --   --   --   --   --    < >  --    HEMATOCRIT % 39.0 37.3 36.8 34.9* 37.6   < > 38.5   HEMATOCRIT, ISTAT   --   --   --   --   --    < >  --   "  PLATELETS Thousands/uL 369 318 269 230 229   < > 223   TOTAL NEUT ABS Thousands/µL  --   --   --   --   --   --  10.89*    < > = values in this interval not displayed.         Results from last 7 days   Lab Units 06/16/25  0539 06/15/25  0507 06/14/25 0426 06/13/25  0317 06/12/25  0505 06/11/25  0426 06/10/25  1729 06/10/25  1623   SODIUM mmol/L 137 140 138 137 136 135 137  --    POTASSIUM mmol/L 3.8 3.5 3.3* 3.9 4.3 3.9 4.2  --    CHLORIDE mmol/L 98 100 101 101 100 101 103  --    CO2 mmol/L 31 33* 29 29 28 25 25  --    CO2, I-STAT mmol/L  --   --   --   --   --   --   --  22   ANION GAP mmol/L 8 7 8 7 8 9 9  --    BUN mg/dL 14 12 11 13 15 15 14  --    CREATININE mg/dL 0.79 0.62 0.64 0.73 0.82 1.01 1.05  --    EGFR ml/min/1.73sq m 87 96 95 90 85 71 68  --    CALCIUM mg/dL 8.6 8.8 8.2* 8.6 9.0 8.5 8.3*  --    CALCIUM, IONIZED mmol/L  --   --   --   --  1.08* 1.06* 1.01*  --    CALCIUM, IONIZED, ISTAT mmol/L  --   --   --   --   --   --   --  1.16   MAGNESIUM mg/dL  --   --   --   --  2.3 1.8* 1.9  --    PHOSPHORUS mg/dL  --   --   --   --  3.3 2.4 3.1  --      Results from last 7 days   Lab Units 06/10/25  1729 06/10/25  0836   AST U/L 14 14   ALT U/L 11 10   ALK PHOS U/L 54 55   TOTAL PROTEIN g/dL 6.8 6.8   ALBUMIN g/dL 4.0 4.1   TOTAL BILIRUBIN mg/dL 1.29* 1.59*         Results from last 7 days   Lab Units 06/16/25  0539 06/15/25  0507 06/14/25 0426 06/13/25  0317 06/12/25  0505 06/11/25  0426 06/10/25  1729 06/10/25  0836   GLUCOSE RANDOM mg/dL 109 104 106 137 131 126 125 125         Results from last 7 days   Lab Units 06/11/25  0426   HEMOGLOBIN A1C % 6.0*   EAG mg/dl 126     No results found for: \"BETA-HYDROXYBUTYRATE\"           Results from last 7 days   Lab Units 06/10/25  1623   I STAT BASE EXC mmol/L -5*   I STAT O2 SAT % 98*   ISTAT PH ART  7.302*   I STAT ART PCO2 mm HG 42.3   I STAT ART PO2 mm .0   I STAT ART HCO3 mmol/L 20.9*         Results from last 7 days   Lab Units 06/10/25  1729   HS TNI " 0HR ng/L 16         Results from last 7 days   Lab Units 06/10/25  0836   PROTIME seconds 26.5*   INR  2.41*   PTT seconds 49*         Results from last 7 days   Lab Units 06/12/25  0505 06/11/25  0426 06/10/25  0836   PROCALCITONIN ng/ml 0.95* 0.91* 0.62*     Results from last 7 days   Lab Units 06/10/25  0836   LACTIC ACID mmol/L 1.0             Results from last 7 days   Lab Units 06/10/25  1504   BNP pg/mL 409*                                     Results from last 7 days   Lab Units 06/10/25  0957   CLARITY UA  Clear   COLOR UA  Yellow   SPEC GRAV UA  1.010   PH UA  6.0   GLUCOSE UA mg/dl Negative   KETONES UA mg/dl 15 (1+)*   BLOOD UA  Negative   PROTEIN UA mg/dl Trace*   NITRITE UA  Negative   BILIRUBIN UA  Negative   UROBILINOGEN UA E.U./dl 0.2   LEUKOCYTES UA  Negative   WBC UA /hpf 0-1   RBC UA /hpf None Seen   BACTERIA UA /hpf Occasional   EPITHELIAL CELLS WET PREP /hpf Occasional     Results from last 7 days   Lab Units 06/10/25  1503 06/10/25  0836   STREP PNEUMONIAE ANTIGEN, URINE  Negative  --    LEGIONELLA URINARY ANTIGEN  Negative  --    INFLUENZA A PCR   --  Negative   INFLUENZA B PCR   --  Negative   RSV PCR   --  Negative                             Results from last 7 days   Lab Units 06/10/25  0836   BLOOD CULTURE  No Growth After 5 Days.  No Growth After 5 Days.                   Network Utilization Review Department  ATTENTION: Please call with any questions or concerns to [phone] and carefully listen to the prompts so that you are directed to the right person. All voicemails are confidential.   For Discharge needs, contact Care Management DC Support Team at [phone] opt. 2  Send all requests for admission clinical reviews, approved or denied determinations and any other requests to dedicated fax number below belonging to the campus where the patient is receiving treatment. List of dedicated fax numbers for the Facilities:  FACILITY NAME UR FAX NUMBER   ADMISSION DENIALS  (Administrative/Medical Necessity) [phone]   DISCHARGE SUPPORT TEAM (NETWORK) [phone]   PARENT CHILD HEALTH (Maternity/NICU/Pediatrics) [phone]   [facility] [phone]   [facility] [phone]   [facility] [phone]   General acute hospital [phone]   [facility] [phone]   [facility] [phone]   [facility] [phone]   [facility] [phone]   [facility] [phone]   [facility] [phone]   [facility] [phone]   [facility] [phone]

## 2025-06-16 NOTE — PROGRESS NOTES
Progress Note - Hospitalist   Name: Ramon Keith 74 y.o. male I MRN: [id number]  Unit/Bed#: -01 I Date of Admission: 6/10/2025   Date of Service: 6/16/2025 I Hospital Day: 6    Assessment & Plan  Sepsis due to pneumonia (HCC)  Present on arrival to ED   AEB tachycardia, tachypnea, leukocytosis. Now resolved  Is s/p IV fluid resuscitation (6/10)-flash pulmonary edema afterwards see plan for same  Initial work-up to include (6/10):   Chest x-ray: right lower lobe pneumonia  COVID/flu/RSV negative. Procalcitonin 0.62. Lactic acid 1.0  Urinary antigens negative  CT chest abdomen pelvis noted right lower lobe pneumonia and small right pleural effusion, also 5 mm lung nodule left lung and adrenal nodule-will need outpatient follow-up  Follow-up chest x-ray (6/13): noted improvement in pneumonia  Patient weaned off O2 and remains stable on room air. Home oxygen study completed, does not qualify for home oxygen.   Continue IV Rocephin, plan to treat for total of 7 days.   Dispo: level III. For repeat PT/OT evaluation given bilateral knee pain with worsening ambulation today   Acute respiratory failure with hypoxia (HCC)  Admitted 6/10 under SLIM for sepsis 2/2 RLL pneumonia w/only 2L NC requirement   S/p IV fluid resuscitation which resulted in flash pulmonary edema see plan for same\r  See also plan for sepsis due to pneumonia  Have been monitoring fluid status closely - patient given one-time dose of 40 mg IV Lasix 6/15    Encourage mobility, I-S, out of bed to chair  Patient stable on room air - will continue to adjust as needed per respiratory protocol  Atrial fibrillation (HCC)  Is s/p successful cardioversion in past, however currently in AF (rate-controlled)  Continue home AC w/Eliquis  Continue home metoprolol for rate control  Patient had an incident of elevated heart rate 6/12 AM- EKG revealed atrial tachycardia with rapid ventricular response heart rate 173.  Per nursing, patient had no chest pain at  that time. He was receiving a.m. care at the time likely related to exertion. No further incidences reported.   No further episodes of heart rate acceleration    Flash pulmonary edema (HCC)  6/10 rapid response for escalating O2 requirements after receiving volume resuscitation   See also plan for acute respiratory failure with hypoxia  6/11 TTE LVEF 55% with normal systolic function and normal wall motion.  Right ventricular normal systolic function  Patient is not on diuretics in the outpatient setting  Is s/p IV Lasix 80 mg x1 (6/10), plus additional dose of 40 mg Lasix (6/15)  Repeat chest x-ray (6/13): Improving right lower lobe pneumonia.   Patient currently stable on room air - continue to monitor and adjust as needed.   Continue to monitor fluid status until discharge.   Hypertensive urgency  Noted to be hypertensive at time of rapid response 6/10  Has underlying HTN and received 3L crystalloid  Is s/p IV Lasix 80 mg x1 (6/10), given additional 40 mg IV x 1 (6/15)  Trends now well controlled   Continue prehospital amlodipine, lisinopril, metoprolol  Continue as needed labetalol  Hyperlipidemia  Continue statin  Pulmonary nodule  Imaging revealed solid 5 mm left upper lobe pulmonary nodule  Will require follow-up w/repeat imaging as OP  Adrenal nodule (HCC)  Imaging revealed 1.8 cm right adrenal nodule  Will require follow-up as OP w/either non-contrast abdomen CT or MRI in 12 months  BPH (benign prostatic hyperplasia)  Continue home Flomax  Monitor urinary output  Generalized weakness  Patient reported feeling weaker than baseline, unsteady on his feet  PT/OT reevaluated patient (6/14): patient is appropriate for outpatient PT OT on discharge  To re-eval with worsening arthritic knee pain  Trial toradol 15mg q6 x 1 day & topical agents  Fall precautions  Case management continues to follow for discharge planning  Hypokalemia  Monitor & replete prn    VTE Pharmacologic Prophylaxis: VTE Score: 9 High Risk  (Score >/= 5) - Pharmacological DVT Prophylaxis Ordered: apixaban (Eliquis). Sequential Compression Devices Ordered.    Mobility:   Basic Mobility Inpatient Raw Score: 17  JH-HLM Goal: 5: Stand one or more mins  JH-HLM Achieved: 7: Walk 25 feet or more  JH-HLM Goal achieved. Continue to encourage appropriate mobility.    Patient Centered Rounds: I performed bedside rounds with nursing staff today.   Discussions with Specialists or Other Care Team Provider: case management, RT    Education and Discussions with Family / Patient: Updated  (daughter) at bedside.    Current Length of Stay: 6 day(s)  Current Patient Status: Inpatient   Certification Statement: The patient will continue to require additional inpatient hospital stay due to knee pain limiting ambulation, IV abx, repeat PT/OT eval  Discharge Plan: Anticipate discharge tomorrow to discharge location to be determined pending rehab evaluations.    Code Status: Level 1 - Full Code    Subjective   74 y.o. seen and examined today. No acute changes reported overnight. Patient reports that he is feeling well. He endorses on-and-off lightheadedness with exertion, but denies any current lightheadedness while laying in bed. He denies chest pain, palpitations, or SOB. He is ambulating with assistance, and denies any urinary or bowel issues.    Note with worsening ambulatory dysfunction while attempting home oxygen test requiring assistance x 2. Notes his knee pain typical for his arthritis is worsened today    Objective :  Temp:  [98.1 °F (36.7 °C)-98.8 °F (37.1 °C)] 98.7 °F (37.1 °C)  HR:  [82-92] 92  BP: (110-137)/(61-71) 137/71  Resp:  [16-20] 16  SpO2:  [88 %-91 %] 88 %  O2 Device: None (Room air)  Nasal Cannula O2 Flow Rate (L/min):  [1 L/min] 1 L/min    Body mass index is 27.27 kg/m².     Input and Output Summary (last 24 hours):     Intake/Output Summary (Last 24 hours) at 6/16/2025 0903  Last data filed at 6/16/2025 0900  Gross per 24 hour   Intake  1300 ml   Output 1900 ml   Net -600 ml       Physical Exam  Vitals and nursing note reviewed.   Constitutional:       General: He is not in acute distress.     Appearance: Normal appearance. He is well-developed. He is not toxic-appearing.   HENT:      Head: Normocephalic and atraumatic.      Nose: No congestion.     Eyes:      Conjunctiva/sclera: Conjunctivae normal.       Cardiovascular:      Rate and Rhythm: Normal rate. Rhythm irregular.      Heart sounds: No murmur heard.  Pulmonary:      Effort: Pulmonary effort is normal. No respiratory distress.      Breath sounds: Normal breath sounds.   Abdominal:      Palpations: Abdomen is soft.      Tenderness: There is no abdominal tenderness. There is no guarding or rebound.     Musculoskeletal:         General: No swelling.      Cervical back: Neck supple.      Right lower leg: No edema.      Left lower leg: No edema.     Skin:     General: Skin is warm and dry.      Capillary Refill: Capillary refill takes less than 2 seconds.     Neurological:      Mental Status: He is alert.     Psychiatric:         Mood and Affect: Mood normal.         Behavior: Behavior normal.                 Lab Results: I have reviewed the following results:   Results from last 7 days   Lab Units 06/16/25  0539 06/10/25  1623 06/10/25  0836   WBC Thousand/uL 9.25   < > 12.59*   HEMOGLOBIN g/dL 12.5   < > 12.3   I STAT HEMOGLOBIN   --    < >  --    HEMATOCRIT % 39.0   < > 38.5   HEMATOCRIT, ISTAT   --    < >  --    PLATELETS Thousands/uL 369   < > 223   SEGS PCT %  --   --  86*   LYMPHO PCT %  --   --  7*   MONO PCT %  --   --  5   EOS PCT %  --   --  1    < > = values in this interval not displayed.     Results from last 7 days   Lab Units 06/16/25  0539 06/11/25  0426 06/10/25  1729   SODIUM mmol/L 137   < > 137   POTASSIUM mmol/L 3.8   < > 4.2   CHLORIDE mmol/L 98   < > 103   CO2 mmol/L 31   < > 25   BUN mg/dL 14   < > 14   CREATININE mg/dL 0.79   < > 1.05   ANION GAP mmol/L 8   < > 9    CALCIUM mg/dL 8.6   < > 8.3*   ALBUMIN g/dL  --   --  4.0   TOTAL BILIRUBIN mg/dL  --   --  1.29*   ALK PHOS U/L  --   --  54   ALT U/L  --   --  11   AST U/L  --   --  14   GLUCOSE RANDOM mg/dL 109   < > 125    < > = values in this interval not displayed.     Results from last 7 days   Lab Units 06/10/25  0836   INR  2.41*         Results from last 7 days   Lab Units 06/11/25  0426   HEMOGLOBIN A1C % 6.0*     Results from last 7 days   Lab Units 06/12/25  0505 06/11/25  0426 06/10/25  0836   LACTIC ACID mmol/L  --   --  1.0   PROCALCITONIN ng/ml 0.95* 0.91* 0.62*       Recent Cultures (last 7 days):   Results from last 7 days   Lab Units 06/10/25  1503 06/10/25  0836   BLOOD CULTURE   --  No Growth After 5 Days.  No Growth After 5 Days.   LEGIONELLA URINARY ANTIGEN  Negative  --        Imaging Results Review: I reviewed radiology reports from this admission including: CT chest, CT abdomen/pelvis, and xray(s).  Other Study Results Review: No additional pertinent studies reviewed.    Last 24 Hours Medication List:     Current Facility-Administered Medications:     acetaminophen (TYLENOL) tablet 650 mg, Q6H PRN    albuterol inhalation solution 2.5 mg, Q4H PRN    amLODIPine (NORVASC) tablet 5 mg, Daily    apixaban (ELIQUIS) tablet 5 mg, BID    famotidine (PEPCID) tablet 20 mg, BID    flecainide (TAMBOCOR) tablet 50 mg, BID    guaiFENesin (MUCINEX) 12 hr tablet 1,200 mg, BID    labetalol (NORMODYNE) injection 10 mg, Q4H PRN    latanoprost (XALATAN) 0.005 % ophthalmic solution 1 drop, HS    lidocaine (LIDODERM) 5 % patch 1 patch, Daily    lisinopril (ZESTRIL) tablet 10 mg, Daily    metoprolol succinate (TOPROL-XL) 24 hr tablet 25 mg, Daily    multivitamin-minerals (CENTRUM) tablet 1 tablet, Daily    ondansetron (ZOFRAN) injection 4 mg, Q6H PRN    polyethylene glycol (MIRALAX) packet 17 g, Daily PRN    pravastatin (PRAVACHOL) tablet 20 mg, Daily    tamsulosin (FLOMAX) capsule 0.4 mg, Daily With Dinner    Administrative  Statements   Today, Patient Was Seen By: Jim Ward      **Please Note: This note may have been constructed using a voice recognition system.**

## 2025-06-16 NOTE — ASSESSMENT & PLAN NOTE
Admitted 6/10 under SLIM for sepsis 2/2 RLL pneumonia w/only 2L NC requirement   S/p IV fluid resuscitation which resulted in flash pulmonary edema see plan for same\r  See also plan for sepsis due to pneumonia  Have been monitoring fluid status closely - patient given one-time dose of 40 mg IV Lasix 6/15    Encourage mobility, I-S, out of bed to chair  Patient stable on room air - will continue to adjust as needed per respiratory protocol

## 2025-06-16 NOTE — ASSESSMENT & PLAN NOTE
6/10 rapid response for escalating O2 requirements after receiving volume resuscitation   See also plan for acute respiratory failure with hypoxia  6/11 TTE LVEF 55% with normal systolic function and normal wall motion.  Right ventricular normal systolic function  Patient is not on diuretics in the outpatient setting  Is s/p IV Lasix 80 mg x1 (6/10), plus additional dose of 40 mg Lasix (6/15)  Repeat chest x-ray (6/13): Improving right lower lobe pneumonia.   Patient currently stable on room air - continue to monitor and adjust as needed.   Continue to monitor fluid status until discharge.

## 2025-06-16 NOTE — PLAN OF CARE
Problem: PAIN - ADULT  Goal: Verbalizes/displays adequate comfort level or baseline comfort level  Description: Interventions:  - Encourage patient to monitor pain and request assistance  - Assess pain using appropriate pain scale  - Administer analgesics as ordered based on type and severity of pain and evaluate response  - Implement non-pharmacological measures as appropriate and evaluate response  - Consider cultural and social influences on pain and pain management  - Notify physician/advanced practitioner if interventions unsuccessful or patient reports new pain  - Educate patient/family on pain management process including their role and importance of  reporting pain   - Provide non-pharmacologic/complimentary pain relief interventions  Outcome: Progressing     Problem: Knowledge Deficit  Goal: Patient/family/caregiver demonstrates understanding of disease process, treatment plan, medications, and discharge instructions  Description: Complete learning assessment and assess knowledge base.  Interventions:  - Provide teaching at level of understanding  - Provide teaching via preferred learning methods  Outcome: Progressing

## 2025-06-17 VITALS
TEMPERATURE: 98.1 F | BODY MASS INDEX: 27.33 KG/M2 | OXYGEN SATURATION: 93 % | HEART RATE: 89 BPM | WEIGHT: 195.22 LBS | DIASTOLIC BLOOD PRESSURE: 69 MMHG | HEIGHT: 71 IN | RESPIRATION RATE: 18 BRPM | SYSTOLIC BLOOD PRESSURE: 126 MMHG

## 2025-06-17 LAB
ANION GAP SERPL CALCULATED.3IONS-SCNC: 8 MMOL/L (ref 4–13)
BASOPHILS # BLD AUTO: 0.06 THOUSANDS/ÂΜL (ref 0–0.1)
BASOPHILS NFR BLD AUTO: 1 % (ref 0–1)
BUN SERPL-MCNC: 18 MG/DL (ref 5–25)
CALCIUM SERPL-MCNC: 8.6 MG/DL (ref 8.4–10.2)
CHLORIDE SERPL-SCNC: 97 MMOL/L (ref 96–108)
CO2 SERPL-SCNC: 30 MMOL/L (ref 21–32)
CREAT SERPL-MCNC: 0.88 MG/DL (ref 0.6–1.3)
EOSINOPHIL # BLD AUTO: 0.25 THOUSAND/ÂΜL (ref 0–0.61)
EOSINOPHIL NFR BLD AUTO: 3 % (ref 0–6)
ERYTHROCYTE [DISTWIDTH] IN BLOOD BY AUTOMATED COUNT: 13.6 % (ref 11.6–15.1)
GFR SERPL CREATININE-BSD FRML MDRD: 83 ML/MIN/1.73SQ M
GLUCOSE SERPL-MCNC: 107 MG/DL (ref 65–140)
HCT VFR BLD AUTO: 39.6 % (ref 36.5–49.3)
HGB BLD-MCNC: 12.5 G/DL (ref 12–17)
IMM GRANULOCYTES # BLD AUTO: 0.2 THOUSAND/UL (ref 0–0.2)
IMM GRANULOCYTES NFR BLD AUTO: 2 % (ref 0–2)
LYMPHOCYTES # BLD AUTO: 1.08 THOUSANDS/ÂΜL (ref 0.6–4.47)
LYMPHOCYTES NFR BLD AUTO: 11 % (ref 14–44)
MCH RBC QN AUTO: 29.8 PG (ref 26.8–34.3)
MCHC RBC AUTO-ENTMCNC: 31.6 G/DL (ref 31.4–37.4)
MCV RBC AUTO: 95 FL (ref 82–98)
MONOCYTES # BLD AUTO: 0.75 THOUSAND/ÂΜL (ref 0.17–1.22)
MONOCYTES NFR BLD AUTO: 7 % (ref 4–12)
NEUTROPHILS # BLD AUTO: 7.74 THOUSANDS/ÂΜL (ref 1.85–7.62)
NEUTS SEG NFR BLD AUTO: 76 % (ref 43–75)
NRBC BLD AUTO-RTO: 0 /100 WBCS
PLATELET # BLD AUTO: 372 THOUSANDS/UL (ref 149–390)
PMV BLD AUTO: 9.1 FL (ref 8.9–12.7)
POTASSIUM SERPL-SCNC: 4 MMOL/L (ref 3.5–5.3)
RBC # BLD AUTO: 4.19 MILLION/UL (ref 3.88–5.62)
SODIUM SERPL-SCNC: 135 MMOL/L (ref 135–147)
WBC # BLD AUTO: 10.08 THOUSAND/UL (ref 4.31–10.16)

## 2025-06-17 PROCEDURE — 85025 COMPLETE CBC W/AUTO DIFF WBC: CPT

## 2025-06-17 PROCEDURE — 94664 DEMO&/EVAL PT USE INHALER: CPT

## 2025-06-17 PROCEDURE — 80048 BASIC METABOLIC PNL TOTAL CA: CPT

## 2025-06-17 PROCEDURE — 99239 HOSP IP/OBS DSCHRG MGMT >30: CPT

## 2025-06-17 PROCEDURE — 94760 N-INVAS EAR/PLS OXIMETRY 1: CPT

## 2025-06-17 RX ORDER — ALBUTEROL SULFATE 90 UG/1
2 INHALANT RESPIRATORY (INHALATION) EVERY 6 HOURS PRN
Qty: 8.5 G | Refills: 0 | Status: SHIPPED | OUTPATIENT
Start: 2025-06-17

## 2025-06-17 RX ORDER — FAMOTIDINE 20 MG/1
20 TABLET, FILM COATED ORAL 2 TIMES DAILY
Qty: 60 TABLET | Refills: 0 | Status: SHIPPED | OUTPATIENT
Start: 2025-06-17

## 2025-06-17 RX ORDER — AMLODIPINE BESYLATE 5 MG/1
5 TABLET ORAL DAILY
Qty: 30 TABLET | Refills: 0 | Status: SHIPPED | OUTPATIENT
Start: 2025-06-18

## 2025-06-17 RX ADMIN — AMLODIPINE BESYLATE 5 MG: 5 TABLET ORAL at 09:19

## 2025-06-17 RX ADMIN — PRAVASTATIN SODIUM 20 MG: 20 TABLET ORAL at 09:19

## 2025-06-17 RX ADMIN — Medication 1 TABLET: at 09:18

## 2025-06-17 RX ADMIN — LISINOPRIL 10 MG: 10 TABLET ORAL at 09:19

## 2025-06-17 RX ADMIN — DICLOFENAC SODIUM 2 G: 10 GEL TOPICAL at 09:25

## 2025-06-17 RX ADMIN — FAMOTIDINE 20 MG: 20 TABLET, FILM COATED ORAL at 09:18

## 2025-06-17 RX ADMIN — CEFTRIAXONE SODIUM 1000 MG: 10 INJECTION, POWDER, FOR SOLUTION INTRAVENOUS at 13:28

## 2025-06-17 RX ADMIN — METOPROLOL SUCCINATE 25 MG: 25 TABLET, EXTENDED RELEASE ORAL at 09:18

## 2025-06-17 RX ADMIN — APIXABAN 5 MG: 5 TABLET, FILM COATED ORAL at 09:19

## 2025-06-17 RX ADMIN — KETOROLAC TROMETHAMINE 15 MG: 30 INJECTION, SOLUTION INTRAMUSCULAR at 06:00

## 2025-06-17 RX ADMIN — FLECAINIDE ACETATE 50 MG: 50 TABLET ORAL at 09:18

## 2025-06-17 RX ADMIN — DICLOFENAC SODIUM 2 G: 10 GEL TOPICAL at 13:28

## 2025-06-17 RX ADMIN — LIDOCAINE 1 PATCH: 700 PATCH TOPICAL at 09:19

## 2025-06-17 RX ADMIN — GUAIFENESIN 1200 MG: 600 TABLET ORAL at 09:18

## 2025-06-17 NOTE — ASSESSMENT & PLAN NOTE
Admitted 6/10 under SLIM for sepsis 2/2 RLL pneumonia w/only 2L NC requirement   S/p IV fluid resuscitation which resulted in flash pulmonary edema see plan for same\r  See also plan for sepsis due to pneumonia  Have been monitoring fluid status closely - patient given one-time dose of 40 mg IV Lasix 6/15    Encourage mobility, IS, out of bed to chair  Patient stable on room air - will continue to adjust as needed per respiratory protocol  Home o2 eval done prior to discharge, no home o2 needed. Discussed with patient prior to discharge regarding lasix prn on discharge, patient would like to hold off and follow up with PCP to discuss further.

## 2025-06-17 NOTE — DISCHARGE INSTR - AVS FIRST PAGE
Dear Ramon Keith,     It was our pleasure to care for you here at [facility]. For follow up as well as any medication refills, we recommend that you follow up with your primary care physician. Here are the most important instructions/ recommendations at discharge:     Notable Medication Adjustments -   Start taking Norvasc 5 mg daily  Start taking Pepcid 20 mg twice daily  Start taking albuterol as needed for shortness of breath or wheezing  Discussed with your PCP regarding Lasix as needed for any shortness of breath, weight gain.  Testing Required after Discharge - ** Please contact your PCP to request testing orders for any of the testing recommended here **  Repeat CBC and BMP in 1 week  Repeat chest x-ray or CT chest in 6 to 8 weeks with PCP  Found to have significant right lower lobe pneumonia and small right pleural effusion. Mediastinal and hilar lymph nodes are likely reactive. Follow-up noncontrast chest CT is recommended in 3 months. Solid 5 mm left upper lobe pulmonary nodule. The nodule will be reassessed on the aforementioned CT.  Found to have 1.8 cm right adrenal nodule. Although its imaging features are indeterminate, it does not have suspicious imaging features (heterogeneity, necrosis, irregular margins), therefore this is likely benign, and can be followed by non-contrast abdomen CT or MRI in 12 months. Please note that for adrenal nodule > 1 cm,  biochemical evaluation is suggested to rule out functioning adenoma, if not already performed. Considerations related to the patient's age and/or comorbidities may be used to alter these recommendations.  Important follow up information -   Follow up with pcp in 1 week  Other Instructions -   Please continue take medications as prescribed.  If you have any new or worsening symptoms, please return to the emergency department for further evaluation.  Please monitor blood pressure closely outpatient.  Please review this entire after  visit summary as additional general instructions including medication list, appointments, activity, diet, any pertinent wound care, and other additional recommendations from your care team that may be provided for you.      Sincerely,     Michell Delgado PA-C

## 2025-06-17 NOTE — ASSESSMENT & PLAN NOTE
Present on arrival to ED   AEB tachycardia, tachypnea, leukocytosis. Now resolved  Is s/p IV fluid resuscitation (6/10)-flash pulmonary edema afterwards see plan for same  Initial work-up to include (6/10):   Chest x-ray: right lower lobe pneumonia  COVID/flu/RSV negative. Procalcitonin 0.62. Lactic acid 1.0  Urinary antigens negative  CT chest abdomen pelvis noted right lower lobe pneumonia and small right pleural effusion, also 5 mm lung nodule left lung and adrenal nodule-will need outpatient follow-up  Follow-up chest x-ray (6/13): noted improvement in pneumonia  Patient weaned off O2 and remains stable on room air. Home oxygen study completed, does not qualify for home oxygen.   Continue IV Rocephin, plan to treat for total of 7 days.   Completed abx prior to discharge  Dispo: level III. Outpatient PT/OT. Should have repeat CT chest in 6-8 weeks with PCP. Should return for new or worsening symptoms.   Repeat cbc and bmp in 1 week with PCP.

## 2025-06-17 NOTE — UTILIZATION REVIEW
"Continued Stay Review    Date: 6/17/25                          Current Patient Class: Inpatient  Current Level of Care: Med Surg     HPI:74 y.o. male initially admitted on 6/10/25    Current Diagnosis: Acute respiratory failure with hypoxia    Assessment/Plan: Hospitalist: Knee pain improved with topical Voltaren gel and IV toradol. Pt examined and reports he feels \" great\", denies complaints. On room air, no sob with ambulation, Spo2 93%. Since pain is significantly better in his knees looking forward to going home, PT/OT evaluated patient and recommended on discharge outpatient PT/OT for patient. DC to home today with f/I with his PCP in one week.           Medications:   Scheduled Medications:  amLODIPine, 5 mg, Oral, Daily  apixaban, 5 mg, Oral, BID  cefTRIAXone, 1,000 mg, Intravenous, Q24H  Diclofenac Sodium, 2 g, Topical, 4x Daily  famotidine, 20 mg, Oral, BID  flecainide, 50 mg, Oral, BID  guaiFENesin, 1,200 mg, Oral, BID  latanoprost, 1 drop, Both Eyes, HS  lidocaine, 1 patch, Topical, Daily  lisinopril, 10 mg, Oral, Daily  metoprolol succinate, 25 mg, Oral, Daily  multivitamin-minerals, 1 tablet, Oral, Daily  pravastatin, 20 mg, Oral, Daily  tamsulosin, 0.4 mg, Oral, Daily With Dinner      Continuous IV Infusions: none      PRN Meds:  acetaminophen, 650 mg, Oral, Q6H PRN  albuterol, 2.5 mg, Nebulization, Q4H PRN  labetalol, 10 mg, Intravenous, Q4H PRN  ondansetron, 4 mg, Intravenous, Q6H PRN  polyethylene glycol, 17 g, Oral, Daily PRN      Discharge Plan: TBD     Vital Signs (last 3 days)       Date/Time Temp Pulse Resp BP MAP (mmHg) SpO2 Calculated FIO2 (%) - Nasal Cannula Nasal Cannula O2 Flow Rate (L/min) O2 Device Patient Position - Orthostatic VS Dane Coma Scale Score Pain    06/17/25 0945 -- -- -- -- -- -- -- -- -- -- 15 --    06/17/25 0731 -- -- -- -- -- -- -- -- None (Room air) -- -- No Pain    06/17/25 07:21:42 98.1 °F (36.7 °C) 89 -- 126/69 88 93 % -- -- -- -- -- --    06/17/25 0600 -- -- -- " -- -- -- -- -- -- -- -- No Pain    06/16/25 2348 -- -- -- -- -- -- -- -- -- -- -- No Pain    06/16/25 2235 -- -- -- -- -- 92 % -- -- None (Room air) -- 15 No Pain    06/16/25 22:17:30 98.4 °F (36.9 °C) 81 18 117/79 92 90 % -- -- -- -- -- --    06/16/25 1726 -- -- -- -- -- -- -- -- -- -- -- Med Not Given for Pain - for MAR use only    06/16/25 14:37:16 98.4 °F (36.9 °C) 81 16 109/62 78 94 % 24 1 L/min Nasal cannula Lying -- --    06/16/25 1244 -- -- -- -- -- 92 % -- -- -- -- 15 --    06/16/25 1212 -- -- -- -- -- -- -- -- -- -- -- Med Not Given for Pain - for MAR use only    06/16/25 1134 -- -- -- -- -- -- -- -- -- -- -- 3    06/16/25 07:17:33 98.7 °F (37.1 °C) 92 16 137/71 93 88 % -- -- None (Room air) Lying -- --    06/16/25 0708 -- -- -- -- -- 91 % -- -- None (Room air) -- -- --    06/15/25 22:56:32 98.8 °F (37.1 °C) 91 17 129/61 84 90 % -- -- -- -- -- --    06/15/25 2100 -- -- -- -- -- -- -- -- -- -- 15 No Pain    06/15/25 14:29:19 98.1 °F (36.7 °C) 82 20 110/65 80 91 % -- -- -- -- -- --    06/15/25 1120 -- -- -- -- -- -- 24 1 L/min Nasal cannula -- 15 No Pain    06/15/25 07:19:30 98.1 °F (36.7 °C) 79 20 134/69 91 93 % -- -- -- -- -- --    06/15/25 0709 -- -- -- -- -- 90 % 24 1 L/min Nasal cannula -- -- --    06/14/25 2252 -- -- -- -- -- 94 % 24 1 L/min Nasal cannula -- -- --    06/14/25 2240 -- -- -- -- -- 84 %  -- -- -- -- -- --    06/14/25 22:19:40 98.3 °F (36.8 °C) 89 24 147/68 94 89 % -- -- None (Room air) -- -- --    06/14/25 2140 -- -- -- -- -- -- -- -- -- -- 15 No Pain    06/14/25 15:17:42 98.2 °F (36.8 °C) 81 -- 154/78 103 91 % -- -- -- -- -- --    06/14/25 15:17:27 98.2 °F (36.8 °C) 79 -- 154/78 103 87 % -- -- -- -- -- --    06/14/25 1101 97.3 °F (36.3 °C) 81 23 137/73 99 96 % -- -- Nasal cannula Sitting -- --    06/14/25 1100 -- 81 45 137/73 99 -- -- -- -- Sitting -- --    06/14/25 0940 -- -- -- -- -- -- -- -- -- -- -- No Pain    06/14/25 0900 -- 96 28 162/72 104 96 % 24 1 L/min Nasal cannula Sitting --  --    06/14/25 0856 -- 91 -- 162/72 -- -- -- -- -- -- -- --    06/14/25 0800 -- -- -- -- -- -- -- -- -- -- 15 --    06/14/25 0706 97.6 °F (36.4 °C) 81 21 134/60 87 94 % 24 1 L/min Nasal cannula Lying -- --    06/14/25 0200 -- 82 20 -- -- 93 % 24 1 L/min Nasal cannula Lying -- --    06/14/25 0100 -- 85 20 -- -- 94 % 24 1 L/min Nasal cannula Lying -- --    06/14/25 0000 -- 91 20 -- -- 95 % 24 1 L/min Nasal cannula Lying -- --          Weight (last 2 days)       Date/Time Weight    06/17/25 0546 88.6 (195.22)    06/16/25 0540 88.7 (195.55)    06/15/25 0540 89.7 (197.75)    06/15/25 0510 89.7 (197.75)            Pertinent Labs/Diagnostic Results:   Radiology:  XR chest portable   Final Interpretation by Paige Jackson MD (06/13 1514)      Improving right lower lobe pneumonia.            Workstation performed: [id number]         XR chest portable ICU   Final Interpretation by Mery Clark MD (06/11 0807)      Improving right lower lobe pneumonia.            Workstation performed: [id number]         CT chest abdomen pelvis w contrast   Final Interpretation by Saran Kiran MD (06/10 1051)      1. Significant right lower lobe pneumonia and small right pleural effusion. Mediastinal and hilar lymph nodes are likely reactive. Follow-up noncontrast chest CT is recommended in 3 months.      2. Solid 5 mm left upper lobe pulmonary nodule. The nodule will be reassessed on the aforementioned CT.      3. 1.8 cm right adrenal nodule. Although its imaging features are indeterminate, it does not have suspicious imaging features (heterogeneity, necrosis, irregular margins), therefore this is likely benign, and can be followed by non-contrast abdomen CT or    MRI in 12 months. Please note that for adrenal nodule > 1 cm,  biochemical evaluation is suggested to rule out functioning adenoma, if not already performed. Considerations related to the patient's age and/or comorbidities may be used to alter these     recommendations.      The study was marked in EPIC for immediate notification.      Resident: ZEE MALAVE I, the attending radiologist, have reviewed the images and agree with the final report above.      Workstation performed: [id number]         XR chest 1 view portable   ED Interpretation by Brody Riggs MD (06/10 1058)   Right side Pneumonia       Final Interpretation by Khris Gonzalez MD (06/10 1615)      Right lower lobe pneumonia.            Workstation performed: [id number]           Cardiology:  ECG 12 lead   Final Result by Lane Menchaca MD (06/12 0853)   Atrial tachycardia with rapid ventricular response   Left axis deviation   Low voltage QRS   Nonspecific ST-t wave changes   When compared with ECG of 10-Nain-2025 17:59,   Vent. rate has increased by  79 bpm      Confirmed by Lane Menchaca ([id number]) on 6/12/2025 8:53:38 AM      Echo complete w/ contrast if indicated   Final Result by Lane Menchaca MD (06/11 0832)        Left Ventricle: Left ventricular cavity size is normal. Wall thickness    is normal. The left ventricular ejection fraction is 55%. Systolic    function is normal. Wall motion is normal.     Right Ventricle: Right ventricular cavity size is normal. Systolic    function is normal.     Left Atrium: The atrium is dilated.         ECG 12 lead   Final Result by Lane Menchaca MD (06/11 0836)   Atrial fibrillation   Controlled ventricular response   Nonspecific ST-t wave changes   when compared to  10-Nain-2025 08:24,   No significant change was found      Confirmed by Lane Menchaca ([id number]) on 6/11/2025 8:36:01 AM      ECG 12 lead   Final Result by Lane Menchaca MD (06/10 1104)   Atrial fibrillation   Rightward axis   Intraventricular conduction delay   When compared with ECG of 02-Jan-2023 14:14,   Vent. rate has increased by  35 bpm   Confirmed by Lane Menchaca ([id number]) on 6/10/2025 11:04:09 AM              Results from last 7 days   Lab Units 06/17/25  3090  06/16/25  0539 06/15/25  0507 06/14/25 0426 06/13/25  0317   WBC Thousand/uL 10.08 9.25 6.50 8.26 10.97*   HEMOGLOBIN g/dL 12.5 12.5 11.9* 11.7* 10.8*   HEMATOCRIT % 39.6 39.0 37.3 36.8 34.9*   PLATELETS Thousands/uL 372 369 318 269 230   TOTAL NEUT ABS Thousands/µL 7.74*  --   --   --   --          Results from last 7 days   Lab Units 06/17/25  0503 06/16/25  0539 06/15/25  0507 06/14/25  0426 06/13/25 0317 06/12/25  0505 06/11/25  0426 06/10/25  1729 06/10/25  1729 06/10/25  1623   SODIUM mmol/L 135 137 140 138 137 136 135   < > 137  --    POTASSIUM mmol/L 4.0 3.8 3.5 3.3* 3.9 4.3 3.9   < > 4.2  --    CHLORIDE mmol/L 97 98 100 101 101 100 101   < > 103  --    CO2 mmol/L 30 31 33* 29 29 28 25   < > 25  --    CO2, I-STAT mmol/L  --   --   --   --   --   --   --   --   --  22   ANION GAP mmol/L 8 8 7 8 7 8 9   < > 9  --    BUN mg/dL 18 14 12 11 13 15 15   < > 14  --    CREATININE mg/dL 0.88 0.79 0.62 0.64 0.73 0.82 1.01   < > 1.05  --    EGFR ml/min/1.73sq m 83 87 96 95 90 85 71   < > 68  --    CALCIUM mg/dL 8.6 8.6 8.8 8.2* 8.6 9.0 8.5   < > 8.3*  --    CALCIUM, IONIZED mmol/L  --   --   --   --   --  1.08* 1.06*  --  1.01*  --    CALCIUM, IONIZED, ISTAT mmol/L  --   --   --   --   --   --   --   --   --  1.16   MAGNESIUM mg/dL  --   --   --   --   --  2.3 1.8*  --  1.9  --    PHOSPHORUS mg/dL  --   --   --   --   --  3.3 2.4  --  3.1  --     < > = values in this interval not displayed.     Results from last 7 days   Lab Units 06/10/25  1729   AST U/L 14   ALT U/L 11   ALK PHOS U/L 54   TOTAL PROTEIN g/dL 6.8   ALBUMIN g/dL 4.0   TOTAL BILIRUBIN mg/dL 1.29*         Results from last 7 days   Lab Units 06/17/25  0503 06/16/25  0539 06/15/25  0507 06/14/25  0426 06/13/25  0317 06/12/25  0505 06/11/25  0426 06/10/25  1729   GLUCOSE RANDOM mg/dL 107 109 104 106 137 131 126 125         Results from last 7 days   Lab Units 06/11/25  0426   HEMOGLOBIN A1C % 6.0*   EAG mg/dl 126           Results from last 7 days   Lab  Units 06/10/25  1623   I STAT BASE EXC mmol/L -5*   I STAT O2 SAT % 98*   ISTAT PH ART  7.302*   I STAT ART PCO2 mm HG 42.3   I STAT ART PO2 mm .0   I STAT ART HCO3 mmol/L 20.9*         Results from last 7 days   Lab Units 06/10/25  1729   HS TNI 0HR ng/L 16                 Results from last 7 days   Lab Units 06/12/25  0505 06/11/25  0426   PROCALCITONIN ng/ml 0.95* 0.91*         Results from last 7 days   Lab Units 06/10/25  1504   BNP pg/mL 409*             Results from last 7 days   Lab Units 06/10/25  1503   STREP PNEUMONIAE ANTIGEN, URINE  Negative   LEGIONELLA URINARY ANTIGEN  Negative     Network Utilization Review Department  ATTENTION: Please call with any questions or concerns to [phone] and carefully listen to the prompts so that you are directed to the right person. All voicemails are confidential.   For Discharge needs, contact Care Management DC Support Team at [phone] opt. 2  Send all requests for admission clinical reviews, approved or denied determinations and any other requests to dedicated fax number below belonging to the campus where the patient is receiving treatment. List of dedicated fax numbers for the Facilities:  FACILITY NAME UR FAX NUMBER   ADMISSION DENIALS (Administrative/Medical Necessity) [phone]   DISCHARGE SUPPORT TEAM (NETWORK) [phone]   PARENT CHILD HEALTH (Maternity/NICU/Pediatrics) [phone]   [facility] [phone]   [facility] [phone]   [facility] [phone]   [facility] [phone]   [facility] [phone]   [facility] [phone]   [facility] [phone]   [facility] [phone]   [facility] [phone]   [facility] [phone]   [facility]  [facility] [phone]   [facility] [place] [phone]

## 2025-06-17 NOTE — INCIDENTAL FINDINGS
The following findings require follow up:  Radiographic finding   Findin.8 cm right adrenal nodule. Although its imaging features are indeterminate, it does not have suspicious imaging features (heterogeneity, necrosis, irregular margins), therefore this is likely benign, and can be followed by non-contrast abdomen CT or MRI in 12 months. Please note that for adrenal nodule > 1 cm,  biochemical evaluation is suggested to rule out functioning adenoma, if not already performed. Considerations related to the patient's age and/or comorbidities may be used to alter these recommendations.   Follow up required: non-contrast abdomen CT or MRI in 12 months   Follow up should be done within 12 month(s)    Please notify the following clinician to assist with the follow up:   Dr. Jamel Holliday MD    Incidental finding results were discussed with the Patient by Michell Delgado PA-C on 25.   They expressed understanding and all questions answered.

## 2025-06-17 NOTE — NURSING NOTE
Patient's IV removed catheter tip intact. Dsd and pressure applied. AVS reviewed with assistance of virtual nurse. No further questions or concerns at this time.

## 2025-06-17 NOTE — DISCHARGE SUMMARY
Discharge Summary - Hospitalist   Name: Ramon Keith 74 y.o. male I MRN: [id number]  Unit/Bed#: -01 I Date of Admission: 6/10/2025   Date of Service: 6/17/2025 I Hospital Day: 7     Assessment & Plan  Sepsis due to pneumonia (HCC)  Present on arrival to ED   AEB tachycardia, tachypnea, leukocytosis. Now resolved  Is s/p IV fluid resuscitation (6/10)-flash pulmonary edema afterwards see plan for same  Initial work-up to include (6/10):   Chest x-ray: right lower lobe pneumonia  COVID/flu/RSV negative. Procalcitonin 0.62. Lactic acid 1.0  Urinary antigens negative  CT chest abdomen pelvis noted right lower lobe pneumonia and small right pleural effusion, also 5 mm lung nodule left lung and adrenal nodule-will need outpatient follow-up  Follow-up chest x-ray (6/13): noted improvement in pneumonia  Patient weaned off O2 and remains stable on room air. Home oxygen study completed, does not qualify for home oxygen.   Continue IV Rocephin, plan to treat for total of 7 days.   Completed abx prior to discharge  Dispo: level III. Outpatient PT/OT. Should have repeat CT chest in 6-8 weeks with PCP. Should return for new or worsening symptoms.   Repeat cbc and bmp in 1 week with PCP.   Acute respiratory failure with hypoxia (HCC)  Admitted 6/10 under [facility] for sepsis 2/2 RLL pneumonia w/only 2L NC requirement   S/p IV fluid resuscitation which resulted in flash pulmonary edema see plan for same\r  See also plan for sepsis due to pneumonia  Have been monitoring fluid status closely - patient given one-time dose of 40 mg IV Lasix 6/15    Encourage mobility, IS, out of bed to chair  Patient stable on room air - will continue to adjust as needed per respiratory protocol  Home o2 eval done prior to discharge, no home o2 needed. Discussed with patient prior to discharge regarding lasix prn on discharge, patient would like to hold off and follow up with PCP to discuss further.   Atrial fibrillation (HCC)  Is s/p successful  cardioversion in past, however currently in AF (rate-controlled)  Continue home AC w/Eliquis  Continue home metoprolol for rate control  Patient had an incident of elevated heart rate 6/12 AM- EKG revealed atrial tachycardia with rapid ventricular response heart rate 173.  Per nursing, patient had no chest pain at that time. He was receiving a.m. care at the time likely related to exertion. No further incidences reported.   No further episodes of heart rate acceleration    Flash pulmonary edema (HCC)  6/10 rapid response for escalating O2 requirements after receiving volume resuscitation   See also plan for acute respiratory failure with hypoxia  6/11 TTE LVEF 55% with normal systolic function and normal wall motion.  Right ventricular normal systolic function  Patient is not on diuretics in the outpatient setting  Is s/p IV Lasix 80 mg x1 (6/10), plus additional dose of 40 mg Lasix (6/15)  Repeat chest x-ray (6/13): Improving right lower lobe pneumonia.   Patient currently stable on room air - continue to monitor and adjust as needed.   Continue to monitor fluid status until discharge.   Discussed prn lasix with patient, he would like to wait and discuss further with PCP. Discussed risks of further volume overload, verbalized understanding and wants to still follow up with PCP.   Hypertensive urgency  Noted to be hypertensive at time of rapid response 6/10  Has underlying HTN and received 3L crystalloid  Is s/p IV Lasix 80 mg x1 (6/10), given additional 40 mg IV x 1 (6/15)  Trends now well controlled   Continue prehospital amlodipine, lisinopril, metoprolol  BP improved and stable at time of discharge.   Continue monitoring BP closely outpatient with PCP.   Hyperlipidemia  Continue statin  Pulmonary nodule  Imaging revealed solid 5 mm left upper lobe pulmonary nodule  Will require follow-up w/repeat imaging as OP  Adrenal nodule (HCC)  Imaging revealed 1.8 cm right adrenal nodule  Will require follow-up as OP  w/either non-contrast abdomen CT or MRI in 12 months  BPH (benign prostatic hyperplasia)  Continue home Flomax  Monitor urinary output  Generalized weakness  Patient reported feeling weaker than baseline, unsteady on his feet  PT/OT reevaluated patient (6/14): patient is appropriate for outpatient PT OT on discharge  To re-eval with worsening arthritic knee pain  Trial toradol 15mg q6 x 1 day & topical agents  Improved with pain.   Fall precautions  Case management continues to follow for discharge planning  Follow up outpatient with pt/ot - referrals given   Hypokalemia  Monitor & replete prn     Medical Problems       Resolved Problems  Date Reviewed: 6/17/2025   None       Discharging Physician / Practitioner: Michell Delgado PA-C  PCP: Jamel Holliday MD  Admission Date:   Admission Orders (From admission, onward)       Ordered        06/10/25 1105  INPATIENT ADMISSION  Once                          Discharge Date: 06/17/25    Next Steps for Physician/AP Assuming Care:  Continue to monitor BP closely outpatient  Discuss with PCP regarding starting to take lasix outpatient as needed  Repeat ct chest in 6-8 weeks  Repeat cbc and bmp in 1 week  Found to have significant right lower lobe pneumonia and small right pleural effusion. Mediastinal and hilar lymph nodes are likely reactive. Follow-up noncontrast chest CT is recommended in 3 months. Solid 5 mm left upper lobe pulmonary nodule. The nodule will be reassessed on the aforementioned CT.  Found to have 1.8 cm right adrenal nodule. Although its imaging features are indeterminate, it does not have suspicious imaging features (heterogeneity, necrosis, irregular margins), therefore this is likely benign, and can be followed by non-contrast abdomen CT or MRI in 12 months. Please note that for adrenal nodule > 1 cm,  biochemical evaluation is suggested to rule out functioning adenoma, if not already performed. Considerations related to the patient's age and/or  comorbidities may be used to alter these recommendations.    Test Results Pending at Discharge (will require follow up):  none    Medication Changes for Discharge & Rationale:   Start taking norvasc 5 mg daily  Start taking pepcid 20 mg twice daily  Start taking albuterol as needed for SOB or wheezing  See after visit summary for reconciled discharge medications provided to patient and/or family.     Consultations During Hospital Stay:  CC, PT/OT    Procedures Performed:   none    Significant Findings / Test Results:   XR chest portable   Final Result by Paige Jackson MD (06/13 1514)      Improving right lower lobe pneumonia.            Workstation performed: [id number]         XR chest portable ICU   Final Result by Mery Clark MD (06/11 0807)      Improving right lower lobe pneumonia.            Workstation performed: [id number]         CT chest abdomen pelvis w contrast   Final Result by Saran Kiran MD (06/10 1051)      1. Significant right lower lobe pneumonia and small right pleural effusion. Mediastinal and hilar lymph nodes are likely reactive. Follow-up noncontrast chest CT is recommended in 3 months.      2. Solid 5 mm left upper lobe pulmonary nodule. The nodule will be reassessed on the aforementioned CT.      3. 1.8 cm right adrenal nodule. Although its imaging features are indeterminate, it does not have suspicious imaging features (heterogeneity, necrosis, irregular margins), therefore this is likely benign, and can be followed by non-contrast abdomen CT or    MRI in 12 months. Please note that for adrenal nodule > 1 cm,  biochemical evaluation is suggested to rule out functioning adenoma, if not already performed. Considerations related to the patient's age and/or comorbidities may be used to alter these    recommendations.      The study was marked in EPIC for immediate notification.      Resident: ZEE MALAVE I, the attending radiologist, have reviewed the images and agree  with the final report above.      Workstation performed: [id number]         XR chest 1 view portable   ED Interpretation by Brody Riggs MD (06/10 9428)   Right side Pneumonia       Final Result by Khris Gonzalez MD (06/10 1615)      Right lower lobe pneumonia.            Workstation performed: [id number]           WBC: 12.59  Procal: 0.62 > 0.91 > 0.95  A1c: 6.0  Troponin negative  BNP: 409  Urine strep and legionella negative  UA: trace protein; microscopic negative  COVID/Flu/RSV negative  BC NG     Incidental Findings:   See above   I reviewed the above mentioned incidental findings with the patient and/or family and they expressed understanding.    Hospital Course:   Ramon Keith is a 74 y.o. male patient who originally presented to the hospital on 6/10/2025 due to nausea, vomiting, chest pain. In the ED, found to meet sepsis criteria with tachycardia, tachypnea, leukocytosis. Source was pneumonia and patient was started on IV abx along with IVF. Also started on solumedrol. Was RRT after being admitted for HTN urgency and acute respiratory failure. Was given 1 dose of IV lasix and upgraded to SD1 under CC. Was placed on MFNC and continued with lasix for flash pulmonary edema. Adjusted BP meds for patient's HTN urgency and BP improved. Patient was downgraded to medsurg on 6/11. Patient's respiratory status continued to improve while patient was admitted and was weaned down to room air. Did receive 2 doses of diuretics while admitted. BP significantly improved with increased dose of norvasc. Continued and completed course of abx while admitted. Home o2 eval done prior to discharge and while doing this, patient was complaining of worsening knee pain. No home o2 was needed. Due to having generalized weakness throughout the stay and also with repeated knee pain, PT/OT evaluated patient and recommended on discharge outpatient PT/OT for patient. Referrals were given. Patient improved and with no further SOB  "or knee pain.  Discussed with him use of diuretics prn on discharge, patient wanted to hold off on this until discussing with his PCP outpatient. Should follow up with PCP in 1 week. Repeat cbc and bmp in 1 week. Follow up with repeat imaging as recommended above. Instructed to monitor BP closely outpatient. Instructed to return for any new or worsening symptoms, verbalized understanding and agreement to above.     Please see above list of diagnoses and related plan for additional information.     Discharge Day Visit / Exam:   Subjective:  Seen and examined today, said that he feels great. No complaints at this time. No chest pain or SOB. Said that his pain is significantly better in the knees. No other complaints. Said that he is looking forward to going home today.   Vitals: Blood Pressure: 126/69 (06/17/25 0721)  Pulse: 89 (06/17/25 0721)  Temperature: 98.1 °F (36.7 °C) (06/17/25 0721)  Temp Source: Oral (06/16/25 1437)  Respirations: 18 (06/16/25 2217)  Height: 5' 11\" (180.3 cm) (06/11/25 0723)  Weight - Scale: 88.6 kg (195 lb 3.5 oz) (06/17/25 0546)  SpO2: 93 % (06/17/25 0721)  Physical Exam  Vitals reviewed.   Constitutional:       General: He is not in acute distress.     Appearance: He is ill-appearing. He is not toxic-appearing.   HENT:      Head: Normocephalic and atraumatic.      Mouth/Throat:      Mouth: Mucous membranes are moist.     Cardiovascular:      Rate and Rhythm: Normal rate. Rhythm irregular.      Heart sounds: No murmur heard.  Pulmonary:      Effort: No respiratory distress.      Breath sounds: No stridor. No wheezing.   Abdominal:      General: There is no distension.      Palpations: Abdomen is soft. There is no mass.      Tenderness: There is no abdominal tenderness.     Musculoskeletal:      Right lower leg: No edema.      Left lower leg: No edema.     Skin:     General: Skin is warm and dry.     Neurological:      Mental Status: He is alert and oriented to person, place, and time. "     Psychiatric:         Mood and Affect: Mood normal.         Behavior: Behavior normal.          Discussion with Family: Updated  (sister) via phone.    Discharge instructions/Information to patient and family:   See after visit summary for information provided to patient and family.      Provisions for Follow-Up Care:  See after visit summary for information related to follow-up care and any pertinent home health orders.      Mobility at time of Discharge:   Basic Mobility Inpatient Raw Score: 14  -HLM Goal: 4: Move to chair/commode  JH-HLM Achieved: 5: Stand (1 or more minutes)  HLM Goal achieved. Continue to encourage appropriate mobility.     Disposition:   Home    Planned Readmission: no    Administrative Statements   Discharge Statement:  I have spent a total time of 48 minutes in caring for this patient on the day of the visit/encounter. >30 minutes of time was spent on: Diagnostic results, Prognosis, Risks and benefits of tx options, Instructions for management, Patient and family education, Importance of tx compliance, Risk factor reductions, Impressions, Counseling / Coordination of care, Documenting in the medical record, and Reviewing / ordering tests, medicine, procedures  .    **Please Note: This note may have been constructed using a voice recognition system**

## 2025-06-17 NOTE — PLAN OF CARE
Problem: Potential for Falls  Goal: Patient will remain free of falls  Description: INTERVENTIONS:  - Educate patient/family on patient safety including physical limitations  - Instruct patient to call for assistance with activity   - Consider consulting OT/PT to assist with strengthening/mobility based on AM PAC & JH-HLM score  - Consult OT/PT to assist with strengthening/mobility   - Keep Call bell within reach  - Keep bed low and locked with side rails adjusted as appropriate  - Keep care items and personal belongings within reach  - Initiate and maintain comfort rounds  - Make Fall Risk Sign visible to staff  - Offer Toileting every 2 Hours, in advance of need  - Initiate/Maintain bed alarm  - Obtain necessary fall risk management equipment  - Apply yellow socks and bracelet for high fall risk patients  - Consider moving patient to room near nurses station  Outcome: Progressing     Problem: PAIN - ADULT  Goal: Verbalizes/displays adequate comfort level or baseline comfort level  Description: Interventions:  - Encourage patient to monitor pain and request assistance  - Assess pain using appropriate pain scale  - Administer analgesics as ordered based on type and severity of pain and evaluate response  - Implement non-pharmacological measures as appropriate and evaluate response  - Consider cultural and social influences on pain and pain management  - Notify physician/advanced practitioner if interventions unsuccessful or patient reports new pain  - Educate patient/family on pain management process including their role and importance of  reporting pain   - Provide non-pharmacologic/complimentary pain relief interventions  Outcome: Progressing     Problem: INFECTION - ADULT  Goal: Absence or prevention of progression during hospitalization  Description: INTERVENTIONS:  - Assess and monitor for signs and symptoms of infection  - Monitor lab/diagnostic results  - Monitor all insertion sites, i.e. indwelling lines,  tubes, and drains  - Monitor endotracheal if appropriate and nasal secretions for changes in amount and color  - Baxter appropriate cooling/warming therapies per order  - Administer medications as ordered  - Instruct and encourage patient and family to use good hand hygiene technique  - Identify and instruct in appropriate isolation precautions for identified infection/condition  Outcome: Progressing

## 2025-06-17 NOTE — INCIDENTAL FINDINGS
The following findings require follow up:  Radiographic finding   Finding:  Significant right lower lobe pneumonia and small right pleural effusion. Mediastinal and hilar lymph nodes are likely reactive. Follow-up noncontrast chest CT is recommended in 3 months.  Solid 5 mm left upper lobe pulmonary nodule. The nodule will be reassessed on the aforementioned CT.   Follow up required: CT chest noncontrast   Follow up should be done within 3 month(s)    Please notify the following clinician to assist with the follow up:   Dr. Jamel Holliday MD    Incidental finding results were discussed with the Patient by Michell Delgado PA-C on 06/17/25.   They expressed understanding and all questions answered.

## 2025-06-17 NOTE — PLAN OF CARE
Problem: Potential for Falls  Goal: Patient will remain free of falls  Description: INTERVENTIONS:  - Educate patient/family on patient safety including physical limitations  - Instruct patient to call for assistance with activity   - Consider consulting OT/PT to assist with strengthening/mobility based on AM PAC & JH-HLM score  - Consult OT/PT to assist with strengthening/mobility   - Keep Call bell within reach  - Keep bed low and locked with side rails adjusted as appropriate  - Keep care items and personal belongings within reach  - Initiate and maintain comfort rounds  - Make Fall Risk Sign visible to staff  - Offer Toileting every 2 Hours, in advance of need  - Initiate/Maintain bed alarm  - Obtain necessary fall risk management equipment  - Apply yellow socks and bracelet for high fall risk patients  - Consider moving patient to room near nurses station  6/17/2025 1331 by Arlette Brantley RN  Outcome: Adequate for Discharge  6/17/2025 1331 by Arlette Brantley RN  Outcome: Adequate for Discharge     Problem: PAIN - ADULT  Goal: Verbalizes/displays adequate comfort level or baseline comfort level  Description: Interventions:  - Encourage patient to monitor pain and request assistance  - Assess pain using appropriate pain scale  - Administer analgesics as ordered based on type and severity of pain and evaluate response  - Implement non-pharmacological measures as appropriate and evaluate response  - Consider cultural and social influences on pain and pain management  - Notify physician/advanced practitioner if interventions unsuccessful or patient reports new pain  - Educate patient/family on pain management process including their role and importance of  reporting pain   - Provide non-pharmacologic/complimentary pain relief interventions  6/17/2025 1331 by Arlette Brantley RN  Outcome: Adequate for Discharge  6/17/2025 1331 by Arlette Brantley RN  Outcome: Adequate for Discharge  6/17/2025 1315 by Arlette Brantley RN  Outcome:  Progressing     Problem: INFECTION - ADULT  Goal: Absence or prevention of progression during hospitalization  Description: INTERVENTIONS:  - Assess and monitor for signs and symptoms of infection  - Monitor lab/diagnostic results  - Monitor all insertion sites, i.e. indwelling lines, tubes, and drains  - Monitor endotracheal if appropriate and nasal secretions for changes in amount and color  - Wray appropriate cooling/warming therapies per order  - Administer medications as ordered  - Instruct and encourage patient and family to use good hand hygiene technique  - Identify and instruct in appropriate isolation precautions for identified infection/condition  6/17/2025 1331 by Arlette Brantley RN  Outcome: Adequate for Discharge  6/17/2025 1331 by Arlette Brantley RN  Outcome: Adequate for Discharge     Problem: SAFETY ADULT  Goal: Patient will remain free of falls  Description: INTERVENTIONS:  - Educate patient/family on patient safety including physical limitations  - Instruct patient to call for assistance with activity   - Consider consulting OT/PT to assist with strengthening/mobility based on AM PAC & JH-HLM score  - Consult OT/PT to assist with strengthening/mobility   - Keep Call bell within reach  - Keep bed low and locked with side rails adjusted as appropriate  - Keep care items and personal belongings within reach  - Initiate and maintain comfort rounds  - Make Fall Risk Sign visible to staff  - Offer Toileting every 2 Hours, in advance of need  - Initiate/Maintain bed alarm  - Obtain necessary fall risk management equipment  - Apply yellow socks and bracelet for high fall risk patients  - Consider moving patient to room near nurses station  6/17/2025 1331 by Arlette Brantley RN  Outcome: Adequate for Discharge  6/17/2025 1331 by Arlette Brantley RN  Outcome: Adequate for Discharge  Goal: Maintain or return to baseline ADL function  Description: INTERVENTIONS:  -  Assess patient's ability to carry out ADLs; assess  patient's baseline for ADL function and identify physical deficits which impact ability to perform ADLs (bathing, care of mouth/teeth, toileting, grooming, dressing, etc.)  - Assess/evaluate cause of self-care deficits   - Assess range of motion  - Assess patient's mobility; develop plan if impaired  - Assess patient's need for assistive devices and provide as appropriate  - Encourage maximum independence but intervene and supervise when necessary  - Involve family in performance of ADLs  - Assess for home care needs following discharge   - Consider OT consult to assist with ADL evaluation and planning for discharge  - Provide patient education as appropriate  - Monitor functional capacity and physical performance, use of AM PAC & JH-HLM   - Monitor gait, balance and fatigue with ambulation    6/17/2025 1331 by Arlette Brantley RN  Outcome: Adequate for Discharge  6/17/2025 1331 by Arlette Brantley RN  Outcome: Adequate for Discharge  Goal: Maintains/Returns to pre admission functional level  Description: INTERVENTIONS:  - Perform AM-PAC 6 Click Basic Mobility/ Daily Activity assessment daily.  - Set and communicate daily mobility goal to care team and patient/family/caregiver.   - Collaborate with rehabilitation services on mobility goals if consulted  - Perform Range of Motion 3 times a day.  - Reposition patient every 2 hours.  - Dangle patient 3 times a day  - Stand patient 3 times a day  - Ambulate patient 3 times a day  - Out of bed to chair 3 times a day   - Out of bed for meals 3 times a day  - Out of bed for toileting  - Record patient progress and toleration of activity level   6/17/2025 1331 by Arlette Brantley RN  Outcome: Adequate for Discharge  6/17/2025 1331 by Arlette Brantley RN  Outcome: Adequate for Discharge     Problem: DISCHARGE PLANNING  Goal: Discharge to home or other facility with appropriate resources  Description: INTERVENTIONS:  - Identify barriers to discharge w/patient and caregiver  - Arrange for  needed discharge resources and transportation as appropriate  - Identify discharge learning needs (meds, wound care, etc.)  - Arrange for interpretive services to assist at discharge as needed  - Refer to Case Management Department for coordinating discharge planning if the patient needs post-hospital services based on physician/advanced practitioner order or complex needs related to functional status, cognitive ability, or social support system  6/17/2025 1331 by Arlette Brantley RN  Outcome: Adequate for Discharge  6/17/2025 1331 by Arlette Brantley RN  Outcome: Adequate for Discharge     Problem: Knowledge Deficit  Goal: Patient/family/caregiver demonstrates understanding of disease process, treatment plan, medications, and discharge instructions  Description: Complete learning assessment and assess knowledge base.  Interventions:  - Provide teaching at level of understanding  - Provide teaching via preferred learning methods  6/17/2025 1331 by Arlette Brantley RN  Outcome: Adequate for Discharge  6/17/2025 1331 by Arlette Brantley RN  Outcome: Adequate for Discharge     Problem: RESPIRATORY - ADULT  Goal: Achieves optimal ventilation and oxygenation  Description: INTERVENTIONS:  - Assess for changes in respiratory status  - Assess for changes in mentation and behavior  - Position to facilitate oxygenation and minimize respiratory effort  - Oxygen administered by appropriate delivery if ordered  - Initiate smoking cessation education as indicated  - Encourage broncho-pulmonary hygiene including cough, deep breathe, Incentive Spirometry  - Assess the need for suctioning and aspirate as needed  - Assess and instruct to report SOB or any respiratory difficulty  - Respiratory Therapy support as indicated  6/17/2025 1331 by Arlette Brantley RN  Outcome: Adequate for Discharge  6/17/2025 1331 by Arlette Brantlye RN  Outcome: Adequate for Discharge     Problem: SKIN/TISSUE INTEGRITY - ADULT  Goal: Skin Integrity remains intact(Skin Breakdown  Prevention)  Description: Assess:  -Perform Micky assessment   -Clean and moisturize skin   -Inspect skin when repositioning, toileting, and assisting with ADLS  -Assess under medical devices  -Assess extremities for adequate circulation and sensation     Bed Management:  -Have minimal linens on bed & keep smooth, unwrinkled  -Change linens as needed when moist or perspiring  -Avoid sitting or lying in one position for more than 2 hours while in bed  -Keep HOB at 30 degrees     Toileting:  -Offer bedside commode  -Assess for incontinence  -Use incontinent care products after each incontinent episode    Activity:  -Mobilize patient 3 times a day  -Encourage activity and walks on unit  -Encourage or provide ROM exercises   -Turn and reposition patient every 2 Hours  -Use appropriate equipment to lift or move patient in bed  -Instruct/ Assist with weight shifting when out of bed in chair  -Consider limitation of chair time 2 hour intervals    Skin Care:  -Avoid use of baby powder, tape, friction and shearing, hot water or constrictive clothing  -Relieve pressure over bony prominences  -Do not massage red bony areas    Next Steps:  -Teach patient strategies to minimize risks   -Consider consults to  interdisciplinary teams   Outcome: Adequate for Discharge

## 2025-06-17 NOTE — ASSESSMENT & PLAN NOTE
Noted to be hypertensive at time of rapid response 6/10  Has underlying HTN and received 3L crystalloid  Is s/p IV Lasix 80 mg x1 (6/10), given additional 40 mg IV x 1 (6/15)  Trends now well controlled   Continue prehospital amlodipine, lisinopril, metoprolol  BP improved and stable at time of discharge.   Continue monitoring BP closely outpatient with PCP.

## 2025-06-17 NOTE — ASSESSMENT & PLAN NOTE
6/10 rapid response for escalating O2 requirements after receiving volume resuscitation   See also plan for acute respiratory failure with hypoxia  6/11 TTE LVEF 55% with normal systolic function and normal wall motion.  Right ventricular normal systolic function  Patient is not on diuretics in the outpatient setting  Is s/p IV Lasix 80 mg x1 (6/10), plus additional dose of 40 mg Lasix (6/15)  Repeat chest x-ray (6/13): Improving right lower lobe pneumonia.   Patient currently stable on room air - continue to monitor and adjust as needed.   Continue to monitor fluid status until discharge.   Discussed prn lasix with patient, he would like to wait and discuss further with PCP. Discussed risks of further volume overload, verbalized understanding and wants to still follow up with PCP.

## 2025-06-17 NOTE — ASSESSMENT & PLAN NOTE
Patient reported feeling weaker than baseline, unsteady on his feet  PT/OT reevaluated patient (6/14): patient is appropriate for outpatient PT OT on discharge  To re-eval with worsening arthritic knee pain  Trial toradol 15mg q6 x 1 day & topical agents  Improved with pain.   Fall precautions  Case management continues to follow for discharge planning  Follow up outpatient with pt/ot - referrals given

## 2025-06-30 ENCOUNTER — HOSPITAL ENCOUNTER (OUTPATIENT)
Dept: CT IMAGING | Facility: HOSPITAL | Age: 75
Discharge: HOME | End: 2025-06-30
Attending: INTERNAL MEDICINE
Payer: COMMERCIAL

## 2025-06-30 DIAGNOSIS — J18.1 LOBAR PNEUMONIA, UNSPECIFIED ORGANISM (HCC): ICD-10-CM

## 2025-06-30 PROCEDURE — 71250 CT THORAX DX C-: CPT

## 2025-07-10 PROBLEM — J18.9 SEPSIS DUE TO PNEUMONIA (HCC): Status: RESOLVED | Noted: 2025-06-10 | Resolved: 2025-07-10

## 2025-07-10 PROBLEM — A41.9 SEPSIS DUE TO PNEUMONIA (HCC): Status: RESOLVED | Noted: 2025-06-10 | Resolved: 2025-07-10

## 2025-08-01 ENCOUNTER — APPOINTMENT (OUTPATIENT)
Dept: LAB | Facility: CLINIC | Age: 75
End: 2025-08-01
Payer: COMMERCIAL

## 2025-08-01 DIAGNOSIS — Z79.899 LONG-TERM USE OF HIGH-RISK MEDICATION: ICD-10-CM

## 2025-08-01 DIAGNOSIS — I10 BENIGN HYPERTENSION: ICD-10-CM

## 2025-08-01 DIAGNOSIS — Z79.899 ENCOUNTER FOR LONG-TERM (CURRENT) USE OF MEDICATIONS: ICD-10-CM

## 2025-08-01 LAB
ALBUMIN SERPL BCG-MCNC: 4.3 G/DL (ref 3.5–5)
ALP SERPL-CCNC: 68 U/L (ref 34–104)
ALT SERPL W P-5'-P-CCNC: 13 U/L (ref 7–52)
ANION GAP SERPL CALCULATED.3IONS-SCNC: 8 MMOL/L (ref 4–13)
AST SERPL W P-5'-P-CCNC: 16 U/L (ref 13–39)
BILIRUB SERPL-MCNC: 0.87 MG/DL (ref 0.2–1)
BUN SERPL-MCNC: 19 MG/DL (ref 5–25)
CALCIUM SERPL-MCNC: 9.3 MG/DL (ref 8.4–10.2)
CHLORIDE SERPL-SCNC: 103 MMOL/L (ref 96–108)
CO2 SERPL-SCNC: 29 MMOL/L (ref 21–32)
CREAT SERPL-MCNC: 1.2 MG/DL (ref 0.6–1.3)
CREAT UR-MCNC: 66.1 MG/DL
ERYTHROCYTE [DISTWIDTH] IN BLOOD BY AUTOMATED COUNT: 13.4 % (ref 11.6–15.1)
GFR SERPL CREATININE-BSD FRML MDRD: 58 ML/MIN/1.73SQ M
GLUCOSE P FAST SERPL-MCNC: 95 MG/DL (ref 65–99)
HCT VFR BLD AUTO: 41.9 % (ref 36.5–49.3)
HGB BLD-MCNC: 13.1 G/DL (ref 12–17)
MCH RBC QN AUTO: 29.7 PG (ref 26.8–34.3)
MCHC RBC AUTO-ENTMCNC: 31.3 G/DL (ref 31.4–37.4)
MCV RBC AUTO: 95 FL (ref 82–98)
MICROALBUMIN UR-MCNC: <7 MG/L
PLATELET # BLD AUTO: 264 THOUSANDS/UL (ref 149–390)
PMV BLD AUTO: 9.7 FL (ref 8.9–12.7)
POTASSIUM SERPL-SCNC: 5.3 MMOL/L (ref 3.5–5.3)
PROT SERPL-MCNC: 6.7 G/DL (ref 6.4–8.4)
RBC # BLD AUTO: 4.41 MILLION/UL (ref 3.88–5.62)
SODIUM SERPL-SCNC: 140 MMOL/L (ref 135–147)
WBC # BLD AUTO: 5.28 THOUSAND/UL (ref 4.31–10.16)

## 2025-08-01 PROCEDURE — 84244 ASSAY OF RENIN: CPT

## 2025-08-01 PROCEDURE — 82088 ASSAY OF ALDOSTERONE: CPT

## 2025-08-01 PROCEDURE — 85027 COMPLETE CBC AUTOMATED: CPT

## 2025-08-01 PROCEDURE — 36415 COLL VENOUS BLD VENIPUNCTURE: CPT

## 2025-08-01 PROCEDURE — 83835 ASSAY OF METANEPHRINES: CPT

## 2025-08-01 PROCEDURE — 82570 ASSAY OF URINE CREATININE: CPT

## 2025-08-01 PROCEDURE — 82043 UR ALBUMIN QUANTITATIVE: CPT

## 2025-08-01 PROCEDURE — 80053 COMPREHEN METABOLIC PANEL: CPT

## 2025-08-05 LAB
ALDOST SERPL-MCNC: 4.3 NG/DL (ref 0–30)
ALDOST/RENIN PLAS-RTO: 2.9 {RATIO} (ref 0–30)
METANEPH FREE SERPL-MCNC: 43.8 PG/ML (ref 0–88)
NORMETANEPHRINE SERPL-MCNC: 137.5 PG/ML (ref 0–285.2)
RENIN PLAS-CCNC: 1.5 NG/ML/HR (ref 0.17–5.38)

## (undated) DEVICE — SPLINT 5 X 30 IN FAST SET PLASTER

## (undated) DEVICE — NEEDLE 25G X 1 1/2

## (undated) DEVICE — Device

## (undated) DEVICE — OCCLUSIVE GAUZE STRIP,3% BISMUTH TRIBROMOPHENATE IN PETROLATUM BLEND: Brand: XEROFORM

## (undated) DEVICE — PREMIUM DRY TRAY LF: Brand: MEDLINE INDUSTRIES, INC.

## (undated) DEVICE — STERILE BETHLEHEM PLASTIC HAND: Brand: CARDINAL HEALTH

## (undated) DEVICE — DISPOSABLE EQUIPMENT COVER: Brand: SMALL TOWEL DRAPE

## (undated) DEVICE — CUFF TOURNIQUET 18 X 4 IN QUICK CONNECT DISP 1 BLADDER

## (undated) DEVICE — GAUZE SPONGES,16 PLY: Brand: CURITY

## (undated) DEVICE — PADDING CAST 4 IN  COTTON STRL

## (undated) DEVICE — GLOVE INDICATOR PI UNDERGLOVE SZ 8 BLUE

## (undated) DEVICE — GLOVE SRG BIOGEL 7.5

## (undated) DEVICE — GUIDEWIRE 1.1 X 150MM TROCAR TIP

## (undated) DEVICE — BIT DRILL 2 X 145MM QC CANN

## (undated) DEVICE — DRAPE C-ARM X-RAY

## (undated) DEVICE — CURITY STRETCH BANDAGE: Brand: CURITY

## (undated) DEVICE — SPONGE PVP SCRUB WING STERILE

## (undated) DEVICE — ACE WRAP 4 IN STERILE

## (undated) DEVICE — SUT PROLENE 4-0 PS-2 18 IN 8682G